# Patient Record
Sex: MALE | Race: WHITE | NOT HISPANIC OR LATINO | Employment: STUDENT | URBAN - METROPOLITAN AREA
[De-identification: names, ages, dates, MRNs, and addresses within clinical notes are randomized per-mention and may not be internally consistent; named-entity substitution may affect disease eponyms.]

---
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
## 2024-01-04 ENCOUNTER — DOCUMENTATION (OUTPATIENT)
Dept: TRANSPLANT | Facility: HOSPITAL | Age: 19
End: 2024-01-04
Payer: COMMERCIAL

## 2024-01-04 NOTE — PROGRESS NOTES
Made a phone call to patient with Dr. Dutton to review the listing consent form over the phone.  The patient stated that he was good to review the form with us without his support.      LISTING EDUCATION    Patient educated regarding the following prior to placement on the transplant waiting list:   The patient?s medical condition, prognosis, and treatment plan.   The expectations and patient responsibilities while on the waiting list, including:   Keeping the transplant center informed of any changes in contact information or insurance coverage   Notifying the transplant center of any changes in medical status   Required testing and/or re-evaluation appointments while awaiting transplant   An overview of the surgical procedure, including potential risks and alternatives.   Information regarding what to expect during the inpatient admission and recovery period.   A discussion regarding organ offers and types of potential donors, including potential risks that may be associated with specific types of donors that could affect the success of the transplant or the health of the patient.  The right to refuse transplantation.     Patient was given the opportunity to have questions answered. Patient was provided a copy of the informed consent for transplant listing.    Education provided by:  Transplant Coordinator: Angeli Gomez RN   Transplant Physician: Dr. Vish Dutton     Signed listing informed consent received? YES/NO  Patient agrees to be listed for the following:  KDPI > 85% []  Donors After Circulatory Death (DCD) [X]  Donors with a Positive Core Antibody for Hepatitis B [X]  Donors with Hepatitis C Virus to recipients with hepatitis C []  Donors with Hepatitis C Virus to Negative hepatitis C recipients []    Patient will be discussed at an upcoming selection committee to determine eligibility to be placed on the [facility] waiting list.

## 2024-01-05 ENCOUNTER — DOCUMENTATION (OUTPATIENT)
Dept: TRANSPLANT | Facility: HOSPITAL | Age: 19
End: 2024-01-05
Payer: COMMERCIAL

## 2024-01-05 NOTE — PROGRESS NOTES
Rec'd Daily fax for kidney txp listing & emailed it to finance auth# [id number] valid 11/17/23-11/16/24. Precert

## 2024-01-05 NOTE — PROGRESS NOTES
Patient added to [facility] waitlist status 1.  Add sent to HLA.  Tasked listing letter to [facility]UAN Lechuga

## 2024-01-05 NOTE — Clinical Note
Rec'd Daily fax for kidney txp listing & emailed it to finance auth# [id number] valid 11/17/23-11/16/24. Precert

## 2024-01-19 ENCOUNTER — TELEPHONE (OUTPATIENT)
Dept: PRIMARY CARE | Facility: CLINIC | Age: 19
End: 2024-01-19
Payer: COMMERCIAL

## 2024-01-19 PROBLEM — G40.89 OTHER SEIZURES (MULTI): Status: ACTIVE | Noted: 2023-12-18

## 2024-01-19 PROBLEM — R07.9 CHEST PAIN, UNSPECIFIED: Status: ACTIVE | Noted: 2023-12-18

## 2024-01-19 PROBLEM — R06.02 SHORTNESS OF BREATH: Status: ACTIVE | Noted: 2023-12-18

## 2024-01-19 PROBLEM — E87.6 HYPOKALEMIA: Status: ACTIVE | Noted: 2023-12-13

## 2024-01-19 NOTE — TELEPHONE ENCOUNTER
Unfortunately cannot take paxlovid as its not indicated for ESRD.   Supportive care, monitor O2 if <92 seek addtl care     Send separate tasks on parents pls

## 2024-01-19 NOTE — TELEPHONE ENCOUNTER
Asymp Covid+ 1/17, tested for close contact.  Mild fever 2 days, last night coughing + threw up (not a new Sx).    No other Sx.   Advise?    Parents Ginger & Aryan also covid pos, but both coughing, sinus drainage.  Asked if they need treatment?

## 2024-01-29 ENCOUNTER — LAB REQUISITION (OUTPATIENT)
Dept: LAB | Facility: CLINIC | Age: 19
End: 2024-01-29
Payer: COMMERCIAL

## 2024-01-29 DIAGNOSIS — N18.6 END STAGE RENAL DISEASE (MULTI): ICD-10-CM

## 2024-01-31 ENCOUNTER — LAB REQUISITION (OUTPATIENT)
Dept: LAB | Facility: CLINIC | Age: 19
End: 2024-01-31
Payer: COMMERCIAL

## 2024-01-31 DIAGNOSIS — N18.6 END STAGE RENAL DISEASE (MULTI): ICD-10-CM

## 2024-01-31 PROCEDURE — 86832 HLA CLASS I HIGH DEFIN QUAL: CPT | Mod: OUT | Performed by: SURGERY

## 2024-02-01 LAB
HLA RESULTS: NORMAL
HLA-A+B+C AB NFR SER: NORMAL %
HLA-DP+DQ+DR AB NFR SER: NORMAL %

## 2024-02-28 ENCOUNTER — LAB REQUISITION (OUTPATIENT)
Dept: LAB | Facility: CLINIC | Age: 19
End: 2024-02-28
Payer: COMMERCIAL

## 2024-02-28 DIAGNOSIS — N18.6 END STAGE RENAL DISEASE (MULTI): ICD-10-CM

## 2024-02-29 DIAGNOSIS — Z01.818 ENCOUNTER FOR PRE-TRANSPLANT EVALUATION FOR LIVER TRANSPLANT: Primary | ICD-10-CM

## 2024-03-04 ENCOUNTER — LAB (OUTPATIENT)
Dept: LAB | Facility: LAB | Age: 19
End: 2024-03-04
Payer: COMMERCIAL

## 2024-03-04 DIAGNOSIS — Z01.818 ENCOUNTER FOR PRE-TRANSPLANT EVALUATION FOR LIVER TRANSPLANT: ICD-10-CM

## 2024-03-04 PROCEDURE — 36415 COLL VENOUS BLD VENIPUNCTURE: CPT

## 2024-03-04 PROCEDURE — 86825 HLA X-MATH NON-CYTOTOXIC: CPT | Mod: OUT | Performed by: SURGERY

## 2024-03-04 PROCEDURE — 86832 HLA CLASS I HIGH DEFIN QUAL: CPT | Mod: OUT | Performed by: SURGERY

## 2024-03-05 ENCOUNTER — LAB REQUISITION (OUTPATIENT)
Dept: LAB | Facility: CLINIC | Age: 19
End: 2024-03-05
Payer: COMMERCIAL

## 2024-03-05 DIAGNOSIS — N18.6 END STAGE RENAL DISEASE (MULTI): ICD-10-CM

## 2024-03-05 LAB
FLOW AUTOCROSSMATCH: NORMAL
HLA CLS I TYP PNL BLD/T DONR HIGH RES: NORMAL
HLA RESULTS: NORMAL
HLA-A+B+C AB NFR SER: NORMAL %
HLA-A+B+C AB NFR SER: NORMAL %
HLA-DP+DQ+DR AB NFR SER: NORMAL %
HLA-DP+DQ+DR AB NFR SER: NORMAL %
HLA-DP2 QL: NORMAL
HLA-DQB1 HIGH RES: NORMAL
HLA-DRB1 HIGH RES: NORMAL

## 2024-03-06 LAB
FLOW ALLOCROSSMATCH: NORMAL
HLA RESULTS: NORMAL

## 2024-03-13 ENCOUNTER — TELEPHONE (OUTPATIENT)
Dept: GASTROENTEROLOGY | Facility: CLINIC | Age: 19
End: 2024-03-13
Payer: COMMERCIAL

## 2024-03-13 NOTE — TELEPHONE ENCOUNTER
----- Message from Rachel Newton MA sent at 3/13/2024  9:18 AM EDT -----  Regarding: Transplant  They have found a kidney match.  Says nephrology doesn't have experience with Crohns.  Wants your point of view on timing of doing the transplant, as far as infusions go.  Wants to do the very best thing for patient so as not to reject kidney.    [phone]      I had a discussion with the patient and congratulated him on the match.  Also discussed with his mother at his request that my viewpoint would be the optimal time to perform the transplant is when his Crohn's is in remission which she currently is.  I would recommend he do an Entyvio infusion as he is doing and plan the transplant between the 4 and 8-week jaron.  After transplant I would hold Entyvio to better define what the transplant physicians are going to use for immunosuppression to prevent rejection.  He may also receive steroids that would also be benefiting his Crohn's.  If they choose Imuran this may be a medication to help both Crohn's and his rejection.  I will defer to them the choice of the medications to prevent rejection.  Finally, I would see him in the office approximately 4 to 6 weeks after surgery to better define the medicines they are using after transplant as well as the timing to consider restarting Entyvio.    All her questions are answered.

## 2024-03-22 ENCOUNTER — TELEPHONE (OUTPATIENT)
Dept: TRANSPLANT | Facility: HOSPITAL | Age: 19
End: 2024-03-22
Payer: COMMERCIAL

## 2024-03-22 NOTE — TELEPHONE ENCOUNTER
Spoke with Ginger this morning regarding getting a surgery date on the books. Looking to schedule end of May beginning June. Kelvin will be between [facility] and out of school. Note sent to neph and surg to confirm plan.

## 2024-04-02 ENCOUNTER — TELEPHONE (OUTPATIENT)
Dept: TRANSPLANT | Facility: HOSPITAL | Age: 19
End: 2024-04-02
Payer: COMMERCIAL

## 2024-04-02 DIAGNOSIS — N18.6 ESRD (END STAGE RENAL DISEASE) (MULTI): Primary | ICD-10-CM

## 2024-04-02 NOTE — TELEPHONE ENCOUNTER
Spoke with Ginger today as they would like to move forward with surgery at end of April. They are requesting transplant date prior to Entyvio treatment. Dr. Dill in agreement with this plan and surgery date of 4/25/2024 offered and confirmed. Explained that we will bring Kelvin in for a pre-op visit approximately 1-2 weeks prior to surgery. This will entail preadmission testing, labs and a visit with the surgeon. Encouraged Ginger to call with any questions.

## 2024-04-04 ENCOUNTER — DOCUMENTATION (OUTPATIENT)
Dept: TRANSPLANT | Facility: HOSPITAL | Age: 19
End: 2024-04-04
Payer: COMMERCIAL

## 2024-04-05 DIAGNOSIS — Z01.818 PRE-TRANSPLANT EVALUATION FOR KIDNEY TRANSPLANT: Primary | ICD-10-CM

## 2024-04-05 LAB
FLOW AUTOCROSSMATCH: NORMAL
HLA RESULTS: NORMAL

## 2024-04-09 ENCOUNTER — PREP FOR PROCEDURE (OUTPATIENT)
Dept: TRANSPLANT | Facility: HOSPITAL | Age: 19
End: 2024-04-09
Payer: COMMERCIAL

## 2024-04-09 DIAGNOSIS — N18.6 ESRD (END STAGE RENAL DISEASE) (MULTI): Primary | ICD-10-CM

## 2024-04-11 ENCOUNTER — TELEPHONE (OUTPATIENT)
Dept: TRANSPLANT | Facility: HOSPITAL | Age: 19
End: 2024-04-11
Payer: COMMERCIAL

## 2024-04-11 NOTE — TELEPHONE ENCOUNTER
Called and spoke to patient to remind them to update monthly HLA sample at either an approved  lab or dialysis unit, if applicable.  Patient verbalized an understanding and will complete lab work.  Coordinator notified.

## 2024-04-16 ENCOUNTER — DOCUMENTATION (OUTPATIENT)
Dept: TRANSPLANT | Facility: HOSPITAL | Age: 19
End: 2024-04-16

## 2024-04-16 ENCOUNTER — HOSPITAL ENCOUNTER (OUTPATIENT)
Dept: RADIOLOGY | Facility: HOSPITAL | Age: 19
Discharge: HOME | End: 2024-04-16
Payer: COMMERCIAL

## 2024-04-16 ENCOUNTER — PRE-ADMISSION TESTING (OUTPATIENT)
Dept: PREADMISSION TESTING | Facility: HOSPITAL | Age: 19
End: 2024-04-16
Payer: COMMERCIAL

## 2024-04-16 ENCOUNTER — HOSPITAL ENCOUNTER (OUTPATIENT)
Dept: CARDIOLOGY | Facility: HOSPITAL | Age: 19
Discharge: HOME | End: 2024-04-16
Payer: COMMERCIAL

## 2024-04-16 ENCOUNTER — APPOINTMENT (OUTPATIENT)
Dept: LAB | Facility: LAB | Age: 19
End: 2024-04-16
Payer: COMMERCIAL

## 2024-04-16 ENCOUNTER — OFFICE VISIT (OUTPATIENT)
Dept: TRANSPLANT | Facility: HOSPITAL | Age: 19
End: 2024-04-16
Payer: COMMERCIAL

## 2024-04-16 VITALS
WEIGHT: 96.34 LBS | TEMPERATURE: 97.9 F | HEIGHT: 66 IN | SYSTOLIC BLOOD PRESSURE: 117 MMHG | OXYGEN SATURATION: 98 % | DIASTOLIC BLOOD PRESSURE: 81 MMHG | HEART RATE: 110 BPM | BODY MASS INDEX: 15.48 KG/M2

## 2024-04-16 VITALS
SYSTOLIC BLOOD PRESSURE: 143 MMHG | HEART RATE: 116 BPM | OXYGEN SATURATION: 96 % | DIASTOLIC BLOOD PRESSURE: 112 MMHG | WEIGHT: 96.7 LBS | TEMPERATURE: 95.5 F | BODY MASS INDEX: 16.09 KG/M2

## 2024-04-16 DIAGNOSIS — Z01.818 PRE-TRANSPLANT EVALUATION FOR KIDNEY TRANSPLANT: Primary | ICD-10-CM

## 2024-04-16 DIAGNOSIS — Z01.818 PRE-TRANSPLANT EVALUATION FOR KIDNEY TRANSPLANT: ICD-10-CM

## 2024-04-16 DIAGNOSIS — N18.6 ESRD (END STAGE RENAL DISEASE) (MULTI): ICD-10-CM

## 2024-04-16 LAB
ABO GROUP (TYPE) IN BLOOD: NORMAL
ALBUMIN SERPL BCP-MCNC: 5.1 G/DL (ref 3.4–5)
ALP SERPL-CCNC: 224 U/L (ref 33–120)
ALT SERPL W P-5'-P-CCNC: 17 U/L (ref 10–52)
ANION GAP SERPL CALC-SCNC: 20 MMOL/L (ref 10–20)
ANTIBODY SCREEN: NORMAL
APPEARANCE UR: CLEAR
APTT PPP: 38 SECONDS (ref 27–38)
AST SERPL W P-5'-P-CCNC: 17 U/L (ref 9–39)
BILIRUB SERPL-MCNC: 0.3 MG/DL (ref 0–1.2)
BILIRUB UR STRIP.AUTO-MCNC: NEGATIVE MG/DL
BUN SERPL-MCNC: 39 MG/DL (ref 6–23)
CALCIUM SERPL-MCNC: 9.3 MG/DL (ref 8.6–10.6)
CHLORIDE SERPL-SCNC: 99 MMOL/L (ref 98–107)
CMV IGG AVIDITY SERPL IA-RTO: NONREACTIVE %
CO2 SERPL-SCNC: 25 MMOL/L (ref 21–32)
COLOR UR: COLORLESS
CREAT SERPL-MCNC: 6.68 MG/DL (ref 0.5–1.3)
EBV EA IGG SER QL: NEGATIVE
EBV NA AB SER QL: NEGATIVE
EBV VCA IGG SER IA-ACNC: NEGATIVE
EBV VCA IGM SER IA-ACNC: NEGATIVE
EGFRCR SERPLBLD CKD-EPI 2021: 11 ML/MIN/1.73M*2
ERYTHROCYTE [DISTWIDTH] IN BLOOD BY AUTOMATED COUNT: 13.3 % (ref 11.5–14.5)
EST. AVERAGE GLUCOSE BLD GHB EST-MCNC: 74 MG/DL
GLUCOSE SERPL-MCNC: 68 MG/DL (ref 74–99)
GLUCOSE UR STRIP.AUTO-MCNC: ABNORMAL MG/DL
HBA1C MFR BLD: 4.2 %
HBV CORE AB SER QL: NONREACTIVE
HBV SURFACE AB SER-ACNC: <3.1 MIU/ML
HBV SURFACE AG SERPL QL IA: NONREACTIVE
HCT VFR BLD AUTO: 41.5 % (ref 41–52)
HCV AB SER QL: NONREACTIVE
HGB BLD-MCNC: 13.2 G/DL (ref 13.5–17.5)
HIV 1+2 AB+HIV1 P24 AG SERPL QL IA: NONREACTIVE
INR PPP: 1.1 (ref 0.9–1.1)
KETONES UR STRIP.AUTO-MCNC: NEGATIVE MG/DL
LEUKOCYTE ESTERASE UR QL STRIP.AUTO: NEGATIVE
MCH RBC QN AUTO: 29.3 PG (ref 26–34)
MCHC RBC AUTO-ENTMCNC: 31.8 G/DL (ref 32–36)
MCV RBC AUTO: 92 FL (ref 80–100)
NITRITE UR QL STRIP.AUTO: NEGATIVE
NRBC BLD-RTO: 0 /100 WBCS (ref 0–0)
PH UR STRIP.AUTO: 6.5 [PH]
PLATELET # BLD AUTO: 311 X10*3/UL (ref 150–450)
POTASSIUM SERPL-SCNC: 3.5 MMOL/L (ref 3.5–5.3)
PROT SERPL-MCNC: 7.8 G/DL (ref 6.4–8.2)
PROT UR STRIP.AUTO-MCNC: ABNORMAL MG/DL
PROTHROMBIN TIME: 12.2 SECONDS (ref 9.8–12.8)
RBC # BLD AUTO: 4.5 X10*6/UL (ref 4.5–5.9)
RBC # UR STRIP.AUTO: ABNORMAL /UL
RBC #/AREA URNS AUTO: NORMAL /HPF
RH FACTOR (ANTIGEN D): NORMAL
SODIUM SERPL-SCNC: 140 MMOL/L (ref 136–145)
SP GR UR STRIP.AUTO: 1.01
SQUAMOUS #/AREA URNS AUTO: NORMAL /HPF
UROBILINOGEN UR STRIP.AUTO-MCNC: NORMAL MG/DL
WBC # BLD AUTO: 9.1 X10*3/UL (ref 4.4–11.3)
WBC #/AREA URNS AUTO: NORMAL /HPF

## 2024-04-16 PROCEDURE — 99215 OFFICE O/P EST HI 40 MIN: CPT | Performed by: TRANSPLANT SURGERY

## 2024-04-16 PROCEDURE — 86825 HLA X-MATH NON-CYTOTOXIC: CPT | Mod: OUT | Performed by: SURGERY

## 2024-04-16 PROCEDURE — 86832 HLA CLASS I HIGH DEFIN QUAL: CPT

## 2024-04-16 PROCEDURE — 36415 COLL VENOUS BLD VENIPUNCTURE: CPT

## 2024-04-16 PROCEDURE — 86901 BLOOD TYPING SEROLOGIC RH(D): CPT

## 2024-04-16 PROCEDURE — 85610 PROTHROMBIN TIME: CPT

## 2024-04-16 PROCEDURE — 71046 X-RAY EXAM CHEST 2 VIEWS: CPT

## 2024-04-16 PROCEDURE — 81001 URINALYSIS AUTO W/SCOPE: CPT

## 2024-04-16 PROCEDURE — 86704 HEP B CORE ANTIBODY TOTAL: CPT

## 2024-04-16 PROCEDURE — 93010 ELECTROCARDIOGRAM REPORT: CPT | Performed by: INTERNAL MEDICINE

## 2024-04-16 PROCEDURE — 87389 HIV-1 AG W/HIV-1&-2 AB AG IA: CPT

## 2024-04-16 PROCEDURE — 86645 CMV ANTIBODY IGM: CPT

## 2024-04-16 PROCEDURE — 86644 CMV ANTIBODY: CPT

## 2024-04-16 PROCEDURE — 3008F BODY MASS INDEX DOCD: CPT | Performed by: TRANSPLANT SURGERY

## 2024-04-16 PROCEDURE — 71046 X-RAY EXAM CHEST 2 VIEWS: CPT | Performed by: RADIOLOGY

## 2024-04-16 PROCEDURE — 86706 HEP B SURFACE ANTIBODY: CPT

## 2024-04-16 PROCEDURE — 86803 HEPATITIS C AB TEST: CPT

## 2024-04-16 PROCEDURE — 85027 COMPLETE CBC AUTOMATED: CPT

## 2024-04-16 PROCEDURE — 83036 HEMOGLOBIN GLYCOSYLATED A1C: CPT

## 2024-04-16 PROCEDURE — 1036F TOBACCO NON-USER: CPT | Performed by: TRANSPLANT SURGERY

## 2024-04-16 PROCEDURE — 86663 EPSTEIN-BARR ANTIBODY: CPT

## 2024-04-16 PROCEDURE — 80053 COMPREHEN METABOLIC PANEL: CPT

## 2024-04-16 PROCEDURE — 87340 HEPATITIS B SURFACE AG IA: CPT

## 2024-04-16 PROCEDURE — 99204 OFFICE O/P NEW MOD 45 MIN: CPT | Performed by: NURSE PRACTITIONER

## 2024-04-16 PROCEDURE — 93005 ELECTROCARDIOGRAM TRACING: CPT

## 2024-04-16 RX ORDER — CALCITRIOL 0.25 UG/1
0.25 CAPSULE ORAL 3 TIMES WEEKLY
COMMUNITY
Start: 2023-11-30 | End: 2024-04-29 | Stop reason: HOSPADM

## 2024-04-16 RX ORDER — PSYLLIUM HUSK (WITH SUGAR) 3.4 G/12 G
POWDER (GRAM) ORAL
COMMUNITY
Start: 2023-12-14 | End: 2024-04-29 | Stop reason: HOSPADM

## 2024-04-16 ASSESSMENT — ENCOUNTER SYMPTOMS
WEAKNESS: 0
MUSCULOSKELETAL NEGATIVE: 1
NECK NEGATIVE: 1
CARDIOVASCULAR NEGATIVE: 1
ARTHRALGIAS: 0
EYES NEGATIVE: 1
AGITATION: 0
DIARRHEA: 0
EYES NEGATIVE: 1
RHINORRHEA: 1
HALLUCINATIONS: 0
DYSURIA: 0
CONSTIPATION: 0
FREQUENCY: 0
ABDOMINAL PAIN: 0
ABDOMINAL DISTENTION: 0
LIGHT-HEADEDNESS: 0
FEVER: 0
NEUROLOGICAL NEGATIVE: 1
ADENOPATHY: 0
COLOR CHANGE: 0
ENDOCRINE NEGATIVE: 1
RESPIRATORY NEGATIVE: 1
CHILLS: 0
SHORTNESS OF BREATH: 0
GASTROINTESTINAL NEGATIVE: 1
CONSTITUTIONAL NEGATIVE: 1
DIZZINESS: 0
HEMATURIA: 0
COUGH: 0
CONFUSION: 0

## 2024-04-16 ASSESSMENT — DUKE ACTIVITY SCORE INDEX (DASI)
CAN YOU CLIMB A FLIGHT OF STAIRS OR WALK UP A HILL: YES
CAN YOU RUN A SHORT DISTANCE: YES
CAN YOU TAKE CARE OF YOURSELF (EAT, DRESS, BATHE, OR USE TOILET): YES
CAN YOU DO HEAVY WORK AROUND THE HOUSE LIKE SCRUBBING FLOORS OR LIFTING AND MOVING HEAVY FURNITURE: NO
TOTAL_SCORE: 44.95
CAN YOU PARTICIPATE IN STRENOUS SPORTS LIKE SWIMMING, SINGLES TENNIS, FOOTBALL, BASKETBALL, OR SKIING: YES
CAN YOU WALK INDOORS, SUCH AS AROUND YOUR HOUSE: YES
DASI METS SCORE: 8.3
CAN YOU DO LIGHT WORK AROUND THE HOUSE LIKE DUSTING OR WASHING DISHES: YES
CAN YOU DO YARD WORK LIKE RAKING LEAVES, WEEDING OR PUSHING A MOWER: YES
CAN YOU DO MODERATE WORK AROUND THE HOUSE LIKE VACUUMING, SWEEPING FLOORS OR CARRYING GROCERIES: YES
CAN YOU HAVE SEXUAL RELATIONS: NO
CAN YOU WALK A BLOCK OR TWO ON LEVEL GROUND: YES
CAN YOU PARTICIPATE IN MODERATE RECREATIONAL ACTIVITIES LIKE GOLF, BOWLING, DANCING, DOUBLES TENNIS OR THROWING A BASEBALL OR FOOTBALL: YES

## 2024-04-16 ASSESSMENT — CHADS2 SCORE
AGE GREATER THAN OR EQUAL TO 75: NO
HYPERTENSION: NO
PRIOR STROKE OR TIA OR THROMBOEMBOLISM: NO
CHF: NO
DIABETES: NO
CHADS2 SCORE: 0

## 2024-04-16 ASSESSMENT — LIFESTYLE VARIABLES: SMOKING_STATUS: NONSMOKER

## 2024-04-16 NOTE — CPM/PAT H&P
"CPM/PAT Evaluation       Name: Trever Ellison (Trever Ellison)  /Age: 2005/19 y.o.     Visit Type:   In-Person       Chief Complaint: ESRD    HPI  The patient is a 19 year old  male with ESRD. This is thought to be secondary to Vedolizumab use for his Crohn's disease. He currently does PD. He presents today for perioperative evaluation in anticipation of transplant kidney on  with Dr. Williamson.    Past Medical History:   Diagnosis Date    Acute Crohn's disease (Multi)     colitis    Aluminum bone disease     Anemia     Angina pectoris (CMS-HCC)     Cachexia (Multi)     End stage chronic kidney disease (Multi)     Dialysis    Hemodialysis patient (CMS-HCC)     Hydronephrosis     1 yo    Nephritis     Tachycardia     Vitamin D deficiency        Past Surgical History:   Procedure Laterality Date    COLON SURGERY      crohns    COLONOSCOPY      CT GUIDED CHEST TUBE PLACEMENT  2023    CT GUIDED CHEST TUBE PLACEMENT 2023 [facility]    CT GUIDED PERCUTANEOUS BIOPSY KIDNEY      CT GUIDED PERCUTANEOUS BIOPSY LUNG  2023    CT GUIDED PERCUTANEOUS BIOPSY LUNG 2023 [facility]    IR TUNNELED DIALYSIS CATHETER      LUNG BIOPSY      UPPER GASTROINTESTINAL ENDOSCOPY         Patient  reports that he is not currently sexually active.    Family History   Problem Relation Name Age of Onset    Graves' disease Mother      Hypertension Father      Kidney disease Brother      Heart disease Maternal Grandmother      Hypertension Maternal Grandfather      COPD Paternal Grandmother      Heart disease Paternal Grandmother      Accidental death Paternal Grandfather      Other (Other) Father's Sister          AMYOTROPHIC LATERAL SCLEROSIS    Hyperlipidemia Other MAT RELATIVES     Hyperlipidemia Other PAT RELATIVES        Allergies   Allergen Reactions    Cephalexin Rash    Methotrexate Headache, Other and Unknown     \"Did not tolerate well\", per mom. \" He would get sick, it caused nausea and headaches.\" "    Adhesive Tape-Silicones Rash     Redness, raw skin       Prior to Admission medications    Medication Sig Start Date End Date Taking? Authorizing Provider   acetaminophen (Tylenol) 325 mg capsule Take by mouth. 5/23/23   Historical Provider, MD   amitriptyline (Elavil) 10 mg tablet TAKE 1 TABLET (10 MG) BY MOUTH ONCE DAILY AT BEDTIME. 11/3/23 11/2/24  Brittany Behm, DO   calcitriol (Rocaltrol) 0.25 mcg capsule Take by mouth. 11/30/23   Historical Provider, MD   calcium carbonate (Tums) 200 mg calcium chewable tablet Chew 1 tablet (500 mg) twice a day.    Historical Provider, MD   cholecalciferol (Vitamin D-3) 50 mcg (2,000 unit) capsule Take 1 capsule (50 mcg) by mouth once daily. 7/27/23   Historical Provider, MD   ergocalciferol (Vitamin D-2) 1.25 MG (36247 UT) capsule Take by mouth per week. 5/23/23   Historical Provider, MD   gentamicin (Garamycin) 0.1 % cream APPLY SMALL AMOUNT TO EXIT SITE DAILY 5/25/23   Historical Provider, MD   methoxy peg-epoetin beta (MIRCERA INJ) Inject 30 mcg under the skin. 4/12/24   Historical Provider, MD   NON FORMULARY AIRBORNE CHEW   CHEW AND SWALLOW 1 TAB DAILY PRN    Historical Provider, MD   psyllium husk, with sugar, (Fiber, psyllium husk-sugar,) 3.4 gram/12 gram powder Take by mouth once daily. 12/14/23   Historical Provider, MD   sevelamer (Renagel) 800 mg tablet Take 1 tablet (800 mg) by mouth 3 times a day with meals.    Historical Provider, MD   vedolizumab (Entyvio) 300 mg injection Infuse 1 Dose into a venous catheter.    Historical Provider, MD   iron aspgly,ps/C/succinic acid (IRON ASPGL,PS COMPLEX-VIT C-SA ORAL) Take 1 tablet by mouth. TAKE PER DIRECTED  4/16/24  Historical Provider, MD   iron polysaccharides (Nu-Iron,Niferex) 150 mg iron capsule Take 1 capsule (150 mg) by mouth once daily. 6/21/23 4/16/24  Historical Provider, MD   L. acidophilus/Bifid. animalis 32 billion cell capsule Take by mouth. 10/22/15 4/16/24  Historical Provider, MD   lactulose 10  gram/15 mL solution Take 15 mL (10 g) by mouth. TAKE 30ML NOW THEN REPEAT EVERY 3 HRS UNTIL WORKING. THEN TAKE 30 ML 1-2 TIMES DAILY 7/11/23 4/16/24  Historical Provider, MD   nystatin (Mycostatin) 100,000 unit/mL suspension SWISH AND SWALLOW 5ML 4 TIMES DAILY FOR 5 DAYS PER DIRECTED 7/5/23 4/16/24  Historical Provider, MD   ondansetron 4 mg film Take by mouth. TAKE PER DIRECTED 7/5/23 4/16/24  Historical Provider, MD   oxyCODONE (Roxicodone) 5 mg immediate release tablet Take 1 tablet (5 mg) by mouth every 6 hours if needed. 7/15/23 4/16/24  Historical Provider, MD   scopolamine (Transderm-Scop) 1 mg over 3 days patch 3 day Place 1 patch over 72 hours on the skin every 3rd day if needed (nausea) for up to 5 doses.  Patient not taking: Reported on 4/10/2024 7/3/23 4/16/24  Brittany Behm, DO   VSL#3 112.5 billion cell capsule Take by mouth once daily. 5/23/23 4/16/24  Historical Provider, MD WHITE ROS:   Constitutional:   neg    Neuro/Psych:   neg    Eyes:   neg     use of corrective lenses (contacts)  Ears:   Nose:   neg     nasal discharge  Mouth:   neg    Throat:   neg    Neck:   neg    Cardio:   neg    Respiratory:   neg    Endocrine:   neg    GI:   neg    :   neg    Musculoskeletal:   neg    Hematologic:   neg    Skin:  neg        Physical Exam  Vitals reviewed.   Constitutional:       Appearance: Normal appearance.   HENT:      Head: Normocephalic and atraumatic.      Nose: Nose normal.      Mouth/Throat:      Mouth: Mucous membranes are moist.      Pharynx: Oropharynx is clear.   Eyes:      Extraocular Movements: Extraocular movements intact.      Pupils: Pupils are equal, round, and reactive to light.   Cardiovascular:      Rate and Rhythm: Normal rate and regular rhythm.      Pulses: Normal pulses.      Heart sounds: Normal heart sounds.   Pulmonary:      Effort: Pulmonary effort is normal.      Breath sounds: Normal breath sounds.   Musculoskeletal:         General: Normal range of motion.       Cervical back: Normal range of motion.   Skin:     General: Skin is warm and dry.   Neurological:      General: No focal deficit present.      Mental Status: He is alert and oriented to person, place, and time.   Psychiatric:         Mood and Affect: Mood normal.         Behavior: Behavior normal.          PAT AIRWAY:   Airway:     Mallampati::  IV    TM distance::  >3 FB    Neck ROM::  Full  normal        Visit Vitals  /81   Pulse 110   Temp 36.6 °C (97.9 °F) (Temporal)       DASI Risk Score      Flowsheet Row Most Recent Value   DASI SCORE 44.95   METS Score (Will be calculated only when all the questions are answered) 8.3          Caprini DVT Assessment      Flowsheet Row Most Recent Value   DVT Score 7   Current Status Major surgery planned, lasting over 3 hours   History Prior major surgery   Age Less than 40 years   BMI 30 or less          Modified Frailty Index      Flowsheet Row Most Recent Value   Modified Frailty Index Calculator 0          CHADS2 Stroke Risk         N/A 3 to 100%: High Risk   2 to < 3%: Medium Risk   0 to < 2%: Low Risk     Last Change: N/A          This score determines the patient's risk of having a stroke if the patient has atrial fibrillation.        This score is not applicable to this patient. Components are not calculated.          Revised Cardiac Risk Index      Flowsheet Row Most Recent Value   Revised Cardiac Risk Calculator 1          Apfel Simplified Score      Flowsheet Row Most Recent Value   Apfel Simplified Score Calculator 2          Risk Analysis Index Results This Encounter    No data found in the last 1 encounters.       Stop Bang Score      Flowsheet Row Most Recent Value   Do you snore loudly? 0   Do you often feel tired or fatigued after your sleep? 1   Has anyone ever observed you stop breathing in your sleep? 0   Do you have or are you being treated for high blood pressure? 0   Recent BMI (Calculated) 15.4   Is BMI greater than 35 kg/m2? 0=No   Age older  than 50 years old? 0=No   Is your neck circumference greater than 17 inches (Male) or 16 inches (Female)? 0   Gender - Male 1=Yes   STOP-BANG Total Score 2          Recent Results (from the past 168 hour(s))   ECG 12 Lead    Collection Time: 04/16/24  1:12 PM   Result Value Ref Range    Ventricular Rate 108 BPM    Atrial Rate 108 BPM    MA Interval 118 ms    QRS Duration 94 ms    QT Interval 330 ms    QTC Calculation(Bazett) 442 ms    P Axis 78 degrees    R Axis 102 degrees    T Axis 55 degrees    QRS Count 18 beats    Q Onset 221 ms    P Onset 162 ms    P Offset 210 ms    T Offset 386 ms    QTC Fredericia 401 ms   Hepatitis B Core Antibody, Total    Collection Time: 04/16/24  2:37 PM   Result Value Ref Range    Hepatitis B Core AB- Total Nonreactive Nonreactive   Hepatitis B Surface Antigen    Collection Time: 04/16/24  2:37 PM   Result Value Ref Range    Hepatitis B Surface AG Nonreactive Nonreactive   Hepatitis C Antibody    Collection Time: 04/16/24  2:37 PM   Result Value Ref Range    Hepatitis C AB Nonreactive Nonreactive   Hepatitis B Surface Antibody    Collection Time: 04/16/24  2:37 PM   Result Value Ref Range    Hepatitis B Surface AB <3.1 <10.0 mIU/mL   HIV 1/2 Antigen/Antibody Screen with Reflex to Confirmation    Collection Time: 04/16/24  2:37 PM   Result Value Ref Range    HIV 1/2 Antigen/Antibody Screen with Reflex to Confirmation Nonreactive Nonreactive   CBC    Collection Time: 04/16/24  2:37 PM   Result Value Ref Range    WBC 9.1 4.4 - 11.3 x10*3/uL    nRBC 0.0 0.0 - 0.0 /100 WBCs    RBC 4.50 4.50 - 5.90 x10*6/uL    Hemoglobin 13.2 (L) 13.5 - 17.5 g/dL    Hematocrit 41.5 41.0 - 52.0 %    MCV 92 80 - 100 fL    MCH 29.3 26.0 - 34.0 pg    MCHC 31.8 (L) 32.0 - 36.0 g/dL    RDW 13.3 11.5 - 14.5 %    Platelets 311 150 - 450 x10*3/uL   Coagulation Screen    Collection Time: 04/16/24  2:37 PM   Result Value Ref Range    Protime 12.2 9.8 - 12.8 seconds    INR 1.1 0.9 - 1.1    aPTT 38 27 - 38 seconds    Comprehensive Metabolic Panel    Collection Time: 04/16/24  2:37 PM   Result Value Ref Range    Glucose 68 (L) 74 - 99 mg/dL    Sodium 140 136 - 145 mmol/L    Potassium 3.5 3.5 - 5.3 mmol/L    Chloride 99 98 - 107 mmol/L    Bicarbonate 25 21 - 32 mmol/L    Anion Gap 20 10 - 20 mmol/L    Urea Nitrogen 39 (H) 6 - 23 mg/dL    Creatinine 6.68 (H) 0.50 - 1.30 mg/dL    eGFR 11 (L) >60 mL/min/1.73m*2    Calcium 9.3 8.6 - 10.6 mg/dL    Albumin 5.1 (H) 3.4 - 5.0 g/dL    Alkaline Phosphatase 224 (H) 33 - 120 U/L    Total Protein 7.8 6.4 - 8.2 g/dL    AST 17 9 - 39 U/L    Bilirubin, Total 0.3 0.0 - 1.2 mg/dL    ALT 17 10 - 52 U/L   Jazmin-Barr Virus Antibody Panel    Collection Time: 04/16/24  2:37 PM   Result Value Ref Range    EBV VCA, IgG  Negative Negative    EBV VCA, IgM  Negative Negative    EBV Early Antigen Antibody, IgG Negative Negative    EBV Nuclear Antigen Antibody, IgG Negative Negative   Hemoglobin A1C    Collection Time: 04/16/24  2:37 PM   Result Value Ref Range    Hemoglobin A1C 4.2 see below %    Estimated Average Glucose 74 Not Established mg/dL   Type And Screen    Collection Time: 04/16/24  2:37 PM   Result Value Ref Range    ABO TYPE B     Rh TYPE POS     ANTIBODY SCREEN NEG    Cytomegalovirus IgG    Collection Time: 04/16/24  2:37 PM   Result Value Ref Range    Cytomegalovirus IgG Nonreactive Nonreactive   Urinalysis with Reflex Culture and Microscopic    Collection Time: 04/16/24  2:37 PM   Result Value Ref Range    Color, Urine Colorless (N) Light-Yellow, Yellow, Dark-Yellow    Appearance, Urine Clear Clear    Specific Gravity, Urine 1.009 1.005 - 1.035    pH, Urine 6.5 5.0, 5.5, 6.0, 6.5, 7.0, 7.5, 8.0    Protein, Urine 50 (1+) (A) NEGATIVE, 10 (TRACE), 20 (TRACE) mg/dL    Glucose, Urine 50 (TRACE) (A) Normal mg/dL    Blood, Urine 0.06 (1+) (A) NEGATIVE    Ketones, Urine NEGATIVE NEGATIVE mg/dL    Bilirubin, Urine NEGATIVE NEGATIVE    Urobilinogen, Urine Normal Normal mg/dL    Nitrite, Urine  NEGATIVE NEGATIVE    Leukocyte Esterase, Urine NEGATIVE NEGATIVE   Extra Urine Gray Tube    Collection Time: 04/16/24  2:37 PM   Result Value Ref Range    Extra Tube Hold for add-ons.    Urinalysis Microscopic    Collection Time: 04/16/24  2:37 PM   Result Value Ref Range    WBC, Urine 1-5 1-5, NONE /HPF    RBC, Urine 1-2 NONE, 1-2, 3-5 /HPF    Squamous Epithelial Cells, Urine 1-9 (SPARSE) Reference range not established. /HPF        Diagnostic Results    EKG  4/16/23  Sinus tachycardia  Possible Left atrial enlargement  Rightward axis  Incomplete right bundle branch block  Borderline ECG  When compared with ECG of 14-JUL-2023 06:06,  Significant changes have occurred    Echo 9/2013    CONCLUSIONS:  1. Left ventricular systolic function is normal with a 55-60% estimated ejection fraction.  2. Spectral Doppler shows a normal pattern of left ventricular diastolic filling.     CT chest 10/2023  IMPRESSION:  A 20 x 11 mm peripheral opacity in the lingula has slightly increased  in size, however, the appearance is linear/bandlike, suggesting  scarring. The remainder of the previously seen areas of consolidation  and nodular opacities have significantly decreased in size and now  have the appearance most consistent with postinfectious or  postinflammatory scarring.      No acute cardiopulmonary process.    PFTs 9/28/23 see media tab  Impression  No significant airways obstruction  No objective bronchodilator response  Early airway closure and gas trapping  Mild defect in gas transfer best corrected adjusted for alveolar ventilation     Assessment and Plan:     Anesthesia:  The patient denies problems with anesthesia in the past such as PONV, prolonged sedation, awareness, dental damage, aspiration, cardiac arrest, difficult intubation, or unexpected hospital admissions.     Neuro:   The patient has no neurological diagnoses or significant findings on chart review, clinical presentation, and evaluation. No grossly apparent  neurological perioperative risk. The patient is at increased risk for perioperative stroke secondary to chronic renal failure, general anesthesia, operative time >2.5 hours. Handouts for preoperative brain exercises given to patient.    HEENT/Airway  No diagnoses, significant findings on chart review, clinical presentation, or evaluation. No documented or reported history of airway difficulty.     Cardiovascular  The patient is scheduled for non-cardiac surgery associated with elevated risk. The patient has no major cardiac contraindications to non- cardiac surgery.  RCRI  The patient meets 0-1 RCRI criteria and therefore has a less than 1% risk of major adverse cardiac complications.  METS  The patient's functional capacity capacity is greater than 4 METS.  EKG  The patient has no EKG or echocardiographic changes concerning for myocardial ischemia.   Heart Failure  The patient has no known history of heart failure.  Additionally, the patient reports no symptoms of heart failure and demonstrates no signs of heart failure.  Hypertension Evaluation  The patient has no known history of hypertension and has a normal blood pressure today.  Heart Rhythm Evaluation  The patient has no history of arrhythmias.  Heart Valve Evaluation  The patient has no known history of valvular heart disease. The patient has no symptoms or physical exam findings to suggest valvular heart disease.  Cardiology Evaluation  The patient is not followed by cardiology.    The patient has a 30-day risk for MACE of 1 predictor, 6.0% risk for cardiac death, nonfatal myocardial infarction, and nonfatal cardiac arrest.  BONITA score which indicates a 0.0% risk of intraoperative or 30-day postoperative.    Pulmonary   No significant findings on chart review or clinical presentation and evaluation. The patient is at increased risk of perioperative pulmonary complications secondary to upper abdominal surgery. History of bilateral lower lobe lung nodules  followed by . He underwent a CT-guided biopsy of one of the nodules that showed alveolated lung parenchyma with chronic inflammation and non necrotizing granulomas.     The patient has a stop bang score of 2, which places patient at low risk for having RUI.    ARISCAT 38, Intermediate, 13.3% risk of in-hospital postoperative pulmonary complications  PRODIGY 3, low risk of respiratory depression episode. Patient given PI sheet for preoperative deep breathing exercises.    Hematology  No diagnoses or significant findings on chart review or clinical presentation and evaluation.  Antiplatelet management   The patient is not currently receiving antiplatelet therapy.  Anticoagulation management  The patient is not currently receiving anticoagulation therapy. Patient provided with DVT educational handout.      Caprini score 7, high risk of perioperative VTE.     Patient instructed to ambulate as soon as possible postoperatively to decrease thromboembolic risk. Initiate mechanical DVT prophylaxis as soon as possible and initiate chemical prophylaxis when deemed safe from a bleeding standpoint post surgery.     Transfusion Evaluation  A type and screen was obtained given the likelihood for perioperative transfusion of blood or blood products.    Gastrointestinal  The patient has diagnoses or significant findings on chart review or clinical presentation and evaluation significant for history of Crohn's disease. Followed by Dr. Hermosillo. Last Vedolizumab infusion was 3/4/2024.    Eat 10- 0,  self-perceived oropharyngeal dysphagia scale (0-40)     Genitourinary  No diagnoses or significant findings on chart review or clinical presentation and evaluation.    Renal  The patient has no known history of chronic kidney disease. The patient has a history of chronic kidney disease most consistent with ESRD on PD dialysis scheduled for kidney transplant on 4/25 with Dr. Williamson. Followed in Nephrology by Dr. Weston.  Patient's  renal function appears unchanged when compared to prior labs.     The patient has specific risk factors associated with increased risk of perioperative renal complications due to male gender, CKD.     Recommendations to avoid nephrotoxic drugs and carefully monitor fluid status to maintain euvolemia. Use dose adjusted medications as needed for the underlying level of renal function.ESRD on PD dialysis scheduled for kidney transplant on 4/25 with Dr. Williamson. Followed in Nephrology by Dr. Weston.     Musculoskeletal  No diagnoses or significant findings on chart review or clinical presentation and evaluation.    Endocrine  Diabetes Evaluation  The patient has no history of diabetes mellitus.  Thyroid Disease Evaluation  The patient has no history of thyroid disease.    ID  No diagnoses or significant findings on chart review or clinical presentation and evaluation.Covid testing scheduled for 4/25.    -Preoperative medication instructions were provided and reviewed with the patient.  Any additional testing or evaluation was explained to the patient.  NPO Instructions were discussed, and the patient's questions were answered prior to conclusion of this encounter.

## 2024-04-16 NOTE — PROGRESS NOTES
Subjective   Trever Ellison is a 19 y.o. male who presents for pre operative visit with kidney transplant surgery scheduled for this month.    Kidney disease was thought to be due to Ramicade from treatment of Crohn's disease.  He is approved by the kidney selection committee listed for kidney transplantation.  He has his father as living donor who is a good candidate for donation.  They are compatible and surgery scheduled for later this month.      Review of Systems   Constitutional:  Negative for chills and fever.   HENT: Negative.  Negative for congestion.    Eyes: Negative.    Respiratory:  Negative for cough and shortness of breath.    Cardiovascular:  Negative for chest pain.   Gastrointestinal:  Negative for abdominal distention, abdominal pain, constipation and diarrhea.   Endocrine: Negative for cold intolerance and heat intolerance.   Genitourinary:  Negative for dysuria, frequency, hematuria and urgency.   Musculoskeletal:  Negative for arthralgias.   Skin:  Negative for color change.   Allergic/Immunologic: Negative for environmental allergies.   Neurological:  Negative for dizziness, weakness and light-headedness.   Hematological:  Negative for adenopathy.   Psychiatric/Behavioral:  Negative for agitation, confusion and hallucinations.         Objective   Vitals:    04/16/24 1145   BP: (!) 143/112   Pulse: (!) 116   Temp: 35.3 °C (95.5 °F)   SpO2: 96%       Physical Exam  Constitutional:       Appearance: Normal appearance.   HENT:      Head: Normocephalic and atraumatic.      Nose: Nose normal.   Eyes:      Pupils: Pupils are equal, round, and reactive to light.   Cardiovascular:      Rate and Rhythm: Normal rate.   Pulmonary:      Effort: Pulmonary effort is normal. No respiratory distress.   Abdominal:      General: There is no distension.      Palpations: Abdomen is soft. There is no mass.      Comments: PD catheter RLQ   Musculoskeletal:         General: No swelling. Normal range of motion.       "Cervical back: Normal range of motion.   Skin:     General: Skin is warm and dry.   Neurological:      General: No focal deficit present.      Mental Status: He is alert and oriented to person, place, and time.   Psychiatric:         Mood and Affect: Mood normal.         Behavior: Behavior normal.          Lab Review    Blood Type: No results found for: \"ABORH\"  Lab Results   Component Value Date    CREATININE CANCELED 07/16/2023    K CANCELED 07/16/2023    GLUCOSE CANCELED 07/16/2023    HCT 38.2 (L) 08/18/2023    WBC 10.1 08/18/2023     08/18/2023    CALCIUM CANCELED 07/16/2023     Lab Results   Component Value Date    WBC 10.1 08/18/2023    HGB 11.5 (L) 08/18/2023    HCT 38.2 (L) 08/18/2023    MCV 89 08/18/2023     08/18/2023     Lab Results   Component Value Date    GLUCOSE CANCELED 07/16/2023    CALCIUM CANCELED 07/16/2023    NA CANCELED 07/16/2023    K CANCELED 07/16/2023    CO2 CANCELED 07/16/2023    CL CANCELED 07/16/2023    BUN CANCELED 07/16/2023    CREATININE CANCELED 07/16/2023       Current Outpatient Medications:     calcitriol (Rocaltrol) 0.25 mcg capsule, Take by mouth., Disp: , Rfl:     methoxy peg-epoetin beta (MIRCERA INJ), Inject 30 mcg under the skin., Disp: , Rfl:     psyllium husk, with sugar, (Fiber, psyllium husk-sugar,) 3.4 gram/12 gram powder, Take by mouth once daily., Disp: , Rfl:     acetaminophen (Tylenol) 325 mg capsule, Take by mouth., Disp: , Rfl:     amitriptyline (Elavil) 10 mg tablet, TAKE 1 TABLET (10 MG) BY MOUTH ONCE DAILY AT BEDTIME., Disp: 90 tablet, Rfl: 4    calcium carbonate (Tums) 200 mg calcium chewable tablet, Chew 1 tablet (500 mg) twice a day., Disp: , Rfl:     cholecalciferol (Vitamin D-3) 50 mcg (2,000 unit) capsule, Take 1 capsule (50 mcg) by mouth once daily., Disp: , Rfl:     ergocalciferol (Vitamin D-2) 1.25 MG (88643 UT) capsule, Take by mouth per week., Disp: , Rfl:     gentamicin (Garamycin) 0.1 % cream, APPLY SMALL AMOUNT TO EXIT SITE DAILY, " Disp: , Rfl:     NON FORMULARY, AIRBORNE CHEW   CHEW AND SWALLOW 1 TAB DAILY PRN, Disp: , Rfl:     sevelamer (Renagel) 800 mg tablet, Take 1 tablet (800 mg) by mouth 3 times a day with meals., Disp: , Rfl:     vedolizumab (Entyvio) 300 mg injection, Infuse 1 Dose into a venous catheter., Disp: , Rfl:         Echo 9/2013    CONCLUSIONS:  1. Left ventricular systolic function is normal with a 55-60% estimated ejection fraction.  2. Spectral Doppler shows a normal pattern of left ventricular diastolic filling.    CT chest 10/2023  IMPRESSION:  A 20 x 11 mm peripheral opacity in the lingula has slightly increased  in size, however, the appearance is linear/bandlike, suggesting  scarring. The remainder of the previously seen areas of consolidation  and nodular opacities have significantly decreased in size and now  have the appearance most consistent with postinfectious or  postinflammatory scarring.      No acute cardiopulmonary process.    Assessment/Plan    Diagnoses:   1. Pre-transplant evaluation for kidney transplant    2. ESRD (end stage renal disease) (Multi)      Trever Ellison is a good candidate for kidney transplant. He will receive a kidney from his father as a living donor.   Cardiac workup reviewed and adequate  CT chest nonnecrotizing granulomatous inflammation with negative AFB and fungal stain. Asymptomatic and thought to be drug related (vedolizumab) or Crohn's related  No recent flare of Crohn's disease  Per Dr. Hermosillo (GI), will maintain on Vedolizumab and hold for 4-8 weeks to receive transplant, reassess need for medication after transplant.   Last Vedolizumab infusion was 3/4/2024  PAT visit scheduled. Pre-procedure blood work scheduled  XM reviewed and is negative  Thymoglobulin for induction  Covid testing scheduled for 4/23  Surgery scheduled for 4/25

## 2024-04-16 NOTE — H&P (VIEW-ONLY)
Subjective   Trever Ellison is a 19 y.o. male who presents for pre operative visit with kidney transplant surgery scheduled for this month.    Kidney disease was thought to be due to Ramicade from treatment of Crohn's disease.  He is approved by the kidney selection committee listed for kidney transplantation.  He has his father as living donor who is a good candidate for donation.  They are compatible and surgery scheduled for later this month.      Review of Systems   Constitutional:  Negative for chills and fever.   HENT: Negative.  Negative for congestion.    Eyes: Negative.    Respiratory:  Negative for cough and shortness of breath.    Cardiovascular:  Negative for chest pain.   Gastrointestinal:  Negative for abdominal distention, abdominal pain, constipation and diarrhea.   Endocrine: Negative for cold intolerance and heat intolerance.   Genitourinary:  Negative for dysuria, frequency, hematuria and urgency.   Musculoskeletal:  Negative for arthralgias.   Skin:  Negative for color change.   Allergic/Immunologic: Negative for environmental allergies.   Neurological:  Negative for dizziness, weakness and light-headedness.   Hematological:  Negative for adenopathy.   Psychiatric/Behavioral:  Negative for agitation, confusion and hallucinations.         Objective   Vitals:    04/16/24 1145   BP: (!) 143/112   Pulse: (!) 116   Temp: 35.3 °C (95.5 °F)   SpO2: 96%       Physical Exam  Constitutional:       Appearance: Normal appearance.   HENT:      Head: Normocephalic and atraumatic.      Nose: Nose normal.   Eyes:      Pupils: Pupils are equal, round, and reactive to light.   Cardiovascular:      Rate and Rhythm: Normal rate.   Pulmonary:      Effort: Pulmonary effort is normal. No respiratory distress.   Abdominal:      General: There is no distension.      Palpations: Abdomen is soft. There is no mass.      Comments: PD catheter RLQ   Musculoskeletal:         General: No swelling. Normal range of motion.       "Cervical back: Normal range of motion.   Skin:     General: Skin is warm and dry.   Neurological:      General: No focal deficit present.      Mental Status: He is alert and oriented to person, place, and time.   Psychiatric:         Mood and Affect: Mood normal.         Behavior: Behavior normal.          Lab Review    Blood Type: No results found for: \"ABORH\"  Lab Results   Component Value Date    CREATININE CANCELED 07/16/2023    K CANCELED 07/16/2023    GLUCOSE CANCELED 07/16/2023    HCT 38.2 (L) 08/18/2023    WBC 10.1 08/18/2023     08/18/2023    CALCIUM CANCELED 07/16/2023     Lab Results   Component Value Date    WBC 10.1 08/18/2023    HGB 11.5 (L) 08/18/2023    HCT 38.2 (L) 08/18/2023    MCV 89 08/18/2023     08/18/2023     Lab Results   Component Value Date    GLUCOSE CANCELED 07/16/2023    CALCIUM CANCELED 07/16/2023    NA CANCELED 07/16/2023    K CANCELED 07/16/2023    CO2 CANCELED 07/16/2023    CL CANCELED 07/16/2023    BUN CANCELED 07/16/2023    CREATININE CANCELED 07/16/2023       Current Outpatient Medications:     calcitriol (Rocaltrol) 0.25 mcg capsule, Take by mouth., Disp: , Rfl:     methoxy peg-epoetin beta (MIRCERA INJ), Inject 30 mcg under the skin., Disp: , Rfl:     psyllium husk, with sugar, (Fiber, psyllium husk-sugar,) 3.4 gram/12 gram powder, Take by mouth once daily., Disp: , Rfl:     acetaminophen (Tylenol) 325 mg capsule, Take by mouth., Disp: , Rfl:     amitriptyline (Elavil) 10 mg tablet, TAKE 1 TABLET (10 MG) BY MOUTH ONCE DAILY AT BEDTIME., Disp: 90 tablet, Rfl: 4    calcium carbonate (Tums) 200 mg calcium chewable tablet, Chew 1 tablet (500 mg) twice a day., Disp: , Rfl:     cholecalciferol (Vitamin D-3) 50 mcg (2,000 unit) capsule, Take 1 capsule (50 mcg) by mouth once daily., Disp: , Rfl:     ergocalciferol (Vitamin D-2) 1.25 MG (66144 UT) capsule, Take by mouth per week., Disp: , Rfl:     gentamicin (Garamycin) 0.1 % cream, APPLY SMALL AMOUNT TO EXIT SITE DAILY, " Disp: , Rfl:     NON FORMULARY, AIRBORNE CHEW   CHEW AND SWALLOW 1 TAB DAILY PRN, Disp: , Rfl:     sevelamer (Renagel) 800 mg tablet, Take 1 tablet (800 mg) by mouth 3 times a day with meals., Disp: , Rfl:     vedolizumab (Entyvio) 300 mg injection, Infuse 1 Dose into a venous catheter., Disp: , Rfl:         Echo 9/2013    CONCLUSIONS:  1. Left ventricular systolic function is normal with a 55-60% estimated ejection fraction.  2. Spectral Doppler shows a normal pattern of left ventricular diastolic filling.    CT chest 10/2023  IMPRESSION:  A 20 x 11 mm peripheral opacity in the lingula has slightly increased  in size, however, the appearance is linear/bandlike, suggesting  scarring. The remainder of the previously seen areas of consolidation  and nodular opacities have significantly decreased in size and now  have the appearance most consistent with postinfectious or  postinflammatory scarring.      No acute cardiopulmonary process.    Assessment/Plan    Diagnoses:   1. Pre-transplant evaluation for kidney transplant    2. ESRD (end stage renal disease) (Multi)      Trever Ellison is a good candidate for kidney transplant. He will receive a kidney from his father as a living donor.   Cardiac workup reviewed and adequate  CT chest nonnecrotizing granulomatous inflammation with negative AFB and fungal stain. Asymptomatic and thought to be drug related (vedolizumab) or Crohn's related  No recent flare of Crohn's disease  Per Dr. Hermosillo (GI), will maintain on Vedolizumab and hold for 4-8 weeks to receive transplant, reassess need for medication after transplant.   Last Vedolizumab infusion was 3/4/2024  PAT visit scheduled. Pre-procedure blood work scheduled  XM reviewed and is negative  Thymoglobulin for induction  Covid testing scheduled for 4/23  Surgery scheduled for 4/25

## 2024-04-16 NOTE — PROGRESS NOTES
Patient came to clinic for his pre op surgeon appointment.  Patient came with his mom. Reviewed pre op appointment letter and instructions, patient given a copy of the letter.  Patient is still on PD dialysis.  Reviewed meds and allergies.  Patient BP was 143/112- Dr. Williamson notified - NTD at this time.  Patient understands that he will need to complete an EKG, chest xray , blood work and visit with PAT today.  Patient and his mom's questions answered.

## 2024-04-16 NOTE — PREPROCEDURE INSTRUCTIONS
Fasting Guidelines    NPO Instructions:    Do not eat any food after midnight the night before your surgery/procedure.  You may have up to TEN ounces of clear liquids until TWO hours before your instructed arrival time to the hospital. This includes water, black tea/coffee, (no milk or cream), apple juice, and/or electrolyte drinks (Gatorade).  You may chew gum up to TWO hours before your surgery/procedure.    Additional Instructions:    Avoid herbal supplements, multivitamins and NSAIDS (non-steroidal anti-inflammatory drugs) such as Advil, Aleve, Ibuprofen, Naproxen, Excedrin, Meloxicam or Celebrex for at least 7 days prior to surgery. May take Tylenol as needed.    Avoid tobacco and alcohol products for 24 hours prior to surgery.    CONTACT SURGEON'S OFFICE IF YOU DEVELOP:  * Fever = 100.4 F   * New respiratory symptoms (e.g. cough, shortness of breath, respiratory distress, sore throat)  * Recent loss of taste or smell  *Flu like symptoms such as headache, fatigue or gastrointestinal symptoms  * You develop any open sores, shingles, burning or painful urination   AND/OR:  * You no longer wish to have the surgery.  * Any other personal circumstances change that may lead to the need to cancel or defer this surgery.  *You were admitted to any hospital within one week of your planned procedure.    Seven/Six Days before Surgery:  Review your medication instructions, stop indicated medications    Day of Surgery:  Review your medication instructions, take indicated medications  Wear comfortable loose fitting clothing  Do not use moisturizers, creams, lotions or perfume  All jewelry and valuables should be left at home    [facility]  [facility]  [phone]  [phone]  [email or web address]    [facility]  [phone]       Preoperative Brain Exercises    What are brain exercises?  A brain exercise is any activity that  engages your thinking (cognitive) skills.    What types of activities are considered brain exercises?  Jigsaw puzzles, crossword puzzles, word jumble, memory games, word search, and many more.  Many can be found free online or on your phone via a mobile bertram.    Why should I do brain exercises before my surgery?  More recent research has shown brain exercise before surgery can lower the risk of postoperative delirium (confusion) which can be especially important for older adults.  Patients who did brain exercises for 5 to 10 hours the days before surgery, cut their risk of postoperative delirium in half up to 1 week after surgery.         [facility]    Preoperative Deep Breathing Exercises    Why it is important to do deep breathing exercises before my surgery?  Deep breathing exercises strengthen your breathing muscles.  This helps you to recover after your surgery and decreases the chance of breathing complications.      How are the deep breathing exercises done?  Sit straight with your back supported.  Breathe in deeply and slowly through your nose. Your lower rib cage should expand and your abdomen may move forward.  Hold that breath for 3 to 5 seconds.  Breathe out through pursed lips, slowly and completely.  Rest and repeat 10 times every hour while awake.  Rest longer if you become dizzy or lightheaded.         Patient and Family Education             Ways You Can Help Prevent Blood Clots             This handout explains some simple things you can do to help prevent blood clots.      Blood clots are blockages that can form in the body's veins. When a blood clot forms in your deep veins, it may be called a deep vein thrombosis, or DVT for short. Blood clots can happen in any part of the body where blood flows, but they are most common in the arms and legs. If a piece of a blood clot breaks free and travels to the lungs, it is called a pulmonary embolus (PE). A PE can be a very  serious problem.         Being in the hospital or having surgery can raise your chances of getting a blood clot because you may not be well enough to move around as much as you normally do.         Ways you can help prevent blood clots in the hospital         Wearing SCDs. SCDs stands for Sequential Compression Devices.   SCDs are special sleeves that wrap around your legs  They attach to a pump that fills them with air to gently squeeze your legs every few minutes.   This helps return the blood in your legs to your heart.   SCDs should only be taken off when walking or bathing.   SCDs may not be comfortable, but they can help save your life.               Wearing compression stockings - if your doctor orders them. These special snug fitting stockings gently squeeze your legs to help blood flow.       Walking. Walking helps move the blood in your legs.   If your doctor says it is ok, try walking the halls at least   5 times a day. Ask us to help you get up, so you don't fall.      Taking any blood thinning medicines your doctor orders.        Page 1 of 2     [facility]; 3/23   Ways you can help prevent blood clots at home       Wearing compression stockings - if your doctor orders them. ? Walking - to help move the blood in your legs.       Taking any blood thinning medicines your doctor orders.      Signs of a blood clot or PE      Tell your doctor or nurse know right away if you have of the problems listed below.    If you are at home, seek medical care right away. Call 911 for chest pain or problems breathing.               Signs of a blood clot (DVT) - such as pain,  swelling, redness or warmth in your arm or leg      Signs of a pulmonary embolism (PE) - such as chest     pain or feeling short of breath

## 2024-04-17 ENCOUNTER — TELEPHONE (OUTPATIENT)
Dept: TRANSPLANT | Facility: HOSPITAL | Age: 19
End: 2024-04-17
Payer: COMMERCIAL

## 2024-04-17 ENCOUNTER — LAB REQUISITION (OUTPATIENT)
Dept: LAB | Facility: CLINIC | Age: 19
End: 2024-04-17
Payer: COMMERCIAL

## 2024-04-17 DIAGNOSIS — N18.6 END STAGE RENAL DISEASE (MULTI): ICD-10-CM

## 2024-04-17 LAB — HOLD SPECIMEN: NORMAL

## 2024-04-17 NOTE — TELEPHONE ENCOUNTER
Spoke with Ginger to go over questions she had regarding prep for surgery. She inquired about eKlvin doing PD the night before surgery. I confirmed that he should do so as planned and to come to surgery the next morning empty. Ginger said that she will work with their dialysis team to coordinate this.

## 2024-04-18 LAB
CMV IGM SERPL-ACNC: <8 AU/ML
FLOW AUTOCROSSMATCH: NORMAL
HLA RESULTS: NORMAL
HLA RESULTS: NORMAL
HLA-A+B+C AB NFR SER: NORMAL %
HLA-A+B+C AB NFR SER: NORMAL %
HLA-DP+DQ+DR AB NFR SER: NORMAL %
HLA-DP+DQ+DR AB NFR SER: NORMAL %

## 2024-04-19 LAB
FLOW ALLOCROSSMATCH: NORMAL
HLA RESULTS: NORMAL

## 2024-04-22 ENCOUNTER — DOCUMENTATION (OUTPATIENT)
Dept: TRANSPLANT | Facility: HOSPITAL | Age: 19
End: 2024-04-22
Payer: COMMERCIAL

## 2024-04-22 PROBLEM — Z94.0 KIDNEY TRANSPLANT RECIPIENT (HHS-HCC): Status: ACTIVE | Noted: 2024-04-22

## 2024-04-22 LAB
ATRIAL RATE: 108 BPM
P AXIS: 78 DEGREES
P OFFSET: 210 MS
P ONSET: 162 MS
PR INTERVAL: 118 MS
Q ONSET: 221 MS
QRS COUNT: 18 BEATS
QRS DURATION: 94 MS
QT INTERVAL: 330 MS
QTC CALCULATION(BAZETT): 442 MS
QTC FREDERICIA: 401 MS
R AXIS: 102 DEGREES
T AXIS: 55 DEGREES
T OFFSET: 386 MS
VENTRICULAR RATE: 108 BPM

## 2024-04-22 NOTE — PROGRESS NOTES
Per 04/17/24 jalen De Los Santos [facility]  auth# [id number] on file for Zone III-Txp Admission-Eff 04/25/24.

## 2024-04-23 ENCOUNTER — LAB (OUTPATIENT)
Dept: LAB | Facility: LAB | Age: 19
End: 2024-04-23
Payer: COMMERCIAL

## 2024-04-23 DIAGNOSIS — Z01.818 PRE-TRANSPLANT EVALUATION FOR KIDNEY TRANSPLANT: Primary | ICD-10-CM

## 2024-04-23 DIAGNOSIS — Z01.818 PRE-TRANSPLANT EVALUATION FOR KIDNEY TRANSPLANT: ICD-10-CM

## 2024-04-23 PROCEDURE — 87635 SARS-COV-2 COVID-19 AMP PRB: CPT

## 2024-04-24 ENCOUNTER — ANESTHESIA EVENT (OUTPATIENT)
Dept: OPERATING ROOM | Facility: HOSPITAL | Age: 19
DRG: 652 | End: 2024-04-24
Payer: COMMERCIAL

## 2024-04-24 ENCOUNTER — TELEPHONE (OUTPATIENT)
Dept: TRANSPLANT | Facility: HOSPITAL | Age: 19
End: 2024-04-24
Payer: COMMERCIAL

## 2024-04-24 LAB — SARS-COV-2 RNA RESP QL NAA+PROBE: NOT DETECTED

## 2024-04-24 NOTE — PROGRESS NOTES
Pharmacy Medication History Review    Trever Ellison is a 19 y.o. male who is planned to be admitted for ESRD (end stage renal disease) (Multi). Pharmacy called the patient prior to their scheduled procedure and reviewed the patient's [id number] medications for accuracy.    Medications ADDED:  none  Medications CHANGED:  none  Medications REMOVED:   Tums - therapy complete  Ergocalciferol 50,000U - therapy complete  Ariborne - therapy complete    Please review medication list and comments regarding how patient may be taking medications differently in the Admit Orders Activity.    Preferred pharmacy and allergies to be confirmed with patient by nursing the day of procedure.     Sources used to complete the med history include spoke with patient, surescripts, oarrs, care everywhere    Below are additional concerns with the patient's PTA list.  Patient stopped supplements/vitamins a week ago in preparation of procedure    Svitlana Miranda    [facility]eds [facility]  Please reach out via Secure Chat for questions

## 2024-04-24 NOTE — TELEPHONE ENCOUNTER
Ginger phoned to see if it would be okay if she had Kelvin do a manual PD exchange tonight as she would be able to better control him emptying. She plans to have him do a 3hr dwell 6:00pm and empty at 9:00pm. After confirming plan with Dr. Clay Miles was given the okay to proceed with the plan.

## 2024-04-25 ENCOUNTER — HOSPITAL ENCOUNTER (INPATIENT)
Facility: HOSPITAL | Age: 19
LOS: 4 days | Discharge: HOME | DRG: 652 | End: 2024-04-29
Attending: TRANSPLANT SURGERY | Admitting: TRANSPLANT SURGERY
Payer: COMMERCIAL

## 2024-04-25 ENCOUNTER — SURGERY (OUTPATIENT)
Age: 19
DRG: 652 | End: 2024-04-25
Payer: COMMERCIAL

## 2024-04-25 ENCOUNTER — SPECIALTY PHARMACY (OUTPATIENT)
Dept: PHARMACY | Facility: CLINIC | Age: 19
End: 2024-04-25

## 2024-04-25 ENCOUNTER — APPOINTMENT (OUTPATIENT)
Dept: CARDIOLOGY | Facility: HOSPITAL | Age: 19
DRG: 652 | End: 2024-04-25
Payer: COMMERCIAL

## 2024-04-25 ENCOUNTER — DOCUMENTATION (OUTPATIENT)
Dept: TRANSPLANT | Facility: HOSPITAL | Age: 19
End: 2024-04-25
Payer: COMMERCIAL

## 2024-04-25 ENCOUNTER — APPOINTMENT (OUTPATIENT)
Dept: RADIOLOGY | Facility: HOSPITAL | Age: 19
DRG: 652 | End: 2024-04-25
Payer: COMMERCIAL

## 2024-04-25 ENCOUNTER — ANESTHESIA (OUTPATIENT)
Dept: OPERATING ROOM | Facility: HOSPITAL | Age: 19
DRG: 652 | End: 2024-04-25
Payer: COMMERCIAL

## 2024-04-25 DIAGNOSIS — Z94.0 KIDNEY TRANSPLANT RECIPIENT (HHS-HCC): ICD-10-CM

## 2024-04-25 DIAGNOSIS — N18.6 ESRD (END STAGE RENAL DISEASE) (MULTI): Primary | ICD-10-CM

## 2024-04-25 DIAGNOSIS — Z94.0 KIDNEY REPLACED BY TRANSPLANT (HHS-HCC): ICD-10-CM

## 2024-04-25 PROBLEM — R07.9 CHEST PAIN, UNSPECIFIED: Status: RESOLVED | Noted: 2023-12-18 | Resolved: 2024-04-25

## 2024-04-25 PROBLEM — G40.89 OTHER SEIZURES (MULTI): Status: RESOLVED | Noted: 2023-12-18 | Resolved: 2024-04-25

## 2024-04-25 LAB
ALBUMIN SERPL BCP-MCNC: 3.8 G/DL (ref 3.4–5)
ANION GAP BLDV CALCULATED.4IONS-SCNC: 15 MMOL/L (ref 10–25)
ANION GAP BLDV CALCULATED.4IONS-SCNC: 16 MMOL/L (ref 10–25)
ANION GAP BLDV CALCULATED.4IONS-SCNC: 16 MMOL/L (ref 10–25)
ANION GAP BLDV CALCULATED.4IONS-SCNC: ABNORMAL MMOL/L
ANION GAP SERPL CALC-SCNC: 17 MMOL/L (ref 10–20)
BASE EXCESS BLDV CALC-SCNC: -15.3 MMOL/L (ref -2–3)
BASE EXCESS BLDV CALC-SCNC: -6.4 MMOL/L (ref -2–3)
BASE EXCESS BLDV CALC-SCNC: -7.8 MMOL/L (ref -2–3)
BASE EXCESS BLDV CALC-SCNC: -8.3 MMOL/L (ref -2–3)
BASOPHILS # BLD AUTO: 0.01 X10*3/UL (ref 0–0.1)
BASOPHILS NFR BLD AUTO: 0.2 %
BODY TEMPERATURE: 37 DEGREES CELSIUS
BUN SERPL-MCNC: 52 MG/DL (ref 6–23)
CA-I BLDV-SCNC: 0.61 MMOL/L (ref 1.1–1.33)
CA-I BLDV-SCNC: 0.97 MMOL/L (ref 1.1–1.33)
CA-I BLDV-SCNC: 1.01 MMOL/L (ref 1.1–1.33)
CA-I BLDV-SCNC: 1.02 MMOL/L (ref 1.1–1.33)
CALCIUM SERPL-MCNC: 7 MG/DL (ref 8.6–10.6)
CHLORIDE BLDV-SCNC: 102 MMOL/L (ref 98–107)
CHLORIDE BLDV-SCNC: 105 MMOL/L (ref 98–107)
CHLORIDE BLDV-SCNC: 107 MMOL/L (ref 98–107)
CHLORIDE BLDV-SCNC: ABNORMAL MMOL/L
CHLORIDE SERPL-SCNC: 103 MMOL/L (ref 98–107)
CMV IGG AVIDITY SERPL IA-RTO: NONREACTIVE %
CO2 SERPL-SCNC: 18 MMOL/L (ref 21–32)
CREAT SERPL-MCNC: 5.12 MG/DL (ref 0.5–1.3)
EBV NA AB SER QL: NEGATIVE
EGFRCR SERPLBLD CKD-EPI 2021: 16 ML/MIN/1.73M*2
EOSINOPHIL # BLD AUTO: 0 X10*3/UL (ref 0–0.7)
EOSINOPHIL NFR BLD AUTO: 0 %
ERYTHROCYTE [DISTWIDTH] IN BLOOD BY AUTOMATED COUNT: 12.8 % (ref 11.5–14.5)
GLUCOSE BLDV-MCNC: 102 MG/DL (ref 74–99)
GLUCOSE BLDV-MCNC: 119 MG/DL (ref 74–99)
GLUCOSE BLDV-MCNC: 68 MG/DL (ref 74–99)
GLUCOSE BLDV-MCNC: 81 MG/DL (ref 74–99)
GLUCOSE SERPL-MCNC: 103 MG/DL (ref 74–99)
HBV CORE AB SER QL: NONREACTIVE
HBV SURFACE AB SER-ACNC: <3.1 MIU/ML
HBV SURFACE AG SERPL QL IA: NONREACTIVE
HCO3 BLDV-SCNC: 18.3 MMOL/L (ref 22–26)
HCO3 BLDV-SCNC: 19.1 MMOL/L (ref 22–26)
HCO3 BLDV-SCNC: 19.9 MMOL/L (ref 22–26)
HCO3 BLDV-SCNC: 9.6 MMOL/L (ref 22–26)
HCT VFR BLD AUTO: 30.1 % (ref 41–52)
HCT VFR BLD EST: 16 % (ref 41–52)
HCT VFR BLD EST: 27 % (ref 41–52)
HCT VFR BLD EST: 33 % (ref 41–52)
HCT VFR BLD EST: 37 % (ref 41–52)
HCV AB SER QL: NONREACTIVE
HGB BLD-MCNC: 10 G/DL (ref 13.5–17.5)
HGB BLDV-MCNC: 11 G/DL (ref 13.5–17.5)
HGB BLDV-MCNC: 12.4 G/DL (ref 13.5–17.5)
HGB BLDV-MCNC: 5.4 G/DL (ref 13.5–17.5)
HGB BLDV-MCNC: 8.9 G/DL (ref 13.5–17.5)
HIV 1+2 AB+HIV1 P24 AG SERPL QL IA: NONREACTIVE
IMM GRANULOCYTES # BLD AUTO: 0.01 X10*3/UL (ref 0–0.7)
IMM GRANULOCYTES NFR BLD AUTO: 0.2 % (ref 0–0.9)
INHALED O2 CONCENTRATION: 21 %
INHALED O2 CONCENTRATION: 44 %
LACTATE BLDV-SCNC: 0.4 MMOL/L (ref 0.4–2)
LACTATE BLDV-SCNC: 0.8 MMOL/L (ref 0.4–2)
LACTATE BLDV-SCNC: 0.9 MMOL/L (ref 0.4–2)
LACTATE BLDV-SCNC: 1.8 MMOL/L (ref 0.4–2)
LYMPHOCYTES # BLD AUTO: 0.11 X10*3/UL (ref 1.2–4.8)
LYMPHOCYTES NFR BLD AUTO: 2.4 %
MCH RBC QN AUTO: 29 PG (ref 26–34)
MCHC RBC AUTO-ENTMCNC: 33.2 G/DL (ref 32–36)
MCV RBC AUTO: 87 FL (ref 80–100)
MONOCYTES # BLD AUTO: 0.03 X10*3/UL (ref 0.1–1)
MONOCYTES NFR BLD AUTO: 0.6 %
NEUTROPHILS # BLD AUTO: 4.48 X10*3/UL (ref 1.2–7.7)
NEUTROPHILS NFR BLD AUTO: 96.6 %
NRBC BLD-RTO: 0 /100 WBCS (ref 0–0)
OXYHGB MFR BLDV: 70.7 % (ref 45–75)
OXYHGB MFR BLDV: 94.4 % (ref 45–75)
OXYHGB MFR BLDV: 96.5 % (ref 45–75)
OXYHGB MFR BLDV: 97.2 % (ref 45–75)
PCO2 BLDV: 19 MM HG (ref 41–51)
PCO2 BLDV: 37 MM HG (ref 41–51)
PCO2 BLDV: 39 MM HG (ref 41–51)
PCO2 BLDV: 52 MM HG (ref 41–51)
PH BLDV: 7.19 PH (ref 7.33–7.43)
PH BLDV: 7.28 PH (ref 7.33–7.43)
PH BLDV: 7.31 PH (ref 7.33–7.43)
PH BLDV: 7.32 PH (ref 7.33–7.43)
PHOSPHATE SERPL-MCNC: 3.5 MG/DL (ref 2.5–4.9)
PLATELET # BLD AUTO: 138 X10*3/UL (ref 150–450)
PO2 BLDV: 114 MM HG (ref 35–45)
PO2 BLDV: 45 MM HG (ref 35–45)
PO2 BLDV: 74 MM HG (ref 35–45)
PO2 BLDV: 93 MM HG (ref 35–45)
POTASSIUM BLDV-SCNC: 1.4 MMOL/L (ref 3.5–5.3)
POTASSIUM BLDV-SCNC: 3.1 MMOL/L (ref 3.5–5.3)
POTASSIUM SERPL-SCNC: 3.5 MMOL/L (ref 3.5–5.3)
RBC # BLD AUTO: 3.45 X10*6/UL (ref 4.5–5.9)
SAO2 % BLDV: 100 % (ref 45–75)
SAO2 % BLDV: 72 % (ref 45–75)
SAO2 % BLDV: 96 % (ref 45–75)
SAO2 % BLDV: 99 % (ref 45–75)
SODIUM BLDV-SCNC: 134 MMOL/L (ref 136–145)
SODIUM BLDV-SCNC: 137 MMOL/L (ref 136–145)
SODIUM BLDV-SCNC: 138 MMOL/L (ref 136–145)
SODIUM BLDV-SCNC: 143 MMOL/L (ref 136–145)
SODIUM SERPL-SCNC: 134 MMOL/L (ref 136–145)
WBC # BLD AUTO: 4.6 X10*3/UL (ref 4.4–11.3)

## 2024-04-25 PROCEDURE — 8110000001 HC LIVE DONOR - KIDNEY: Performed by: TRANSPLANT SURGERY

## 2024-04-25 PROCEDURE — 76776 US EXAM K TRANSPL W/DOPPLER: CPT

## 2024-04-25 PROCEDURE — C2617 STENT, NON-COR, TEM W/O DEL: HCPCS | Performed by: TRANSPLANT SURGERY

## 2024-04-25 PROCEDURE — 2500000004 HC RX 250 GENERAL PHARMACY W/ HCPCS (ALT 636 FOR OP/ED): Performed by: STUDENT IN AN ORGANIZED HEALTH CARE EDUCATION/TRAINING PROGRAM

## 2024-04-25 PROCEDURE — 7100000002 HC RECOVERY ROOM TIME - EACH INCREMENTAL 1 MINUTE: Performed by: TRANSPLANT SURGERY

## 2024-04-25 PROCEDURE — 76776 US EXAM K TRANSPL W/DOPPLER: CPT | Performed by: RADIOLOGY

## 2024-04-25 PROCEDURE — 87389 HIV-1 AG W/HIV-1&-2 AB AG IA: CPT

## 2024-04-25 PROCEDURE — 3700000001 HC GENERAL ANESTHESIA TIME - INITIAL BASE CHARGE: Performed by: TRANSPLANT SURGERY

## 2024-04-25 PROCEDURE — 86803 HEPATITIS C AB TEST: CPT

## 2024-04-25 PROCEDURE — 2780000003 HC OR 278 NO HCPCS: Performed by: TRANSPLANT SURGERY

## 2024-04-25 PROCEDURE — 50360 RNL ALTRNSPLJ W/O RCP NFRCT: CPT | Performed by: TRANSPLANT SURGERY

## 2024-04-25 PROCEDURE — 2500000004 HC RX 250 GENERAL PHARMACY W/ HCPCS (ALT 636 FOR OP/ED)

## 2024-04-25 PROCEDURE — 0TY00Z0 TRANSPLANTATION OF RIGHT KIDNEY, ALLOGENEIC, OPEN APPROACH: ICD-10-PCS | Performed by: TRANSPLANT SURGERY

## 2024-04-25 PROCEDURE — A50360 PR TRANSPLANTATION OF KIDNEY: Performed by: ANESTHESIOLOGIST ASSISTANT

## 2024-04-25 PROCEDURE — 2720000007 HC OR 272 NO HCPCS: Performed by: TRANSPLANT SURGERY

## 2024-04-25 PROCEDURE — 86706 HEP B SURFACE ANTIBODY: CPT

## 2024-04-25 PROCEDURE — 0WPG03Z REMOVAL OF INFUSION DEVICE FROM PERITONEAL CAVITY, OPEN APPROACH: ICD-10-PCS | Performed by: TRANSPLANT SURGERY

## 2024-04-25 PROCEDURE — 85025 COMPLETE CBC W/AUTO DIFF WBC: CPT

## 2024-04-25 PROCEDURE — 2500000001 HC RX 250 WO HCPCS SELF ADMINISTERED DRUGS (ALT 637 FOR MEDICARE OP)

## 2024-04-25 PROCEDURE — 2500000005 HC RX 250 GENERAL PHARMACY W/O HCPCS: Performed by: ANESTHESIOLOGIST ASSISTANT

## 2024-04-25 PROCEDURE — 36415 COLL VENOUS BLD VENIPUNCTURE: CPT

## 2024-04-25 PROCEDURE — 3600000004 HC OR TIME - INITIAL BASE CHARGE - PROCEDURE LEVEL FOUR: Performed by: TRANSPLANT SURGERY

## 2024-04-25 PROCEDURE — 76942 ECHO GUIDE FOR BIOPSY: CPT | Performed by: STUDENT IN AN ORGANIZED HEALTH CARE EDUCATION/TRAINING PROGRAM

## 2024-04-25 PROCEDURE — 1200000002 HC GENERAL ROOM WITH TELEMETRY DAILY

## 2024-04-25 PROCEDURE — 87340 HEPATITIS B SURFACE AG IA: CPT

## 2024-04-25 PROCEDURE — P9045 ALBUMIN (HUMAN), 5%, 250 ML: HCPCS | Mod: JZ | Performed by: ANESTHESIOLOGIST ASSISTANT

## 2024-04-25 PROCEDURE — 84132 ASSAY OF SERUM POTASSIUM: CPT

## 2024-04-25 PROCEDURE — 7100000001 HC RECOVERY ROOM TIME - INITIAL BASE CHARGE: Performed by: TRANSPLANT SURGERY

## 2024-04-25 PROCEDURE — A50360 PR TRANSPLANTATION OF KIDNEY: Performed by: STUDENT IN AN ORGANIZED HEALTH CARE EDUCATION/TRAINING PROGRAM

## 2024-04-25 PROCEDURE — 49422 REMOVE TUNNELED IP CATH: CPT | Performed by: TRANSPLANT SURGERY

## 2024-04-25 PROCEDURE — 3700000002 HC GENERAL ANESTHESIA TIME - EACH INCREMENTAL 1 MINUTE: Performed by: TRANSPLANT SURGERY

## 2024-04-25 PROCEDURE — 93005 ELECTROCARDIOGRAM TRACING: CPT

## 2024-04-25 PROCEDURE — 86644 CMV ANTIBODY: CPT

## 2024-04-25 PROCEDURE — 86664 EPSTEIN-BARR NUCLEAR ANTIGEN: CPT

## 2024-04-25 PROCEDURE — 2500000004 HC RX 250 GENERAL PHARMACY W/ HCPCS (ALT 636 FOR OP/ED): Performed by: ANESTHESIOLOGIST ASSISTANT

## 2024-04-25 PROCEDURE — C1757 CATH, THROMBECTOMY/EMBOLECT: HCPCS | Performed by: TRANSPLANT SURGERY

## 2024-04-25 PROCEDURE — 3600000009 HC OR TIME - EACH INCREMENTAL 1 MINUTE - PROCEDURE LEVEL FOUR: Performed by: TRANSPLANT SURGERY

## 2024-04-25 PROCEDURE — 50605 INSERT URETERAL SUPPORT: CPT | Performed by: TRANSPLANT SURGERY

## 2024-04-25 PROCEDURE — 82435 ASSAY OF BLOOD CHLORIDE: CPT

## 2024-04-25 PROCEDURE — 86704 HEP B CORE ANTIBODY TOTAL: CPT

## 2024-04-25 PROCEDURE — 87522 HEPATITIS C REVRS TRNSCRPJ: CPT

## 2024-04-25 DEVICE — URETERAL STENT
Type: IMPLANTABLE DEVICE | Site: URETER | Status: NON-FUNCTIONAL
Brand: POLARIS™ ULTRA
Removed: 2024-05-17

## 2024-04-25 RX ORDER — ALBUMIN HUMAN 50 G/1000ML
SOLUTION INTRAVENOUS AS NEEDED
Status: DISCONTINUED | OUTPATIENT
Start: 2024-04-25 | End: 2024-04-25

## 2024-04-25 RX ORDER — ACETAMINOPHEN 325 MG/1
650 TABLET ORAL EVERY 6 HOURS PRN
Status: DISCONTINUED | OUTPATIENT
Start: 2024-04-27 | End: 2024-04-29 | Stop reason: HOSPADM

## 2024-04-25 RX ORDER — SODIUM CHLORIDE 450 MG/100ML
60 INJECTION, SOLUTION INTRAVENOUS CONTINUOUS
Status: DISCONTINUED | OUTPATIENT
Start: 2024-04-25 | End: 2024-04-26

## 2024-04-25 RX ORDER — SODIUM CHLORIDE, SODIUM LACTATE, POTASSIUM CHLORIDE, CALCIUM CHLORIDE 600; 310; 30; 20 MG/100ML; MG/100ML; MG/100ML; MG/100ML
100 INJECTION, SOLUTION INTRAVENOUS CONTINUOUS
Status: DISCONTINUED | OUTPATIENT
Start: 2024-04-25 | End: 2024-04-25 | Stop reason: HOSPADM

## 2024-04-25 RX ORDER — MIDAZOLAM HYDROCHLORIDE 1 MG/ML
INJECTION INTRAMUSCULAR; INTRAVENOUS AS NEEDED
Status: DISCONTINUED | OUTPATIENT
Start: 2024-04-25 | End: 2024-04-25

## 2024-04-25 RX ORDER — ONDANSETRON HYDROCHLORIDE 2 MG/ML
4 INJECTION, SOLUTION INTRAVENOUS ONCE AS NEEDED
Status: DISCONTINUED | OUTPATIENT
Start: 2024-04-25 | End: 2024-04-25 | Stop reason: HOSPADM

## 2024-04-25 RX ORDER — TACROLIMUS 1 MG/1
1 CAPSULE ORAL EVERY 12 HOURS
Status: DISCONTINUED | OUTPATIENT
Start: 2024-04-25 | End: 2024-04-27

## 2024-04-25 RX ORDER — HYDROMORPHONE HYDROCHLORIDE 1 MG/ML
INJECTION, SOLUTION INTRAMUSCULAR; INTRAVENOUS; SUBCUTANEOUS AS NEEDED
Status: DISCONTINUED | OUTPATIENT
Start: 2024-04-25 | End: 2024-04-25

## 2024-04-25 RX ORDER — OXYCODONE HYDROCHLORIDE 5 MG/1
5 TABLET ORAL EVERY 4 HOURS PRN
Status: DISPENSED | OUTPATIENT
Start: 2024-04-25 | End: 2024-04-28

## 2024-04-25 RX ORDER — NALOXONE HYDROCHLORIDE 0.4 MG/ML
0.2 INJECTION, SOLUTION INTRAMUSCULAR; INTRAVENOUS; SUBCUTANEOUS EVERY 5 MIN PRN
Status: DISCONTINUED | OUTPATIENT
Start: 2024-04-25 | End: 2024-04-29 | Stop reason: HOSPADM

## 2024-04-25 RX ORDER — MANNITOL 20 G/100ML
INJECTION, SOLUTION INTRAVENOUS CONTINUOUS PRN
Status: DISCONTINUED | OUTPATIENT
Start: 2024-04-25 | End: 2024-04-25

## 2024-04-25 RX ORDER — LIDOCAINE HYDROCHLORIDE 10 MG/ML
0.1 INJECTION INFILTRATION; PERINEURAL ONCE
Status: DISCONTINUED | OUTPATIENT
Start: 2024-04-25 | End: 2024-04-25 | Stop reason: HOSPADM

## 2024-04-25 RX ORDER — FENTANYL CITRATE 50 UG/ML
INJECTION, SOLUTION INTRAMUSCULAR; INTRAVENOUS AS NEEDED
Status: DISCONTINUED | OUTPATIENT
Start: 2024-04-25 | End: 2024-04-25

## 2024-04-25 RX ORDER — SODIUM CHLORIDE, SODIUM LACTATE, POTASSIUM CHLORIDE, CALCIUM CHLORIDE 600; 310; 30; 20 MG/100ML; MG/100ML; MG/100ML; MG/100ML
INJECTION, SOLUTION INTRAVENOUS CONTINUOUS PRN
Status: DISCONTINUED | OUTPATIENT
Start: 2024-04-25 | End: 2024-04-25

## 2024-04-25 RX ORDER — SODIUM CHLORIDE 9 MG/ML
500 INJECTION, SOLUTION INTRAVENOUS CONTINUOUS
Status: DISCONTINUED | OUTPATIENT
Start: 2024-04-25 | End: 2024-04-26

## 2024-04-25 RX ORDER — SERTRALINE HYDROCHLORIDE 50 MG/1
50 TABLET, FILM COATED ORAL DAILY
Status: DISCONTINUED | OUTPATIENT
Start: 2024-04-26 | End: 2024-04-25

## 2024-04-25 RX ORDER — PREDNISONE 20 MG/1
20 TABLET ORAL DAILY
Status: DISCONTINUED | OUTPATIENT
Start: 2024-04-29 | End: 2024-04-29 | Stop reason: HOSPADM

## 2024-04-25 RX ORDER — DIPHENHYDRAMINE HYDROCHLORIDE 50 MG/ML
INJECTION INTRAMUSCULAR; INTRAVENOUS AS NEEDED
Status: DISCONTINUED | OUTPATIENT
Start: 2024-04-25 | End: 2024-04-25

## 2024-04-25 RX ORDER — GABAPENTIN 300 MG/1
300 CAPSULE ORAL ONCE
Status: COMPLETED | OUTPATIENT
Start: 2024-04-25 | End: 2024-04-25

## 2024-04-25 RX ORDER — HYDROMORPHONE HYDROCHLORIDE 1 MG/ML
0.5 INJECTION, SOLUTION INTRAMUSCULAR; INTRAVENOUS; SUBCUTANEOUS EVERY 5 MIN PRN
Status: DISCONTINUED | OUTPATIENT
Start: 2024-04-25 | End: 2024-04-25 | Stop reason: HOSPADM

## 2024-04-25 RX ORDER — ONDANSETRON HYDROCHLORIDE 2 MG/ML
INJECTION, SOLUTION INTRAVENOUS AS NEEDED
Status: DISCONTINUED | OUTPATIENT
Start: 2024-04-25 | End: 2024-04-25

## 2024-04-25 RX ORDER — ESCITALOPRAM OXALATE 10 MG/1
5 TABLET ORAL DAILY
Status: DISCONTINUED | OUTPATIENT
Start: 2024-04-26 | End: 2024-04-25

## 2024-04-25 RX ORDER — CEFAZOLIN SODIUM 2 G/100ML
2 INJECTION, SOLUTION INTRAVENOUS EVERY 8 HOURS
Status: COMPLETED | OUTPATIENT
Start: 2024-04-25 | End: 2024-04-26

## 2024-04-25 RX ORDER — ONDANSETRON HYDROCHLORIDE 2 MG/ML
4 INJECTION, SOLUTION INTRAVENOUS EVERY 8 HOURS PRN
Status: DISCONTINUED | OUTPATIENT
Start: 2024-04-25 | End: 2024-04-29 | Stop reason: HOSPADM

## 2024-04-25 RX ORDER — FUROSEMIDE 10 MG/ML
INJECTION INTRAMUSCULAR; INTRAVENOUS AS NEEDED
Status: DISCONTINUED | OUTPATIENT
Start: 2024-04-25 | End: 2024-04-25

## 2024-04-25 RX ORDER — ACETAMINOPHEN 325 MG/1
650 TABLET ORAL EVERY 6 HOURS SCHEDULED
Status: COMPLETED | OUTPATIENT
Start: 2024-04-26 | End: 2024-04-27

## 2024-04-25 RX ORDER — PROPOFOL 10 MG/ML
INJECTION, EMULSION INTRAVENOUS AS NEEDED
Status: DISCONTINUED | OUTPATIENT
Start: 2024-04-25 | End: 2024-04-25

## 2024-04-25 RX ORDER — CEFAZOLIN 1 G/1
INJECTION, POWDER, FOR SOLUTION INTRAVENOUS AS NEEDED
Status: DISCONTINUED | OUTPATIENT
Start: 2024-04-25 | End: 2024-04-25

## 2024-04-25 RX ORDER — SENNOSIDES 8.6 MG/1
1 TABLET ORAL 2 TIMES DAILY
Status: DISCONTINUED | OUTPATIENT
Start: 2024-04-25 | End: 2024-04-29 | Stop reason: HOSPADM

## 2024-04-25 RX ORDER — MYCOPHENOLATE MOFETIL 250 MG/1
1000 CAPSULE ORAL EVERY 12 HOURS
Status: DISCONTINUED | OUTPATIENT
Start: 2024-04-25 | End: 2024-04-29 | Stop reason: HOSPADM

## 2024-04-25 RX ORDER — ONDANSETRON 4 MG/1
4 TABLET, ORALLY DISINTEGRATING ORAL EVERY 8 HOURS PRN
Status: DISCONTINUED | OUTPATIENT
Start: 2024-04-25 | End: 2024-04-29 | Stop reason: HOSPADM

## 2024-04-25 RX ORDER — CEFEPIME 1 G/50ML
1 INJECTION, SOLUTION INTRAVENOUS ONCE
Status: DISCONTINUED | OUTPATIENT
Start: 2024-04-25 | End: 2024-04-25

## 2024-04-25 RX ORDER — ACETAMINOPHEN 10 MG/ML
15 INJECTION, SOLUTION INTRAVENOUS ONCE
Status: COMPLETED | OUTPATIENT
Start: 2024-04-25 | End: 2024-04-25

## 2024-04-25 RX ORDER — HYDROMORPHONE HYDROCHLORIDE 1 MG/ML
0.2 INJECTION, SOLUTION INTRAMUSCULAR; INTRAVENOUS; SUBCUTANEOUS EVERY 5 MIN PRN
Status: DISCONTINUED | OUTPATIENT
Start: 2024-04-25 | End: 2024-04-25 | Stop reason: HOSPADM

## 2024-04-25 RX ORDER — SODIUM CHLORIDE 9 MG/ML
999 INJECTION, SOLUTION INTRAVENOUS CONTINUOUS
Status: DISCONTINUED | OUTPATIENT
Start: 2024-04-25 | End: 2024-04-26

## 2024-04-25 RX ORDER — LIDOCAINE HYDROCHLORIDE 20 MG/ML
INJECTION, SOLUTION INFILTRATION; PERINEURAL AS NEEDED
Status: DISCONTINUED | OUTPATIENT
Start: 2024-04-25 | End: 2024-04-25

## 2024-04-25 RX ORDER — ACETAMINOPHEN 325 MG/1
975 TABLET ORAL ONCE
Status: COMPLETED | OUTPATIENT
Start: 2024-04-25 | End: 2024-04-25

## 2024-04-25 RX ORDER — POLYETHYLENE GLYCOL 3350 17 G/17G
17 POWDER, FOR SOLUTION ORAL DAILY PRN
Status: DISCONTINUED | OUTPATIENT
Start: 2024-04-25 | End: 2024-04-29 | Stop reason: HOSPADM

## 2024-04-25 RX ORDER — HEPARIN SODIUM 1000 [USP'U]/ML
INJECTION, SOLUTION INTRAVENOUS; SUBCUTANEOUS AS NEEDED
Status: DISCONTINUED | OUTPATIENT
Start: 2024-04-25 | End: 2024-04-25

## 2024-04-25 RX ORDER — ROCURONIUM BROMIDE 10 MG/ML
INJECTION, SOLUTION INTRAVENOUS AS NEEDED
Status: DISCONTINUED | OUTPATIENT
Start: 2024-04-25 | End: 2024-04-25

## 2024-04-25 RX ADMIN — HYDROMORPHONE HYDROCHLORIDE 0.2 MG: 1 INJECTION, SOLUTION INTRAMUSCULAR; INTRAVENOUS; SUBCUTANEOUS at 15:08

## 2024-04-25 RX ADMIN — DEXTROSE MONOHYDRATE 500 MG: 50 INJECTION, SOLUTION INTRAVENOUS at 10:55

## 2024-04-25 RX ADMIN — GABAPENTIN 300 MG: 300 CAPSULE ORAL at 09:50

## 2024-04-25 RX ADMIN — SODIUM CHLORIDE, POTASSIUM CHLORIDE, SODIUM LACTATE AND CALCIUM CHLORIDE: 600; 310; 30; 20 INJECTION, SOLUTION INTRAVENOUS at 09:56

## 2024-04-25 RX ADMIN — SODIUM CHLORIDE 60 ML/HR: 4.5 INJECTION, SOLUTION INTRAVENOUS at 14:45

## 2024-04-25 RX ADMIN — CEFAZOLIN SODIUM 2 G: 2 INJECTION, SOLUTION INTRAVENOUS at 18:56

## 2024-04-25 RX ADMIN — ALBUMIN HUMAN 250 ML: 0.05 INJECTION, SOLUTION INTRAVENOUS at 12:53

## 2024-04-25 RX ADMIN — PROPOFOL 150 MG: 10 INJECTION, EMULSION INTRAVENOUS at 10:05

## 2024-04-25 RX ADMIN — HYDROMORPHONE HYDROCHLORIDE 0.4 MG: 1 INJECTION, SOLUTION INTRAMUSCULAR; INTRAVENOUS; SUBCUTANEOUS at 13:52

## 2024-04-25 RX ADMIN — ROCURONIUM BROMIDE 50 MG: 10 INJECTION INTRAVENOUS at 10:05

## 2024-04-25 RX ADMIN — Medication 75 MG: at 11:12

## 2024-04-25 RX ADMIN — CEFAZOLIN 2 G: 1 INJECTION, POWDER, FOR SOLUTION INTRAMUSCULAR; INTRAVENOUS at 10:14

## 2024-04-25 RX ADMIN — HEPARIN SODIUM 2000 UNITS: 1000 INJECTION INTRAVENOUS; SUBCUTANEOUS at 11:53

## 2024-04-25 RX ADMIN — ACETAMINOPHEN 975 MG: 325 TABLET ORAL at 09:50

## 2024-04-25 RX ADMIN — SODIUM CHLORIDE 400 ML/HR: 9 INJECTION, SOLUTION INTRAVENOUS at 14:45

## 2024-04-25 RX ADMIN — HYDROMORPHONE HYDROCHLORIDE 0.2 MG: 1 INJECTION, SOLUTION INTRAMUSCULAR; INTRAVENOUS; SUBCUTANEOUS at 15:20

## 2024-04-25 RX ADMIN — PROPOFOL 50 MG: 10 INJECTION, EMULSION INTRAVENOUS at 10:10

## 2024-04-25 RX ADMIN — LIDOCAINE HYDROCHLORIDE 70 MG: 20 INJECTION, SOLUTION INFILTRATION; PERINEURAL at 10:05

## 2024-04-25 RX ADMIN — MYCOPHENOLATE MOFETIL 1000 MG: 250 CAPSULE ORAL at 18:56

## 2024-04-25 RX ADMIN — HYDROMORPHONE HYDROCHLORIDE 0.4 MG: 1 INJECTION, SOLUTION INTRAMUSCULAR; INTRAVENOUS; SUBCUTANEOUS at 12:33

## 2024-04-25 RX ADMIN — MIDAZOLAM HYDROCHLORIDE 2 MG: 1 INJECTION, SOLUTION INTRAMUSCULAR; INTRAVENOUS at 09:56

## 2024-04-25 RX ADMIN — FENTANYL CITRATE 50 MCG: 50 INJECTION, SOLUTION INTRAMUSCULAR; INTRAVENOUS at 10:09

## 2024-04-25 RX ADMIN — OXYCODONE HYDROCHLORIDE 5 MG: 5 TABLET ORAL at 21:15

## 2024-04-25 RX ADMIN — SENNOSIDES 8.6 MG: 8.6 TABLET, FILM COATED ORAL at 21:15

## 2024-04-25 RX ADMIN — FENTANYL CITRATE 50 MCG: 50 INJECTION, SOLUTION INTRAMUSCULAR; INTRAVENOUS at 10:05

## 2024-04-25 RX ADMIN — ONDANSETRON 4 MG: 2 INJECTION INTRAMUSCULAR; INTRAVENOUS at 13:28

## 2024-04-25 RX ADMIN — ROCURONIUM BROMIDE 15 MG: 10 INJECTION INTRAVENOUS at 11:55

## 2024-04-25 RX ADMIN — HYDROMORPHONE HYDROCHLORIDE 0.2 MG: 1 INJECTION, SOLUTION INTRAMUSCULAR; INTRAVENOUS; SUBCUTANEOUS at 12:50

## 2024-04-25 RX ADMIN — ACETAMINOPHEN 600 MG: 10 INJECTION INTRAVENOUS at 17:10

## 2024-04-25 RX ADMIN — DIPHENHYDRAMINE HYDROCHLORIDE 25 MG: 50 INJECTION INTRAMUSCULAR; INTRAVENOUS at 10:16

## 2024-04-25 RX ADMIN — ROCURONIUM BROMIDE 15 MG: 10 INJECTION INTRAVENOUS at 12:50

## 2024-04-25 RX ADMIN — ALBUMIN HUMAN 250 ML: 0.05 INJECTION, SOLUTION INTRAVENOUS at 12:43

## 2024-04-25 RX ADMIN — MANNITOL: 20 INJECTION, SOLUTION INTRAVENOUS at 12:29

## 2024-04-25 RX ADMIN — SUGAMMADEX 200 MG: 100 INJECTION, SOLUTION INTRAVENOUS at 13:50

## 2024-04-25 RX ADMIN — ACETAMINOPHEN 650 MG: 325 TABLET ORAL at 23:04

## 2024-04-25 RX ADMIN — TACROLIMUS 1 MG: 1 CAPSULE ORAL at 18:56

## 2024-04-25 RX ADMIN — FUROSEMIDE 100 MG: 10 INJECTION INTRAMUSCULAR; INTRAVENOUS at 12:29

## 2024-04-25 SDOH — HEALTH STABILITY: MENTAL HEALTH: CURRENT SMOKER: 0

## 2024-04-25 ASSESSMENT — COGNITIVE AND FUNCTIONAL STATUS - GENERAL
DAILY ACTIVITIY SCORE: 24
CLIMB 3 TO 5 STEPS WITH RAILING: A LITTLE
MOBILITY SCORE: 23

## 2024-04-25 ASSESSMENT — PAIN - FUNCTIONAL ASSESSMENT
PAIN_FUNCTIONAL_ASSESSMENT: 0-10
PAIN_FUNCTIONAL_ASSESSMENT: UNABLE TO SELF-REPORT
PAIN_FUNCTIONAL_ASSESSMENT: 0-10
PAIN_FUNCTIONAL_ASSESSMENT: UNABLE TO SELF-REPORT
PAIN_FUNCTIONAL_ASSESSMENT: 0-10

## 2024-04-25 ASSESSMENT — PAIN SCALES - GENERAL
PAINLEVEL_OUTOF10: 1
PAINLEVEL_OUTOF10: 1
PAINLEVEL_OUTOF10: 3
PAINLEVEL_OUTOF10: 3
PAINLEVEL_OUTOF10: 0 - NO PAIN
PAINLEVEL_OUTOF10: 1
PAINLEVEL_OUTOF10: 1
PAINLEVEL_OUTOF10: 0 - NO PAIN
PAINLEVEL_OUTOF10: 3
PAINLEVEL_OUTOF10: 0 - NO PAIN
PAINLEVEL_OUTOF10: 0 - NO PAIN
PAINLEVEL_OUTOF10: 4
PAINLEVEL_OUTOF10: 0 - NO PAIN
PAINLEVEL_OUTOF10: 1

## 2024-04-25 ASSESSMENT — COLUMBIA-SUICIDE SEVERITY RATING SCALE - C-SSRS
1. IN THE PAST MONTH, HAVE YOU WISHED YOU WERE DEAD OR WISHED YOU COULD GO TO SLEEP AND NOT WAKE UP?: NO
2. HAVE YOU ACTUALLY HAD ANY THOUGHTS OF KILLING YOURSELF?: NO
6. HAVE YOU EVER DONE ANYTHING, STARTED TO DO ANYTHING, OR PREPARED TO DO ANYTHING TO END YOUR LIFE?: NO

## 2024-04-25 ASSESSMENT — PAIN DESCRIPTION - ORIENTATION: ORIENTATION: RIGHT

## 2024-04-25 ASSESSMENT — PAIN DESCRIPTION - LOCATION: LOCATION: ABDOMEN

## 2024-04-25 NOTE — PROGRESS NOTES
Pharmacist Post-Transplant Note    The Clinical Transplant Pharmacist is aware that Trever Ellison has been admitted and has undergone living donor kidney transplantation. A transplant pharmacist will be rounding with the multidisciplinary transplant team and making recommendations on his medication regimen.     The patient's medications have been reviewed in conjunction with the multidisciplinary transplant team. The pharmacist is in agreement with the plan of care for medications at this time.    Patient and donor factors were screened to determine the appropriate post-transplant protocol and current immunosuppression plan includes:    Induction Regimen:  Antithymocyte globulin    Maintenance Regimen:  Tacrolimus, Mycophenolate mofetil, and Methylprednisolone/Prednisone Taper    Maintenance immunosuppression and anti-infective prophylaxis will begin according to protocol. Home medications will begin when the patient is clinically stable. Of note, CMV D-/R- so patient will not require CMV prophylaxis but will require 3 months of HSV prophylaxis with acyclovir per protocol.    Winnie Mcleod, PharmD, BCTXP   Solid Organ Transplant Clinical Pharmacy Specialist

## 2024-04-25 NOTE — ANESTHESIA PREPROCEDURE EVALUATION
Patient: Trever Ellison    Procedure Information       Date/Time: 04/25/24 1005    Procedure: Transplant Kidney    Location: [facility] OR 12 / [facility] OR    Surgeons: Olman Williamson MD            Relevant Problems   Anesthesia (within normal limits)      Cardiac (within normal limits)   (+) Chest pain, unspecified (Resolved)      Pulmonary (within normal limits)      Neuro (within normal limits)   (+) Other seizures (Multi) (Resolved)      GI  On immunosuppression every 8 weeks    (+) Crohn's disease (Multi)   (+) Crohn's disease of both small and large intestine (Multi)   (+) Granulomatous colitis (Multi)   (+) Perianal Crohn's disease (Multi)      /Renal  Last dialysis session 4/24, 1250cc removed    (+) ESRD (end stage renal disease) (Multi)   (+) ESRD (end stage renal disease) on dialysis (Multi)   (+) ESRD needing dialysis (Multi)   (+) ESRD on peritoneal dialysis (Multi)   (+) Hydronephrosis      Liver (within normal limits)      Endocrine   (+) Secondary hyperparathyroidism of renal origin (Multi)      Hematology   (+) Anemia in chronic kidney disease   (+) Iron deficiency anemia, unspecified       Clinical information reviewed:   Tobacco  Allergies  Meds   Med Hx  Surg Hx   Fam Hx  Soc Hx        NPO Detail:  NPO/Void Status  Carbohydrate Drink Given Prior to Surgery? : N  Date of Last Liquid: 04/24/24  Time of Last Liquid: 0000  Date of Last Solid: 04/24/24  Time of Last Solid: 0000  Last Intake Type: Clear fluids  Time of Last Void: 0623         Physical Exam    Airway  Mallampati: I  TM distance: >3 FB  Neck ROM: full     Cardiovascular - normal exam  Rhythm: regular     Dental    Pulmonary - normal exam     Abdominal        Anesthesia Plan    History of general anesthesia?: yes  History of complications of general anesthesia?: no    ASA 3     general     The patient is not a current smoker.  Patient was not previously instructed to abstain from smoking on day of procedure.  Patient did not  smoke on day of procedure.    Anesthetic plan and risks discussed with patient.  Use of blood products discussed with patient who consented to blood products.    Plan discussed with attending.

## 2024-04-25 NOTE — OP NOTE
Transplant Kidney Operative Note     Date: 2024  OR Location: [facility] OR    Name: Trever Ellison, : 2005, Age: 19 y.o., MRN: [id number], Sex: male    Diagnosis  Pre-op Diagnosis     * ESRD (end stage renal disease) (Multi) [N18.6] Post-op Diagnosis     * ESRD (end stage renal disease) (Multi) [N18.6]     Procedures  Bench preparation of donor kidney allograft  Kidney transplant  Insertion of ureteral stent  Removal of PD catheter      Surgeons      * Olman Williamson - Primary    Resident/Fellow/Other Assistant:  Surgeons and Role:  * No surgeons found with a matching role *    Procedure Summary  Anesthesia: General  ASA: III  Anesthesia Staff: Anesthesiologist: Mahsa Brown MD  C-AA: JAYANT Courtney  Estimated Blood Loss: 100 mL  Intra-op Medications:   Administrations occurring from 1005 to 1410 on 24:   Medication Name Total Dose   anti-thymocyte globulin rabbit (Thymoglobulin) 75 mg, hydrocortisone sod succ (PF) (Solu-CORTEF) 20 mg, heparin 1,000 Units in sodium chloride 0.9% 500 mL IV 75 mg   methylPREDNISolone sodium succinate (SOLU-Medrol) 500 mg in dextrose 5 % in water (D5W) 100 mL  mg              Anesthesia Record               Intraprocedure I/O Totals          Intake    mannitol 20% 125.00 mL    lactated Ringer's 2800.00 mL    anti-thymocyte globulin rabbit (Thymoglobulin) 75 mg, hydrocortisone sod succ (PF) (Solu-CORTEF) 20 mg, heparin 1,000 Units in sodium chloride 0.9% 500 mL .00 mL    methylPREDNISolone sodium succinate (SOLU-Medrol) 500 mg in dextrose 5 % in water (D5W) 100 mL .00 mL    Total Intake 3525 mL       Output    Urine 965 mL    Est. Blood Loss 100 mL    Total Output 1065 mL       Net    Net Volume 2460 mL          Specimen: No specimens collected     Staff:   Circulator: Nishant James RN; Senthil Matthews RN  Relief Circulator: Svitlana Kidd RN  Relief Scrub: Anders Turk  Scrub Person: Carmelo Cordova RN         Drains and/or Catheters:    Urethral Catheter Non-latex 16 Fr. (Active)   Site Assessment Clean;Skin intact 04/25/24 1800   Collection Container Standard drainage bag 04/25/24 1800   Securement Method Securing device (Describe) 04/25/24 1800   Reason for Continuing Urinary Catheterization accurate hourly measurement of urine volume in a critically ill patient that cannot be assessed by other volumes and urine collection strategies 04/25/24 1800   Output (mL) 350 mL 04/25/24 1745           Implants:  Implants       Type Name Action Serial No.      Stent STENT, POLARIS ULTRA 5FR X 12CM, W/O WIRE - [id number] Implanted               Findings:   Mild Iliac artery spasm responded to warm saline and papaverine  Single artery, vein, ureter      Indications: Trever Ellison is an 19 y.o. male who is having surgery for Kidney transplant    The patient was seen in the preoperative area. The risks, benefits, complications, treatment options, non-operative alternatives, expected recovery and outcomes were discussed with the patient. The possibilities of reaction to medication, pulmonary aspiration, injury to surrounding structures, bleeding, recurrent infection, the need for additional procedures, failure to diagnose a condition, and creating a complication requiring transfusion or operation were discussed with the patient. The patient concurred with the proposed plan, giving informed consent.  The site of surgery was properly noted/marked if necessary per policy. The patient has been actively warmed in preoperative area. Preoperative antibiotics have been ordered and given within 1 hours of incision. Venous thrombosis prophylaxis have been ordered including bilateral sequential compression devices    Procedure Details:     Donor Crossclamp date and time 4/25/2024 1108  Out of Ice/first anastomosis time 4/25/2024 1158  Reperfusion time   4/25/2024 1227    The patient was brought to the operating room, placed in a supine position, a huddle was performed.  Sequential compression devices were placed. At this point, ABO verification was performed confirming correct organ and compatibility with patient awake prior to organ arrival. UNOS ID [id number]. After this, general endotracheal anesthesia was induced.  The patient was given IV antibiotics and a 3 way Jean Baptiste catheter. Appropriate lines were placed by Anesthesia service.  The abdomen was shaved, prepped, and draped in the usual sterile fashion. Methylprednisolone and Thymoglobulin_ were administered. A Fraire incision was made in the __RLQ and carried down to the subcutaneous tissue and the abdominal wall fascia. The Retroperitoneal space was entered. The peritoneum was peeled medially. The epigastric vessels were double ligated and divided using silk tie and surgical clips. The external iliac artery and vein were exposed. The bookwalter retractor was placed and the lymphatics overlying the vessels were serially ligated and divided.     At this point, I scrubbed out to join Dr. Dill in the donor room and flushed and kidney with preservation solution. I brought th kidney in the room and performed ABO verification with organ present. Then the kidney was removed from the Storage using sterile technique and brought up into a basin with ice. It was the LEFT kidney. The Gerota's fat was sharply removed using Metzenbaum scissors. The ureter had been tagged and protected. The renal vein was identified, and all branches and tributaries tied off. The artery was skeletonized. The periarterial tissue was carefully dissected and tied. Following this, the remainder of the perinephric fat were tied-off and removed. Once this was done, the organ was replaced in a basin full of slushed ice in preparation for implantation.     There was some mild spasm on the external iliac artery that improved with warm saline and topical papaverin. Due to the small size of the artery, I decided to heparinize for this case. I asked the anesthesia team  to give 2000 units of systemic heparin. We applied atraumatic vascular clamps on the iliac vein and the donor kidney was brought to the operative field. We made an appropriate size venotomy and the donor  renal vein was anastomosed to the recipient RIGHT External Iliac vein in an end-to-side fashion with running 5-0 prolene suture. An arteriotomy was made and the donor renal artery was anastomosed to the recipient RIGHT External iliac artery in an end to side fashion with running 6-0 prolene suture. The patient was simultaneously loaded with IV mannitol, Lasix and volume. The clamps were removed and kidney allograft reperfused. Anastomosis time was 29_ mins. The total Cold ischemic time was 1 hour 19 minutes. The kidney had an excellent reperfusion and was Pink and had Firm turgor. Hemostasis was meticulously achieved.     The bladder was identified by clamping the mata and filled with irrigation fluids. The donor ureter was shortened to the appropriate length and spatulated. Ureteroneocystostomy was created using 5-0 PDS in running fashion over double J ureteral stent. Lich-Gregoir was performed to prevent reflux using 4-0 PDS. The kidney was making urine.    Hemostasis was checked, the anastomoses inspected, and the kidney placed in the iliac fossa. After placement, the vessel lay was inspected and found to be acceptable. The kidney position was Extra-peritoneal. Next, the PD catheter was removed by dissecting free the 2 cuffs. The fascial defect was closed using prolene. The field was irrigated thoroughly. The retractor was removed and the abdominal wall fascia re-approximated with running #1 Prolene suture in 2 layers. Subcutaneous tissues were irrigated, and approximated with 3-0 Vicryl stitches.  The skin was closed with 4-0 monocryl. All counts were correct. I was presented in the entire case.      Complications:  None; patient tolerated the procedure well.    Disposition: PACU - hemodynamically  stable.  Condition: stable       Attending Attestation: I was present and scrubbed for the entire procedure.    Olman Clay  Phone Number: [phone]

## 2024-04-25 NOTE — HOSPITAL COURSE
Pt is a 18 y/o male with a hx of ESRD secondary to Ramicade for treatment of Crohn's disease whom received a living donor kidney transplant on 4/25/24 by Dr. Williamson with a PRA of 0.  HCV -/- and has not met risk factors. EBV +/-. Left donor kidney transplanted to patient right pelvis. Admission weight is 43.7 kg (discharge weight is 48.8 kg ).  Pt received a total of 4.5 mg/kg total of thymoglobulin induction therapy in conjunction with 500mg IV solumedrol.  Pt was initiated on Tacrolimus & Cellcept immunosuppression in conjunction with IV solumedrol taper.  Pt was started on acyclovir (CMV D - / R - ) and bactrim for CMV & PCP prophylaxis per protocol. He was started on clotrimazole as antifungal coverage per protocol. Operative course was uncomplicated.  Hospital course was uncomplicated. Jean Baptiste catheter was removed on POD #3 and the patient is voiding spontaneously. Pt did not require dialysis and electrolytes remain stable. Dialysis access via PD cath, which was removed in OR. BP & glucose under good control.    Pt is tolerating a regular diet, maintaining adequate hydration with oral intake, ambulating independently and having BMs. Immunosuppression was managed by the transplant team. Patient is in stable condition for discharge home with renal telehealth today and homecare for drain. RX delivered to bedside prior to discharge. The patient will f/u in the transplant institute and have labs drawn in the walk-in clinic.

## 2024-04-25 NOTE — PROCEDURES
Peripheral Block    Patient location during procedure: pre-op  Start time: 4/25/2024 8:50 AM  End time: 4/25/2024 8:54 AM  Reason for block: at surgeon's request and post-op pain management  Staffing  Performed: resident   Authorized by: Joe Schneider DO    Performed by: Caleb Hicks DO  Preanesthetic Checklist  Completed: patient identified, IV checked, site marked, risks and benefits discussed, surgical consent, monitors and equipment checked, pre-op evaluation and timeout performed   Timeout performed at: 4/25/2024 8:48 AM  Peripheral Block  Patient position: laying flat  Prep: ChloraPrep  Patient monitoring: heart rate and continuous pulse ox  Block type: QL  Laterality: right  Injection technique: single-shot  Guidance: ultrasound guided  Local infiltration: ropivacaine  Needle  Needle type: Tuohy   Needle gauge: 26 G  Needle length: 8 cm  Needle localization: ultrasound guidance     image stored in chart  Assessment  Injection assessment: negative aspiration for heme, no paresthesia on injection, incremental injection and local visualized surrounding nerve on ultrasound  Additional Notes  QL single shot. informed consent obtained. risks and benefits discussed. ASA monitors placed, timeout performed. Pt positioned, prepped with chlorhexidine, draped with sterile towels. Ultrasound guidance used with visualization of the needle throughout duration of the procedure. Aspiration was negative. A total of 30 cc 0.5% ropivacaine, 100mcg epinephrine, and 4mg decadron injected between both sides. Patient tolerated procedure well.     Timeout by Audrey WANG

## 2024-04-25 NOTE — CONSULTS
Consults  Acute Pain Service    Trever Ellison is a 19 y.o. year old male patient who presents for kidney transplant with Dr. Mcdonough. Acute Pain consulted for block for postoperative pain control.     Anticipated Postop Pain Issues -   Palliative: typically relieved with IV analgesics and regional local anesthetics  Provocative: typically with movement  Quality: typically burning and aching  Radiation: typically none  Severity: typically severe 8-10/10  Timing: typically constant    Past Medical History:   Diagnosis Date    Acute Crohn's disease (Multi)     colitis    Aluminum bone disease     Anemia     Angina pectoris (CMS-HCC)     Cachexia (Multi)     End stage chronic kidney disease (Multi)     Dialysis    Hemodialysis patient (CMS-[facility])     Hydronephrosis     1 yo    Nephritis     Tachycardia     Vitamin D deficiency         Past Surgical History:   Procedure Laterality Date    COLON SURGERY  2014    crohns    COLONOSCOPY      CT GUIDED CHEST TUBE PLACEMENT  06/28/2023    CT GUIDED CHEST TUBE PLACEMENT 6/28/2023 [facility]    CT GUIDED PERCUTANEOUS BIOPSY KIDNEY  2023    CT GUIDED PERCUTANEOUS BIOPSY LUNG  06/28/2023    CT GUIDED PERCUTANEOUS BIOPSY LUNG 6/28/2023 [facility] CT    IR TUNNELED DIALYSIS CATHETER  2023    LUNG BIOPSY  2023    UPPER GASTROINTESTINAL ENDOSCOPY          Family History   Problem Relation Name Age of Onset    Graves' disease Mother      Hypertension Father      Kidney disease Brother      Heart disease Maternal Grandmother      Hypertension Maternal Grandfather      COPD Paternal Grandmother      Heart disease Paternal Grandmother      Accidental death Paternal Grandfather      Other (Other) Father's Sister          AMYOTROPHIC LATERAL SCLEROSIS    Hyperlipidemia Other MAT RELATIVES     Hyperlipidemia Other PAT RELATIVES         Social History     Socioeconomic History    Marital status: Single     Spouse name: Not on file    Number of children: Not on file    Years of education: Not on file    Highest  "education level: Not on file   Occupational History    Not on file   Tobacco Use    Smoking status: Never    Smokeless tobacco: Never   Vaping Use    Vaping status: Never Used   Substance and Sexual Activity    Alcohol use: Never    Drug use: Never    Sexual activity: Not Currently   Other Topics Concern    Not on file   Social History Narrative    Not on file     Social Determinants of Health     Financial Resource Strain: Not on file   Food Insecurity: No Food Insecurity (6/15/2021)    Received from [facility]    Hunger Vital Sign     Worried About Running Out of Food in the Last Year: Never true     Ran Out of Food in the Last Year: Never true   Transportation Needs: Not on file   Physical Activity: Not on file   Stress: Not on file   Social Connections: Not on file   Intimate Partner Violence: Not on file   Housing Stability: Not on file        Allergies   Allergen Reactions    Cephalexin Rash    Methotrexate Headache, Other and Unknown     \"Did not tolerate well\", per mom. \" He would get sick, it caused nausea and headaches.\"    Adhesive Tape-Silicones Rash     Redness, raw skin         Review of Systems  Gen: No fatigue, anorexia, insomnia, fever.   Eyes: No vision loss, double vision, drainage, eye pain.   ENT: No pharyngitis, dry mouth, no hearing changes or ear discharge  Cardiac: No chest pain, palpitations, syncope, near syncope.   Pulmonary: No shortness of breath, cough, hemoptysis.   Heme/lymph: No swollen glands, fever, bleeding.   GI: No abdominal pain, change in bowel habits, melena, hematemesis, hematochezia, nausea, vomiting, diarrhea.   : No discharge, dysuria, frequency, urgency, hematuria.  Endo: No polyuria or weight loss.   Musculoskeletal: Negative for any pain or loss of ROM/weakness  Skin: No rashes or lesions  Neuro: Normal speech, no numbness or weakness. No gait difficulties  Review of systems is otherwise negative unless stated above or in history of present " illness.    Physical Exam:  Constitutional:  no distress, alert and cooperative  Eyes: clear sclera  Head/Neck: No apparent injury, trachea midline  Respiratory/Thorax: Patent airways, thorax symmetric, breathing comfortably  Cardiovascular: no pitting edema  Gastrointestinal: Nondistended  Musculoskeletal: ROM intact  Extremities: no clubbing  Neurological: alert, espino x4  Psychological: Appropriate affect    Results for orders placed or performed during the hospital encounter of 04/25/24 (from the past 24 hour(s))   Blood Gas Venous Full Panel   Result Value Ref Range    POCT pH, Venous 7.32 (L) 7.33 - 7.43 pH    POCT pCO2, Venous 37 (L) 41 - 51 mm Hg    POCT pO2, Venous 114 (H) 35 - 45 mm Hg    POCT SO2, Venous 100 (H) 45 - 75 %    POCT Oxy Hemoglobin, Venous 97.2 (H) 45.0 - 75.0 %    POCT Hematocrit Calculated, Venous 37.0 (L) 41.0 - 52.0 %    POCT Sodium, Venous 137 136 - 145 mmol/L    POCT Potassium, Venous 3.1 (L) 3.5 - 5.3 mmol/L    POCT Chloride, Venous 105 98 - 107 mmol/L    POCT Ionized Calicum, Venous 1.01 (L) 1.10 - 1.33 mmol/L    POCT Glucose, Venous 81 74 - 99 mg/dL    POCT Lactate, Venous 0.8 0.4 - 2.0 mmol/L    POCT Base Excess, Venous -6.4 (L) -2.0 - 3.0 mmol/L    POCT HCO3 Calculated, Venous 19.1 (L) 22.0 - 26.0 mmol/L    POCT Hemoglobin, Venous 12.4 (L) 13.5 - 17.5 g/dL    POCT Anion Gap, Venous 16.0 10.0 - 25.0 mmol/L    Patient Temperature 37.0 degrees Celsius    FiO2 21 %   ECG 12 Lead   Result Value Ref Range    Ventricular Rate 80 BPM    Atrial Rate 80 BPM    IL Interval 136 ms    QRS Duration 94 ms    QT Interval 382 ms    QTC Calculation(Bazett) 440 ms    P Axis 78 degrees    R Axis 95 degrees    T Axis 81 degrees    QRS Count 13 beats    Q Onset 217 ms    P Onset 149 ms    P Offset 197 ms    T Offset 408 ms    QTC Fredericia 420 ms   Hepatitis B Surface Antibody   Result Value Ref Range    Hepatitis B Surface AB <3.1 <10.0 mIU/mL        Plan:    - Right QL single shot blocks performed  preoperatively on 4/25  - Pain medications per primary team  - Will see on POD1 if inpatient    Acute Pain Team  pg [phone]  [phone].

## 2024-04-25 NOTE — ANESTHESIA POSTPROCEDURE EVALUATION
Patient: Trever Ellison    Procedure Summary       Date: 04/25/24 Room / Location: [facility] OR 12 / [facility] OR    Anesthesia Start: 0953 Anesthesia Stop: 1414    Procedure: Transplant Kidney Diagnosis:       ESRD (end stage renal disease) (Multi)      (ESRD (end stage renal disease) (CMS/[facility]) [N18.6])    Surgeons: Olman Williamson MD Responsible Provider: Mahsa Brown MD    Anesthesia Type: general ASA Status: 3            Anesthesia Type: general    Vitals Value Taken Time   /67 04/25/24 1415   Temp 36.4 °C (97.5 °F) 04/25/24 1405   Pulse 110 04/25/24 1421   Resp 16 04/25/24 1421   SpO2 100 % 04/25/24 1421   Vitals shown include unfiled device data.    Anesthesia Post Evaluation    Patient location during evaluation: PACU  Patient participation: complete - patient participated  Level of consciousness: awake and alert  Pain management: satisfactory to patient  Airway patency: patent  Cardiovascular status: acceptable and blood pressure returned to baseline  Respiratory status: acceptable  Hydration status: acceptable  Postoperative Nausea and Vomiting: none    There were no known notable events for this encounter.

## 2024-04-25 NOTE — ANESTHESIA PROCEDURE NOTES
Airway  Date/Time: 4/25/2024 10:08 AM  Urgency: elective    Airway not difficult    Staffing  Performed: TAYLER   Authorized by: Mahsa Brown MD    Performed by: JAYANT Courtney  Patient location during procedure: OR    Indications and Patient Condition  Indications for airway management: anesthesia  Spontaneous Ventilation: absent  Sedation level: deep  Preoxygenated: yes  MILS not maintained throughout  Mask difficulty assessment: 1 - vent by mask    Final Airway Details  Final airway type: endotracheal airway      Successful airway: ETT  Cuffed: yes   Successful intubation technique: direct laryngoscopy  Facilitating devices/methods: intubating stylet  Endotracheal tube insertion site: oral  Blade: Jessica  Blade size: #4  ETT size (mm): 7.5  Cormack-Lehane Classification: grade I - full view of glottis  Placement verified by: capnometry   Cuff volume (mL): 8  Measured from: lips  ETT to lips (cm): 22  Number of attempts at approach: 1  Number of other approaches attempted: 0    Additional Comments  Intubation atraumatic. Dentition intact.

## 2024-04-25 NOTE — BRIEF OP NOTE
Date: 2024  OR Location: [facility] OR    Name: Trever Ellison, : 2005, Age: 19 y.o., MRN: [id number], Sex: male    Diagnosis  Pre-op Diagnosis     * ESRD (end stage renal disease) (Multi) [N18.6] Post-op Diagnosis     * ESRD (end stage renal disease) (Multi) [N18.6]     Procedures  Transplant Kidney  52101 - IL RENAL ALTRNSPLJ IMPLTJ GRF W/O RCP NEPHRECTOMY      Surgeons      * Olman Williamson - Primary    Resident/Fellow/Other Assistant:  Surgeons and Role:  * No surgeons found with a matching role *    Procedure Summary  Anesthesia: General  ASA: III  Anesthesia Staff: Anesthesiologist: Mahsa Brown MD  C-AA: JAYANT Courtney  Estimated Blood Loss: 100mL  Intra-op Medications:   Administrations occurring from 1005 to 1410 on 24:   Medication Name Total Dose   anti-thymocyte globulin rabbit (Thymoglobulin) 75 mg, hydrocortisone sod succ (PF) (Solu-CORTEF) 20 mg, heparin 1,000 Units in sodium chloride 0.9% 500 mL IV 75 mg   methylPREDNISolone sodium succinate (SOLU-Medrol) 500 mg in dextrose 5 % in water (D5W) 100 mL  mg              Anesthesia Record               Intraprocedure I/O Totals          Intake    mannitol 20% 125.00 mL    lactated Ringer's 2800.00 mL    anti-thymocyte globulin rabbit (Thymoglobulin) 75 mg, hydrocortisone sod succ (PF) (Solu-CORTEF) 20 mg, heparin 1,000 Units in sodium chloride 0.9% 500 mL .00 mL    methylPREDNISolone sodium succinate (SOLU-Medrol) 500 mg in dextrose 5 % in water (D5W) 100 mL .00 mL    Total Intake 3525 mL       Output    Urine 965 mL    Est. Blood Loss 100 mL    Total Output 1065 mL       Net    Net Volume 2460 mL          Specimen: No specimens collected     Staff:   Circulator: Nishant James RN; Senthil Matthews RN  Relief Circulator: Svitlana Kidd RN  Relief Scrub: Anders Turk  Scrub Person: Carmelo Cordova RN          Findings: Normal donor kidney anatomy. Donor kidney placed in R iliac fossa. Peritoneal dialysis  catheter removed. No immediate complications.     Complications:  None; patient tolerated the procedure well.     Disposition: PACU - hemodynamically stable.  Condition: stable  Specimens Collected: No specimens collected  Attending Attestation: I was present and scrubbed for the entire procedure.    Olman Williamson  Phone Number: [phone]

## 2024-04-26 ENCOUNTER — DOCUMENTATION (OUTPATIENT)
Dept: TRANSPLANT | Facility: HOSPITAL | Age: 19
End: 2024-04-26
Payer: COMMERCIAL

## 2024-04-26 LAB
ALBUMIN SERPL BCP-MCNC: 2.5 G/DL (ref 3.4–5)
ALBUMIN SERPL BCP-MCNC: 3 G/DL (ref 3.4–5)
ALBUMIN SERPL BCP-MCNC: 3.2 G/DL (ref 3.4–5)
ANION GAP SERPL CALC-SCNC: 14 MMOL/L (ref 10–20)
ANION GAP SERPL CALC-SCNC: 16 MMOL/L (ref 10–20)
ANION GAP SERPL CALC-SCNC: 9 MMOL/L (ref 10–20)
ATRIAL RATE: 80 BPM
BASOPHILS # BLD AUTO: 0.01 X10*3/UL (ref 0–0.1)
BASOPHILS NFR BLD AUTO: 0.1 %
BUN SERPL-MCNC: 26 MG/DL (ref 6–23)
BUN SERPL-MCNC: 29 MG/DL (ref 6–23)
BUN SERPL-MCNC: 42 MG/DL (ref 6–23)
CALCIUM SERPL-MCNC: 5.8 MG/DL (ref 8.6–10.6)
CALCIUM SERPL-MCNC: 7 MG/DL (ref 8.6–10.6)
CALCIUM SERPL-MCNC: 7.6 MG/DL (ref 8.6–10.6)
CHLORIDE SERPL-SCNC: 110 MMOL/L (ref 98–107)
CHLORIDE SERPL-SCNC: 111 MMOL/L (ref 98–107)
CHLORIDE SERPL-SCNC: 122 MMOL/L (ref 98–107)
CO2 SERPL-SCNC: 16 MMOL/L (ref 21–32)
CO2 SERPL-SCNC: 17 MMOL/L (ref 21–32)
CO2 SERPL-SCNC: 20 MMOL/L (ref 21–32)
CREAT SERPL-MCNC: 1.59 MG/DL (ref 0.5–1.3)
CREAT SERPL-MCNC: 1.61 MG/DL (ref 0.5–1.3)
CREAT SERPL-MCNC: 3.13 MG/DL (ref 0.5–1.3)
EGFRCR SERPLBLD CKD-EPI 2021: 28 ML/MIN/1.73M*2
EGFRCR SERPLBLD CKD-EPI 2021: 62 ML/MIN/1.73M*2
EGFRCR SERPLBLD CKD-EPI 2021: 63 ML/MIN/1.73M*2
EOSINOPHIL # BLD AUTO: 0 X10*3/UL (ref 0–0.7)
EOSINOPHIL NFR BLD AUTO: 0 %
ERYTHROCYTE [DISTWIDTH] IN BLOOD BY AUTOMATED COUNT: 13 % (ref 11.5–14.5)
GLUCOSE SERPL-MCNC: 119 MG/DL (ref 74–99)
GLUCOSE SERPL-MCNC: 91 MG/DL (ref 74–99)
GLUCOSE SERPL-MCNC: 94 MG/DL (ref 74–99)
HCT VFR BLD AUTO: 27.5 % (ref 41–52)
HCV RNA SERPL NAA+PROBE-ACNC: NOT DETECTED K[IU]/ML
HCV RNA SERPL NAA+PROBE-LOG IU: NORMAL {LOG_IU}/ML
HGB BLD-MCNC: 8.8 G/DL (ref 13.5–17.5)
IMM GRANULOCYTES # BLD AUTO: 0.05 X10*3/UL (ref 0–0.7)
IMM GRANULOCYTES NFR BLD AUTO: 0.6 % (ref 0–0.9)
IRON SATN MFR SERPL: 13 % (ref 25–45)
IRON SERPL-MCNC: 23 UG/DL (ref 35–150)
LYMPHOCYTES # BLD AUTO: 0.12 X10*3/UL (ref 1.2–4.8)
LYMPHOCYTES NFR BLD AUTO: 1.3 %
MAGNESIUM SERPL-MCNC: 1.71 MG/DL (ref 1.6–2.4)
MCH RBC QN AUTO: 29.6 PG (ref 26–34)
MCHC RBC AUTO-ENTMCNC: 32 G/DL (ref 32–36)
MCV RBC AUTO: 93 FL (ref 80–100)
MONOCYTES # BLD AUTO: 0.29 X10*3/UL (ref 0.1–1)
MONOCYTES NFR BLD AUTO: 3.2 %
NEUTROPHILS # BLD AUTO: 8.49 X10*3/UL (ref 1.2–7.7)
NEUTROPHILS NFR BLD AUTO: 94.8 %
NRBC BLD-RTO: 0 /100 WBCS (ref 0–0)
P AXIS: 78 DEGREES
P OFFSET: 197 MS
P ONSET: 149 MS
PHOSPHATE SERPL-MCNC: 2.7 MG/DL (ref 2.5–4.9)
PHOSPHATE SERPL-MCNC: 2.7 MG/DL (ref 2.5–4.9)
PHOSPHATE SERPL-MCNC: 5.7 MG/DL (ref 2.5–4.9)
PLATELET # BLD AUTO: 132 X10*3/UL (ref 150–450)
POTASSIUM SERPL-SCNC: 2.8 MMOL/L (ref 3.5–5.3)
POTASSIUM SERPL-SCNC: 3.4 MMOL/L (ref 3.5–5.3)
POTASSIUM SERPL-SCNC: 3.6 MMOL/L (ref 3.5–5.3)
PR INTERVAL: 136 MS
Q ONSET: 217 MS
QRS COUNT: 13 BEATS
QRS DURATION: 94 MS
QT INTERVAL: 382 MS
QTC CALCULATION(BAZETT): 440 MS
QTC FREDERICIA: 420 MS
R AXIS: 95 DEGREES
RBC # BLD AUTO: 2.97 X10*6/UL (ref 4.5–5.9)
SODIUM SERPL-SCNC: 140 MMOL/L (ref 136–145)
SODIUM SERPL-SCNC: 141 MMOL/L (ref 136–145)
SODIUM SERPL-SCNC: 144 MMOL/L (ref 136–145)
T AXIS: 81 DEGREES
T OFFSET: 408 MS
TIBC SERPL-MCNC: 177 UG/DL (ref 240–445)
UIBC SERPL-MCNC: 154 UG/DL (ref 110–370)
VENTRICULAR RATE: 80 BPM
WBC # BLD AUTO: 9 X10*3/UL (ref 4.4–11.3)

## 2024-04-26 PROCEDURE — 2500000006 HC RX 250 W HCPCS SELF ADMINISTERED DRUGS (ALT 637 FOR ALL PAYERS)

## 2024-04-26 PROCEDURE — 36415 COLL VENOUS BLD VENIPUNCTURE: CPT

## 2024-04-26 PROCEDURE — 2500000005 HC RX 250 GENERAL PHARMACY W/O HCPCS: Performed by: STUDENT IN AN ORGANIZED HEALTH CARE EDUCATION/TRAINING PROGRAM

## 2024-04-26 PROCEDURE — 85025 COMPLETE CBC W/AUTO DIFF WBC: CPT

## 2024-04-26 PROCEDURE — 83540 ASSAY OF IRON: CPT

## 2024-04-26 PROCEDURE — 2500000001 HC RX 250 WO HCPCS SELF ADMINISTERED DRUGS (ALT 637 FOR MEDICARE OP)

## 2024-04-26 PROCEDURE — 99233 SBSQ HOSP IP/OBS HIGH 50: CPT | Performed by: HOSPITALIST

## 2024-04-26 PROCEDURE — 2500000004 HC RX 250 GENERAL PHARMACY W/ HCPCS (ALT 636 FOR OP/ED)

## 2024-04-26 PROCEDURE — 80069 RENAL FUNCTION PANEL: CPT

## 2024-04-26 PROCEDURE — 1200000002 HC GENERAL ROOM WITH TELEMETRY DAILY

## 2024-04-26 PROCEDURE — 2500000005 HC RX 250 GENERAL PHARMACY W/O HCPCS

## 2024-04-26 PROCEDURE — 2500000004 HC RX 250 GENERAL PHARMACY W/ HCPCS (ALT 636 FOR OP/ED): Performed by: STUDENT IN AN ORGANIZED HEALTH CARE EDUCATION/TRAINING PROGRAM

## 2024-04-26 PROCEDURE — 97161 PT EVAL LOW COMPLEX 20 MIN: CPT | Mod: GP

## 2024-04-26 PROCEDURE — 80069 RENAL FUNCTION PANEL: CPT | Performed by: STUDENT IN AN ORGANIZED HEALTH CARE EDUCATION/TRAINING PROGRAM

## 2024-04-26 PROCEDURE — 99232 SBSQ HOSP IP/OBS MODERATE 35: CPT | Performed by: TRANSPLANT SURGERY

## 2024-04-26 PROCEDURE — 2500000004 HC RX 250 GENERAL PHARMACY W/ HCPCS (ALT 636 FOR OP/ED): Mod: JZ

## 2024-04-26 PROCEDURE — 83735 ASSAY OF MAGNESIUM: CPT

## 2024-04-26 PROCEDURE — 84100 ASSAY OF PHOSPHORUS: CPT | Performed by: STUDENT IN AN ORGANIZED HEALTH CARE EDUCATION/TRAINING PROGRAM

## 2024-04-26 PROCEDURE — 97165 OT EVAL LOW COMPLEX 30 MIN: CPT | Mod: GO

## 2024-04-26 RX ORDER — SODIUM BICARBONATE 650 MG/1
650 TABLET ORAL ONCE
Status: DISCONTINUED | OUTPATIENT
Start: 2024-04-26 | End: 2024-04-26

## 2024-04-26 RX ORDER — SODIUM CHLORIDE, SODIUM LACTATE, POTASSIUM CHLORIDE, CALCIUM CHLORIDE 600; 310; 30; 20 MG/100ML; MG/100ML; MG/100ML; MG/100ML
1000 INJECTION, SOLUTION INTRAVENOUS CONTINUOUS
Status: DISCONTINUED | OUTPATIENT
Start: 2024-04-26 | End: 2024-04-26

## 2024-04-26 RX ORDER — SODIUM BICARBONATE 1 MEQ/ML
50 SYRINGE (ML) INTRAVENOUS ONCE
Status: DISCONTINUED | OUTPATIENT
Start: 2024-04-26 | End: 2024-04-26

## 2024-04-26 RX ORDER — DIPHENHYDRAMINE HCL 25 MG
25 CAPSULE ORAL ONCE
Status: COMPLETED | OUTPATIENT
Start: 2024-04-26 | End: 2024-04-26

## 2024-04-26 RX ORDER — SODIUM BICARBONATE 650 MG/1
650 TABLET ORAL ONCE
Status: COMPLETED | OUTPATIENT
Start: 2024-04-26 | End: 2024-04-26

## 2024-04-26 RX ORDER — ACYCLOVIR 400 MG/1
400 TABLET ORAL EVERY 12 HOURS SCHEDULED
Status: DISCONTINUED | OUTPATIENT
Start: 2024-04-26 | End: 2024-04-29 | Stop reason: HOSPADM

## 2024-04-26 RX ORDER — SULFAMETHOXAZOLE AND TRIMETHOPRIM 400; 80 MG/1; MG/1
80 TABLET ORAL DAILY
Status: DISCONTINUED | OUTPATIENT
Start: 2024-04-26 | End: 2024-04-29 | Stop reason: HOSPADM

## 2024-04-26 RX ORDER — HEPARIN SODIUM 5000 [USP'U]/ML
5000 INJECTION, SOLUTION INTRAVENOUS; SUBCUTANEOUS EVERY 8 HOURS
Status: DISCONTINUED | OUTPATIENT
Start: 2024-04-26 | End: 2024-04-29 | Stop reason: HOSPADM

## 2024-04-26 RX ORDER — PANTOPRAZOLE SODIUM 40 MG/1
40 TABLET, DELAYED RELEASE ORAL
Status: DISCONTINUED | OUTPATIENT
Start: 2024-04-27 | End: 2024-04-27

## 2024-04-26 RX ORDER — SODIUM BICARBONATE 1 MEQ/ML
50 SYRINGE (ML) INTRAVENOUS ONCE
Status: COMPLETED | OUTPATIENT
Start: 2024-04-26 | End: 2024-04-26

## 2024-04-26 RX ORDER — SULFAMETHOXAZOLE AND TRIMETHOPRIM 200; 40 MG/5ML; MG/5ML
80 SUSPENSION ORAL EVERY 12 HOURS SCHEDULED
Status: DISCONTINUED | OUTPATIENT
Start: 2024-04-26 | End: 2024-04-26

## 2024-04-26 RX ORDER — POTASSIUM CHLORIDE 20 MEQ/1
40 TABLET, EXTENDED RELEASE ORAL ONCE
Status: COMPLETED | OUTPATIENT
Start: 2024-04-26 | End: 2024-04-26

## 2024-04-26 RX ORDER — CLOTRIMAZOLE 10 MG/1
10 LOZENGE ORAL; TOPICAL
Status: DISCONTINUED | OUTPATIENT
Start: 2024-04-26 | End: 2024-04-29 | Stop reason: HOSPADM

## 2024-04-26 RX ORDER — SODIUM BICARBONATE 650 MG/1
1300 TABLET ORAL 2 TIMES DAILY
Status: DISCONTINUED | OUTPATIENT
Start: 2024-04-26 | End: 2024-04-29

## 2024-04-26 RX ORDER — SODIUM CHLORIDE, SODIUM LACTATE, POTASSIUM CHLORIDE, CALCIUM CHLORIDE 600; 310; 30; 20 MG/100ML; MG/100ML; MG/100ML; MG/100ML
75 INJECTION, SOLUTION INTRAVENOUS CONTINUOUS
Status: DISCONTINUED | OUTPATIENT
Start: 2024-04-26 | End: 2024-04-28

## 2024-04-26 RX ADMIN — CALCIUM GLUCONATE 3 G: 98 INJECTION, SOLUTION INTRAVENOUS at 22:48

## 2024-04-26 RX ADMIN — OXYCODONE HYDROCHLORIDE 5 MG: 5 TABLET ORAL at 09:33

## 2024-04-26 RX ADMIN — MYCOPHENOLATE MOFETIL 1000 MG: 250 CAPSULE ORAL at 06:27

## 2024-04-26 RX ADMIN — CEFAZOLIN SODIUM 2 G: 2 INJECTION, SOLUTION INTRAVENOUS at 04:22

## 2024-04-26 RX ADMIN — HEPARIN SODIUM 5000 UNITS: 5000 INJECTION INTRAVENOUS; SUBCUTANEOUS at 11:10

## 2024-04-26 RX ADMIN — SODIUM BICARBONATE 650 MG: 650 TABLET ORAL at 09:12

## 2024-04-26 RX ADMIN — SENNOSIDES 8.6 MG: 8.6 TABLET, FILM COATED ORAL at 09:12

## 2024-04-26 RX ADMIN — SODIUM CHLORIDE, POTASSIUM CHLORIDE, SODIUM LACTATE AND CALCIUM CHLORIDE 75 ML/HR: 600; 310; 30; 20 INJECTION, SOLUTION INTRAVENOUS at 16:45

## 2024-04-26 RX ADMIN — CLOTRIMAZOLE 10 MG: 10 LOZENGE ORAL; TOPICAL at 18:20

## 2024-04-26 RX ADMIN — CEFAZOLIN SODIUM 2 G: 2 INJECTION, SOLUTION INTRAVENOUS at 12:27

## 2024-04-26 RX ADMIN — OXYCODONE HYDROCHLORIDE 5 MG: 5 TABLET ORAL at 05:38

## 2024-04-26 RX ADMIN — ACETAMINOPHEN 650 MG: 325 TABLET ORAL at 12:27

## 2024-04-26 RX ADMIN — DIPHENHYDRAMINE HYDROCHLORIDE 25 MG: 25 CAPSULE ORAL at 12:27

## 2024-04-26 RX ADMIN — HEPARIN SODIUM 5000 UNITS: 5000 INJECTION INTRAVENOUS; SUBCUTANEOUS at 20:55

## 2024-04-26 RX ADMIN — CLOTRIMAZOLE 10 MG: 10 LOZENGE ORAL; TOPICAL at 12:27

## 2024-04-26 RX ADMIN — SODIUM BICARBONATE 1300 MG: 650 TABLET ORAL at 20:54

## 2024-04-26 RX ADMIN — ACETAMINOPHEN 650 MG: 325 TABLET ORAL at 05:03

## 2024-04-26 RX ADMIN — ACYCLOVIR 400 MG: 400 TABLET ORAL at 11:10

## 2024-04-26 RX ADMIN — SODIUM BICARBONATE 50 MEQ: 84 INJECTION INTRAVENOUS at 17:43

## 2024-04-26 RX ADMIN — SULFAMETHOXAZOLE AND TRIMETHOPRIM 80 MG: 400; 80 TABLET ORAL at 12:27

## 2024-04-26 RX ADMIN — TACROLIMUS 1 MG: 1 CAPSULE ORAL at 06:27

## 2024-04-26 RX ADMIN — POTASSIUM CHLORIDE 40 MEQ: 1500 TABLET, EXTENDED RELEASE ORAL at 22:48

## 2024-04-26 RX ADMIN — ACETAMINOPHEN 650 MG: 325 TABLET ORAL at 18:18

## 2024-04-26 RX ADMIN — HEPARIN SODIUM 75 MG: 1000 INJECTION INTRAVENOUS; SUBCUTANEOUS at 13:01

## 2024-04-26 RX ADMIN — ACYCLOVIR 400 MG: 400 TABLET ORAL at 20:54

## 2024-04-26 RX ADMIN — SENNOSIDES 8.6 MG: 8.6 TABLET, FILM COATED ORAL at 20:54

## 2024-04-26 RX ADMIN — TACROLIMUS 1 MG: 1 CAPSULE ORAL at 18:18

## 2024-04-26 RX ADMIN — MYCOPHENOLATE MOFETIL 1000 MG: 250 CAPSULE ORAL at 18:18

## 2024-04-26 RX ADMIN — DEXTROSE MONOHYDRATE 250 MG: 50 INJECTION, SOLUTION INTRAVENOUS at 09:19

## 2024-04-26 RX ADMIN — OXYCODONE HYDROCHLORIDE 5 MG: 5 TABLET ORAL at 15:31

## 2024-04-26 RX ADMIN — SODIUM CHLORIDE, POTASSIUM CHLORIDE, SODIUM LACTATE AND CALCIUM CHLORIDE 1000 ML/HR: 600; 310; 30; 20 INJECTION, SOLUTION INTRAVENOUS at 09:12

## 2024-04-26 ASSESSMENT — COGNITIVE AND FUNCTIONAL STATUS - GENERAL
DAILY ACTIVITIY SCORE: 20
CLIMB 3 TO 5 STEPS WITH RAILING: A LITTLE
TOILETING: A LITTLE
HELP NEEDED FOR BATHING: A LITTLE
MOBILITY SCORE: 23
CLIMB 3 TO 5 STEPS WITH RAILING: A LITTLE
TOILETING: A LITTLE
STANDING UP FROM CHAIR USING ARMS: A LITTLE
DRESSING REGULAR LOWER BODY CLOTHING: A LITTLE
MOVING FROM LYING ON BACK TO SITTING ON SIDE OF FLAT BED WITH BEDRAILS: A LITTLE
CLIMB 3 TO 5 STEPS WITH RAILING: A LITTLE
DRESSING REGULAR UPPER BODY CLOTHING: A LITTLE
HELP NEEDED FOR BATHING: A LITTLE
TURNING FROM BACK TO SIDE WHILE IN FLAT BAD: A LITTLE
CLIMB 3 TO 5 STEPS WITH RAILING: A LITTLE
MOVING TO AND FROM BED TO CHAIR: A LITTLE
MOBILITY SCORE: 21
CLIMB 3 TO 5 STEPS WITH RAILING: A LITTLE
WALKING IN HOSPITAL ROOM: A LITTLE
DAILY ACTIVITIY SCORE: 24
DAILY ACTIVITIY SCORE: 20
DRESSING REGULAR UPPER BODY CLOTHING: A LITTLE
DRESSING REGULAR LOWER BODY CLOTHING: A LITTLE
STANDING UP FROM CHAIR USING ARMS: A LITTLE
WALKING IN HOSPITAL ROOM: A LITTLE
CLIMB 3 TO 5 STEPS WITH RAILING: A LITTLE
CLIMB 3 TO 5 STEPS WITH RAILING: A LITTLE
MOBILITY SCORE: 18

## 2024-04-26 ASSESSMENT — PAIN SCALES - GENERAL
PAINLEVEL_OUTOF10: 5 - MODERATE PAIN
PAINLEVEL_OUTOF10: 2
PAINLEVEL_OUTOF10: 3
PAINLEVEL_OUTOF10: 3
PAINLEVEL_OUTOF10: 2
PAINLEVEL_OUTOF10: 6
PAINLEVEL_OUTOF10: 4

## 2024-04-26 ASSESSMENT — PAIN DESCRIPTION - DESCRIPTORS
DESCRIPTORS: DISCOMFORT
DESCRIPTORS: SORE
DESCRIPTORS: SORE

## 2024-04-26 ASSESSMENT — PAIN DESCRIPTION - ORIENTATION
ORIENTATION: RIGHT

## 2024-04-26 ASSESSMENT — PAIN - FUNCTIONAL ASSESSMENT
PAIN_FUNCTIONAL_ASSESSMENT: 0-10

## 2024-04-26 ASSESSMENT — PAIN SCALES - PAIN ASSESSMENT IN ADVANCED DEMENTIA (PAINAD)
BREATHING: NORMAL
BREATHING: NORMAL

## 2024-04-26 ASSESSMENT — PAIN DESCRIPTION - LOCATION
LOCATION: ABDOMEN

## 2024-04-26 ASSESSMENT — ACTIVITIES OF DAILY LIVING (ADL)
ADL_ASSISTANCE: INDEPENDENT
BATHING_ASSISTANCE: MINIMAL

## 2024-04-26 NOTE — PROGRESS NOTES
Physical Therapy    Physical Therapy Evaluation    Patient Name: Trever Ellison  MRN: [id number]  Today's Date: 4/26/2024   Time Calculation  Start Time: 0854  Stop Time: 0920  Time Calculation (min): 26 min    Assessment/Plan   PT Assessment  PT Assessment Results: Decreased mobility, Pain  Rehab Prognosis: Excellent  End of Session Communication: Bedside nurse  End of Session Patient Position: Up in chair, Alarm off, not on at start of session  IP OR SWING BED PT PLAN  Inpatient or Swing Bed: Inpatient  PT Plan  Treatment/Interventions: Bed mobility, Gait training, Transfer training, Stair training, Strengthening  PT Plan: Skilled PT  PT Frequency: 3 times per week  PT Discharge Recommendations: No PT needed after discharge  PT Recommended Transfer Status: Assist x1  PT - OK to Discharge: Yes      Subjective   General Visit Information:  Reason for Referral: s/p living donor kidney transplant  Past Medical History Relevant to Rehab: ESRD  Prior to Session Communication: Bedside nurse  Patient Position Received: Bed, 3 rail up, Alarm off, not on at start of session  Family/Caregiver Present: Yes   Home Living:  Home Living  Type of Home: House  Lives With: Parent(s)  Home Adaptive Equipment: None  Home Layout: Two level  Home Access: Stairs to enter with rails  Entrance Stairs-Number of Steps: 1  Prior Level of Function:  Prior Function Per Pt/Caregiver Report  ADL Assistance: Independent  Homemaking Assistance: Independent  Ambulatory Assistance: Independent  Vocational:  (attends online school)  Precautions:  Precautions  Medical Precautions: Abdominal precautions, Fall precautions       Objective     Pain:  Pain Assessment  Pain Assessment: 0-10  Pain Score: 3  Pain Location: Abdomen  Cognition:  Cognition  Overall Cognitive Status: Within Functional Limits      Extremity/Trunk Assessments:  Strength:     RLE   RLE : Within Functional Limits  LLE   LLE : Within Functional Limits    General Assessments:      Sensation  Light Touch:  (numbness right thigh)     Static Sitting Balance  Static Sitting-Level of Assistance: Independent  Dynamic Sitting Balance  Dynamic Sitting-Comments: independent  Static Standing Balance  Static Standing-Level of Assistance: Close supervision  Dynamic Standing Balance  Dynamic Standing-Comments: SBA/CGA    Functional Assessments:  Bed Mobility  Bed Mobility: Yes  Bed Mobility 1  Bed Mobility 1: Supine to sitting  Level of Assistance 1: Contact guard  Bed Mobility Comments 1: verbal cues for logroll, use of bed rails, HOB elevated  Transfers  Transfer: Yes  Transfer 1  Technique 1: Sit to stand, Stand to sit  Transfer Device 1: Walker  Transfer Level of Assistance 1: Contact guard  Trials/Comments 1: verbal cues for hand placement  Ambulation/Gait Training  Ambulation/Gait Training Performed: Yes  Ambulation/Gait Training 1  Device 1: Rolling walker  Assistance 1: Contact guard  Quality of Gait 1: Decreased step length  Comments/Distance (ft) 1: 3 feet to chair     Outcome Measures:  [facility] Basic Mobility  Turning from your back to your side while in a flat bed without using bedrails: A little  Moving from lying on your back to sitting on the side of a flat bed without using bedrails: A little  Moving to and from bed to chair (including a wheelchair): A little  Standing up from a chair using your arms (e.g. wheelchair or bedside chair): A little  To walk in hospital room: A little  Climbing 3-5 steps with railing: A little  Basic Mobility - Total Score: 18       Encounter Problems       Encounter Problems (Active)       Mobility       STG - Patient will ambulate 200 feet independent       Start:  04/26/24    Expected End:  05/17/24            STG - Patient will ascend and descend a flight of stairs independent        Start:  04/26/24    Expected End:  05/17/24               PT Transfers       STG - Patient will perform bed mobility independent        Start:  04/26/24    Expected End:   05/17/24            STG - Patient will transfer sit to and from stand independent        Start:  04/26/24    Expected End:  05/17/24                   Education Documentation  Precautions, taught by Tanya Mars PT at 4/26/2024 11:10 AM.  Learner: Patient  Readiness: Acceptance  Method: Explanation  Response: Verbalizes Understanding    Body Mechanics, taught by Tanya Mars PT at 4/26/2024 11:10 AM.  Learner: Patient  Readiness: Acceptance  Method: Explanation  Response: Verbalizes Understanding    Mobility Training, taught by Tanya Mars PT at 4/26/2024 11:10 AM.  Learner: Patient  Readiness: Acceptance  Method: Explanation  Response: Verbalizes Understanding    Education Comments  No comments found.            04/26/24 at 11:15 AM   Tanya Mars PT

## 2024-04-26 NOTE — PROGRESS NOTES
Education Documentation  Nutrition Care Manual, taught by Isaura Marie RDN, LD at 4/26/2024 10:57 AM.  Learner: Family, Patient  Readiness: Eager  Method: Explanation, Handout  Response: Verbalizes Understanding  Comment: RDN met with pt and pt's mother. Both are very diligent about food safety at baseline from pt being on dialysis. Reviewed food safet post transplant booklet. Pt with chickens at home-- discussed handling of raw eggs. All questions answered by RDN.      Time Spent/Follow-up Reminder:   Time Spent (min): 30 minutes  Last Date of Nutrition Visit: 04/26/24  Nutrition Follow-Up Needed?: None  Follow up Comment: Education completed 4/26

## 2024-04-26 NOTE — PROGRESS NOTES
"Transplant Nephrology progress note     Date of admission: 4/25/2024     Trever Ellison is a 19 y.o.  with [facility]   Past Medical History:   Diagnosis Date    Acute Crohn's disease (Multi)     colitis    Aluminum bone disease     Anemia     Angina pectoris (CMS-HCC)     Cachexia (Multi)     End stage chronic kidney disease (Multi)     Dialysis    Hemodialysis patient (CMS-HCC)     Hydronephrosis     1 yo    Nephritis     Tachycardia     Vitamin D deficiency         SUBJECTIVE:      Current bedside.  Maintaining good urine output.    PROBLEM LIST:  Principal Problem:    ESRD (end stage renal disease) (Multi)  Active Problems:    Kidney replaced by transplant ([facility])         ALLERGIES:  Allergies   Allergen Reactions    Cephalexin Rash    Methotrexate Headache, Other and Unknown     \"Did not tolerate well\", per mom. \" He would get sick, it caused nausea and headaches.\"    Adhesive Tape-Silicones Rash     Redness, raw skin            CURRENT MEDICATIONS:  Scheduled medications  acetaminophen, 650 mg, oral, q6h ROBERTO  acyclovir, 400 mg, oral, q12h ROBERTO  antithymocyte globulin (rabbit), 1.5 mg/kg, intravenous, Once  clotrimazole, 10 mg, oral, TID with meals  [START ON 4/27/2024] darbepoetin rod, 40 mcg, subcutaneous, q14 days  heparin (porcine), 5,000 Units, subcutaneous, q8h  [START ON 4/27/2024] methylPREDNISolone sodium succinate (PF), 125 mg, intravenous, Once  [START ON 4/28/2024] methylPREDNISolone sodium succinate (PF), 60 mg, intravenous, Once  mycophenolate, 1,000 mg, oral, q12h  [START ON 4/27/2024] pantoprazole, 40 mg, oral, Daily before breakfast  [START ON 4/29/2024] predniSONE, 20 mg, oral, Daily  sennosides, 1 tablet, oral, BID  sulfamethoxazole-trimethoprim, 80 mg, oral, Daily  tacrolimus, 1 mg, oral, q12h      Continuous medications  lactated Ringer's, 1,000 mL/hr, Last Rate: 75 mL/hr (04/26/24 1301)  sodium chloride, 60 mL/hr, Last Rate: 60 mL/hr (04/26/24 0319)      PRN medications  PRN medications: [START " "ON 4/27/2024] acetaminophen, naloxone, ondansetron ODT **OR** ondansetron, oxyCODONE, oxygen, polyethylene glycol       OBJECTIVE:    VITALS: Visit Vitals  /81 (BP Location: Left arm, Patient Position: Lying)   Pulse 100   Temp 36 °C (96.8 °F) (Temporal)   Resp 18   Ht 1.676 m (5' 5.98\")   Wt 48.8 kg (107 lb 8 oz)   SpO2 94%   BMI 17.36 kg/m²   Smoking Status Never   BSA 1.51 m²        General: No distress   Mucosa moist   AI, AC, AF     HEENT: PEERLA  CVS: S1 S2 no murmurs  RESP:  Lungs clear to auscultation   ABDO: Soft surgical site look clean with no drainage  Neuro: A + O x 3  Skin: No rash   Extremities: No edema       LABS:  Results from last 72 hours   Lab Units 04/26/24  0624   WBC AUTO x10*3/uL 9.0   HEMOGLOBIN g/dL 8.8*   MCV fL 93   PLATELETS AUTO x10*3/uL 132*   BUN mg/dL 42*   CREATININE mg/dL 3.13*   CALCIUM mg/dL 7.0*            Intake/Output Summary (Last 24 hours) at 4/26/2024 1351  Last data filed at 4/26/2024 1301  Gross per 24 hour   Intake 6016 ml   Output 5785 ml   Net 231 ml          ASSESSMENT AND PLAN:    Trever Ellison is a 19 y.o. with a history of congenital hydronephrosis and end-stage renal disease secondary to chronic Remicade use secondary to chron's disease.  Currently admitted for living related kidney transplant done on 4/25/2024.    Kidney info: Living related transplant, PRA 0%, HCV D-/are minus, CMV D-/R-, EBV D+/R-, HBV D-/R-.    Graft function: S/p a living transplant on 4/25/2024  -Spontaneous graft function with a downtrending creatinine, stable electrolytes.  -Unremarkable ultrasound.  -Continue monitoring I's and O's and kidney function    Immunosuppression:  -Thymoglobulin induction with 4.5 mg/kg day 2 today  -Will be maintained on tacrolimus plus MMF plus prednisone    Hemodynamics:  -Blood pressures well-controlled.    Infectious prophylaxis:  -Acyclovir, Bactrim, clotrimazole    Bone mineral disease  -Calcium and phosphorus levels are " optimal    Anemia/leukopenia  -Starting Aranesp 40 mcg every other week  -Will get iron studies monitor Hgb.        Thank you for consulting .  Santa Singleton MD       Notes created by Lawrence -Please excuse the Typos .

## 2024-04-26 NOTE — PROGRESS NOTES
"Trevre Ellison is a 19 y.o. male on day 1 of admission presenting with ESRD (end stage renal disease) (Multi).    Subjective   Doing well after kidney transplant       Objective   Vitals:    04/26/24 1300   BP: 115/81   Pulse: 100   Resp: 18   Temp: 36 °C (96.8 °F)   SpO2: 94%       Physical Exam  Constitutional:       Appearance: Normal appearance.   Eyes:      Pupils: Pupils are equal, round, and reactive to light.   Cardiovascular:      Rate and Rhythm: Normal rate.   Pulmonary:      Effort: Pulmonary effort is normal. No respiratory distress.   Abdominal:      General: There is no distension.      Palpations: Abdomen is soft.      Comments: Incision clean, dry, intact   Musculoskeletal:         General: Normal range of motion.      Right lower leg: No edema.      Left lower leg: No edema.   Skin:     General: Skin is warm and dry.   Neurological:      General: No focal deficit present.      Mental Status: He is alert and oriented to person, place, and time.   Psychiatric:         Mood and Affect: Mood normal.         Behavior: Behavior normal.         Last Recorded Vitals  Blood pressure 115/81, pulse 100, temperature 36 °C (96.8 °F), temperature source Temporal, resp. rate 18, height 1.676 m (5' 5.98\"), weight 48.8 kg (107 lb 8 oz), SpO2 94%.  Intake/Output last 3 Shifts:  I/O last 3 completed shifts:  In: 6519 (133.7 mL/kg) [I.V.:5634 (115.5 mL/kg); IV Piggyback:885]  Out: 5450 (111.8 mL/kg) [Urine:5350 (3 mL/kg/hr); Blood:100]  Weight: 48.8 kg     Relevant Results  Lab Results   Component Value Date    WBC 9.0 04/26/2024    HGB 8.8 (L) 04/26/2024    HCT 27.5 (L) 04/26/2024    MCV 93 04/26/2024     (L) 04/26/2024     Lab Results   Component Value Date    GLUCOSE 94 04/26/2024    CALCIUM 7.0 (L) 04/26/2024     04/26/2024    K 3.4 (L) 04/26/2024    CO2 17 (L) 04/26/2024     (H) 04/26/2024    BUN 42 (H) 04/26/2024    CREATININE 3.13 (H) 04/26/2024     acetaminophen, 650 mg, oral, q6h " ROBERTO  acyclovir, 400 mg, oral, q12h ROBERTO  antithymocyte globulin (rabbit), 1.5 mg/kg, intravenous, Once  clotrimazole, 10 mg, oral, TID with meals  [START ON 4/27/2024] darbepoetin rod, 40 mcg, subcutaneous, q14 days  heparin (porcine), 5,000 Units, subcutaneous, q8h  [START ON 4/27/2024] methylPREDNISolone sodium succinate (PF), 125 mg, intravenous, Once  [START ON 4/28/2024] methylPREDNISolone sodium succinate (PF), 60 mg, intravenous, Once  mycophenolate, 1,000 mg, oral, q12h  [START ON 4/27/2024] pantoprazole, 40 mg, oral, Daily before breakfast  [START ON 4/29/2024] predniSONE, 20 mg, oral, Daily  sennosides, 1 tablet, oral, BID  sulfamethoxazole-trimethoprim, 80 mg, oral, Daily  tacrolimus, 1 mg, oral, q12h          Assessment/Plan   Principal Problem:    ESRD (end stage renal disease) (Multi)  Active Problems:    Kidney replaced by transplant ([facility])    LRKT from father 4/25/2024    Kidney allograft function  Excellent, down trending creatinine    Immunosuppression   Thymo planning 4.5mg/kg   Tac 1/1   MMF 1000/1000   Pred taper    Crohn's disease   Re-assess for needs of additional biologic agents at a later time    Dispo Planning Monday   Pathway      I spent 35 minutes in the professional and overall care of this patient.      Olman Williamson MD

## 2024-04-26 NOTE — PROGRESS NOTES
"Mr. Ellison received a kidney transplant on 4/25/2024. SW reviewed the pre transplant Social Work evaluation as well as interdisciplinary notes of this admission. Attended transplant interdisciplinary rounds. SW reviewed, participated and is in agreement with MDT Plan of Care.     Functional/Medical Status: SW met with Pt and Pt's mother, Ginger, at the bedside. Pt was asleep in the bed during visit, and SW spoke with Pt's mother. Pt's mother reported Pt has not been able to eat or drink without vomiting. Pt's mother shared Pt has been able to sit up in a chair for several hours.  Dialysis start date: May 2023  Adaption to the Stress of Transplant: Pt's mother denied any concerns related to transplant at this time.   Mental Health/Substance use: Pt's mother reported Pt's mood has been \"pretty good.\" Pt's mother shared Pt has been frustrated due to pain recently. Pt's mother requested SW bring up MH resources at next visit, and SW agreed. Pt's mother denied any recent tobacco, alcohol, or illicit drug use for Pt.  Post Discharge Supports/Recovery Plan: Pt's mother reported she will be Pt's primary support and Pt's brother, Tapan, will be Pt's secondary support. Pt's mother shared Tapan flew into town and is staying for 2 weeks to help care for Pt.  Benefits: [employer]    Impressions: SW met with Pt and Pt's mother, Ginger, at the bedside. Pt was asleep in the bed during visit, and SW spoke with Pt's mother. Pt's mother reported Pt has not been able to eat or drink without vomiting. Pt's mother shared Pt has been able to sit up in a chair for several hours. Pt's mother denied any concerns related to transplant at this time. Pt's mother reported Pt's mood has been \"pretty good.\" Pt's mother shared Pt has been frustrated due to pain recently. Pt's mother requested SW bring up MH resources at next visit, and SW agreed. Pt's mother denied any recent tobacco, alcohol, or illicit drug use for Pt. Pt's mother reported she will be " Pt's primary support and Pt's brother, Tapan, will be Pt's secondary support. Pt's mother shared Tapan flew into town and is staying for 2 weeks to help care for Pt. Patient and family/support system are in agreement and report understanding of their treatment plan. They were provided an opportunity to ask questions, though denied any issues or concerns at this time.        PLAN:  We reviewed the importance of having lab work done twice a week or as directed; scheduled post-transplant appointments; reporting alarming symptoms; restrictions of activities and importance of adherence to medical regimen. We also discussed the precautions to take due to being immunosuppressed. Patient indicated understanding of this information along with the discharge plan and instructions. Patient was given the opportunity to discuss any issues. Patient was given social work contact information.

## 2024-04-26 NOTE — PROGRESS NOTES
Occupational Therapy    Evaluation    Patient Name: Trever Ellison  MRN: [id number]  Today's Date: 4/26/2024  Time Calculation  Start Time: 1418  Stop Time: 1431  Time Calculation (min): 13 min    Assessment  IP OT Assessment  OT Assessment: Patient motivated to return to PLOF. Pt. receptive to post op precaution education and importance of OOB activity to decrease risk of hospital acquired weakness. Recommend no OT services when medically appropriate for discharge; continue acute care OT.  Prognosis: Excellent  Barriers to Discharge: None  Evaluation/Treatment Tolerance: Patient tolerated treatment well  End of Session Communication: Bedside nurse  End of Session Patient Position: Alarm off, not on at start of session, Bed, 3 rail up  Plan:  Treatment Interventions: ADL retraining, Visual perceptual retraining, Functional transfer training, UE strengthening/ROM, Cognitive reorientation, Patient/family training, Equipment evaluation/education, Endurance training, Neuromuscular reeducation, Fine motor coordination activities, Compensatory technique education, UE splinting  OT Frequency: 2 times per week  OT Discharge Recommendations: No OT needed after discharge  OT Recommended Transfer Status: Assist of 1  OT - OK to Discharge: Yes (when medically appropriate)    Subjective   Current Problem:  1. ESRD (end stage renal disease) (Multi)        2. Kidney transplant recipient ([facility])          General:  General  Reason for Referral: 20 y/o M s/p living donor kidney transplant  Past Medical History Relevant to Rehab: ESRD, ongenital hydronephrosis and end-stage renal disease secondary to chronic Remicade use secondary to Crohn's disease  Family/Caregiver Present: No  Prior to Session Communication: Bedside nurse  Patient Position Received: Bed, 3 rail up, Alarm off, not on at start of session  Preferred Learning Style: auditory, verbal, visual  General Comment: Patient just woke up but agreeable to OT eval.  Requesting to  get OOB later this date; communicated this to RN.  Precautions:  Hearing/Visual Limitations: appear WFL  Medical Precautions: Abdominal precautions, Fall precautions  Post-Surgical Precautions: Abdominal surgery precautions     Pain:  Pain Assessment  Pain Assessment: 0-10  Pain Score: 2  Pain Type: Surgical pain  Pain Location: Abdomen  Pain Orientation: Right  Pain Descriptors: Discomfort  Pain Frequency: Constant/continuous  Pain Onset: Ongoing  Pain Interventions: Repositioned, Distraction, Emotional support  Response to Interventions: tolerated OT    Objective   Cognition:  Overall Cognitive Status: Within Functional Limits  Orientation Level: Oriented X4  Cognition Comments: Reviewed post op precautions and application to ADL/IADL tasks.     Home Living:  Type of Home: House  Lives With: Parent(s)  Home Adaptive Equipment: None  Home Layout: Two level  Home Access: Stairs to enter with rails  Entrance Stairs-Number of Steps: 1   Prior Function:  Level of [place]: Independent with ADLs and functional transfers (Family helps with IADLs. Pt. does his own laundry.)  Vocational:  (Online schooling for architecture.)  Leisure: Swimming, video games.  Hand Dominance: Right     ADL:  Eating Assistance: Modified independent (Device)  Eating Deficit: Setup  Grooming Assistance: Modified independent (Device)  Grooming Deficit: Setup  Bathing Assistance: Minimal  UE Dressing Assistance: Stand by  UE Dressing Deficit: Supervision/safety  LE Dressing Assistance: Minimal  Toileting Assistance with Device: Minimal     Vision: Vision - Basic Assessment  Current Vision: No visual deficits       Strength:  Strength Comments: BUEs within post op precautions; at least 3+/5       Coordination:  Movements are Fluid and Coordinated: Yes   Hand Function:  Hand Function  Gross Grasp: Functional  Coordination: Functional  Extremities: RUE   RUE : Within Functional Limits and LUE   LUE: Within Functional Limits    Outcome Measures:  [facility] Daily Activity  Putting on and taking off regular lower body clothing: A little  Bathing (including washing, rinsing, drying): A little  Putting on and taking off regular upper body clothing: A little  Toileting, which includes using toilet, bedpan or urinal: A little  Taking care of personal grooming such as brushing teeth: None  Eating Meals: None  Daily Activity - Total Score: 20     OT Adult Other Outcome Measures  4AT: Negative  Education Documentation  Body Mechanics, taught by Jacey Arzola OT at 4/26/2024  3:23 PM.  Learner: Patient  Readiness: Acceptance  Method: Explanation  Response: Verbalizes Understanding    Precautions, taught by Jacey Arzola OT at 4/26/2024  3:23 PM.  Learner: Patient  Readiness: Acceptance  Method: Explanation  Response: Verbalizes Understanding    ADL Training, taught by Jacey Arzola OT at 4/26/2024  3:23 PM.  Learner: Patient  Readiness: Acceptance  Method: Explanation  Response: Verbalizes Understanding    Education Comments  No comments found.      Goals:   Encounter Problems       Encounter Problems (Active)       ADLs       Patient will complete LB dressing with MOD I.   (Progressing)       Start:  04/26/24    Expected End:  05/10/24            Patient will complete UB dressing with MOD I.   (Progressing)       Start:  04/26/24    Expected End:  05/10/24            Patient will complete toileting with MOD I.   (Progressing)       Start:  04/26/24    Expected End:  05/10/24               MOBILITY       Patient will complete bed mobility with MOD I.    (Progressing)       Start:  04/26/24    Expected End:  05/10/24            Pt. Will demo household distance functional mobility with MOD I using LRAD.   (Progressing)       Start:  04/26/24    Expected End:  05/10/24               TRANSFERS       Pt. Will complete stand pivot transfer with MOD I using LRAD.   (Progressing)       Start:  04/26/24    Expected End:  05/10/24                  Jacey Arzola  OT  04/26/2024

## 2024-04-26 NOTE — SIGNIFICANT EVENT
Rapid Response RN responding for RADAR score of 6 due to the following VS: T 36.0 °Celsius;  ; RR 18; /98; SPO2 90% .      Reviewed abnormal VS with bedside RN who expressed no concerns regarding the patient's current condition based upon these.  Nurse indicated that close monitoring of this post operative patient was being maintained through this night.  No interventions by Rapid Response team indicated at this time.

## 2024-04-26 NOTE — PROGRESS NOTES
Acute Pain Service    Postop Pain HPI -   Palliative: relieved with IV analgesics and regional local anesthetics  Provocative: movement  Quality:  burning and aching  Radiation:  none  Severity:  2/10  Timing: constant  - Reporting some R thigh numbness this AM    24-HOUR OPIOID CONSUMPTION:  Tylenol 650 x3, oxy 5 x2, gabapentin 300 x2, dilaudid 0.2 x3    Scheduled medications  acetaminophen, 650 mg, oral, q6h ROBERTO  acyclovir, 400 mg, oral, q12h ROBERTO  antithymocyte globulin (rabbit), 1.5 mg/kg, intravenous, Once  ceFAZolin, 2 g, intravenous, q8h  [START ON 4/27/2024] darbepoetin rod, 40 mcg, subcutaneous, q14 days  diphenhydrAMINE, 25 mg, oral, Once  heparin (porcine), 5,000 Units, subcutaneous, q8h  [START ON 4/27/2024] methylPREDNISolone sodium succinate (PF), 125 mg, intravenous, Once  [START ON 4/28/2024] methylPREDNISolone sodium succinate (PF), 60 mg, intravenous, Once  methylPREDNISolone sodium succinate (PF), 250 mg, intravenous, Once  mycophenolate, 1,000 mg, oral, q12h  [START ON 4/27/2024] pantoprazole, 40 mg, oral, Daily before breakfast  [START ON 4/29/2024] predniSONE, 20 mg, oral, Daily  sennosides, 1 tablet, oral, BID  sulfamethoxazole-trimethoprim, 80 mg of trimethoprim, oral, q12h ROBERTO  tacrolimus, 1 mg, oral, q12h      Continuous medications  lactated Ringer's, 1,000 mL/hr, Last Rate: 125 mL/hr (04/26/24 0913)  sodium chloride, 60 mL/hr, Last Rate: 60 mL/hr (04/26/24 0319)      PRN medications  PRN medications: [START ON 4/27/2024] acetaminophen, naloxone, ondansetron ODT **OR** ondansetron, oxyCODONE, oxygen, polyethylene glycol     Physical Exam:  Constitutional:  no distress, alert and cooperative  Eyes: clear sclera  Head/Neck: No apparent injury, trachea midline  Respiratory/Thorax: Patent airways, thorax symmetric, breathing comfortably  Cardiovascular: no pitting edema  Gastrointestinal: Nondistended  Musculoskeletal: ROM intact  Extremities: no clubbing  Neurological: alert, espino  x4  Psychological: Appropriate affect    Results for orders placed or performed during the hospital encounter of 04/25/24 (from the past 24 hour(s))   CBC and Auto Differential   Result Value Ref Range    WBC 4.6 4.4 - 11.3 x10*3/uL    nRBC 0.0 0.0 - 0.0 /100 WBCs    RBC 3.45 (L) 4.50 - 5.90 x10*6/uL    Hemoglobin 10.0 (L) 13.5 - 17.5 g/dL    Hematocrit 30.1 (L) 41.0 - 52.0 %    MCV 87 80 - 100 fL    MCH 29.0 26.0 - 34.0 pg    MCHC 33.2 32.0 - 36.0 g/dL    RDW 12.8 11.5 - 14.5 %    Platelets 138 (L) 150 - 450 x10*3/uL    Neutrophils % 96.6 40.0 - 80.0 %    Immature Granulocytes %, Automated 0.2 0.0 - 0.9 %    Lymphocytes % 2.4 13.0 - 44.0 %    Monocytes % 0.6 2.0 - 10.0 %    Eosinophils % 0.0 0.0 - 6.0 %    Basophils % 0.2 0.0 - 2.0 %    Neutrophils Absolute 4.48 1.20 - 7.70 x10*3/uL    Immature Granulocytes Absolute, Automated 0.01 0.00 - 0.70 x10*3/uL    Lymphocytes Absolute 0.11 (L) 1.20 - 4.80 x10*3/uL    Monocytes Absolute 0.03 (L) 0.10 - 1.00 x10*3/uL    Eosinophils Absolute 0.00 0.00 - 0.70 x10*3/uL    Basophils Absolute 0.01 0.00 - 0.10 x10*3/uL   Renal Function Panel   Result Value Ref Range    Glucose 103 (H) 74 - 99 mg/dL    Sodium 134 (L) 136 - 145 mmol/L    Potassium 3.5 3.5 - 5.3 mmol/L    Chloride 103 98 - 107 mmol/L    Bicarbonate 18 (L) 21 - 32 mmol/L    Anion Gap 17 10 - 20 mmol/L    Urea Nitrogen 52 (H) 6 - 23 mg/dL    Creatinine 5.12 (H) 0.50 - 1.30 mg/dL    eGFR 16 (L) >60 mL/min/1.73m*2    Calcium 7.0 (L) 8.6 - 10.6 mg/dL    Phosphorus 3.5 2.5 - 4.9 mg/dL    Albumin 3.8 3.4 - 5.0 g/dL   Blood Gas Venous Full Panel   Result Value Ref Range    POCT pH, Venous 7.19 (LL) 7.33 - 7.43 pH    POCT pCO2, Venous 52 (H) 41 - 51 mm Hg    POCT pO2, Venous 45 35 - 45 mm Hg    POCT SO2, Venous 72 45 - 75 %    POCT Oxy Hemoglobin, Venous 70.7 45.0 - 75.0 %    POCT Hematocrit Calculated, Venous 33.0 (L) 41.0 - 52.0 %    POCT Sodium, Venous 134 (L) 136 - 145 mmol/L    POCT Potassium, Venous 3.1 (L) 3.5 - 5.3  mmol/L    POCT Chloride, Venous 102 98 - 107 mmol/L    POCT Ionized Calicum, Venous 0.97 (L) 1.10 - 1.33 mmol/L    POCT Glucose, Venous 102 (H) 74 - 99 mg/dL    POCT Lactate, Venous 1.8 0.4 - 2.0 mmol/L    POCT Base Excess, Venous -8.3 (L) -2.0 - 3.0 mmol/L    POCT HCO3 Calculated, Venous 19.9 (L) 22.0 - 26.0 mmol/L    POCT Hemoglobin, Venous 11.0 (L) 13.5 - 17.5 g/dL    POCT Anion Gap, Venous 15.0 10.0 - 25.0 mmol/L    Patient Temperature 37.0 degrees Celsius    FiO2 44 %   BLOOD GAS VENOUS FULL PANEL   Result Value Ref Range    POCT pH, Venous 7.31 (L) 7.33 - 7.43 pH    POCT pCO2, Venous 19 (L) 41 - 51 mm Hg    POCT pO2, Venous 93 (H) 35 - 45 mm Hg    POCT SO2, Venous 99 (H) 45 - 75 %    POCT Oxy Hemoglobin, Venous 96.5 (H) 45.0 - 75.0 %    POCT Hematocrit Calculated, Venous 16.0 (L) 41.0 - 52.0 %    POCT Sodium, Venous 143 136 - 145 mmol/L    POCT Potassium, Venous 1.4 (LL) 3.5 - 5.3 mmol/L    POCT Chloride, Venous      POCT Ionized Calicum, Venous 0.61 (LL) 1.10 - 1.33 mmol/L    POCT Glucose, Venous 68 (L) 74 - 99 mg/dL    POCT Lactate, Venous 0.4 0.4 - 2.0 mmol/L    POCT Base Excess, Venous -15.3 (L) -2.0 - 3.0 mmol/L    POCT HCO3 Calculated, Venous 9.6 (L) 22.0 - 26.0 mmol/L    POCT Hemoglobin, Venous 5.4 (LL) 13.5 - 17.5 g/dL    POCT Anion Gap, Venous      Patient Temperature 37.0 degrees Celsius    FiO2 21 %   BLOOD GAS VENOUS FULL PANEL   Result Value Ref Range    POCT pH, Venous 7.28 (L) 7.33 - 7.43 pH    POCT pCO2, Venous 39 (L) 41 - 51 mm Hg    POCT pO2, Venous 74 (H) 35 - 45 mm Hg    POCT SO2, Venous 96 (H) 45 - 75 %    POCT Oxy Hemoglobin, Venous 94.4 (H) 45.0 - 75.0 %    POCT Hematocrit Calculated, Venous 27.0 (L) 41.0 - 52.0 %    POCT Sodium, Venous 138 136 - 145 mmol/L    POCT Potassium, Venous 3.1 (L) 3.5 - 5.3 mmol/L    POCT Chloride, Venous 107 98 - 107 mmol/L    POCT Ionized Calicum, Venous 1.02 (L) 1.10 - 1.33 mmol/L    POCT Glucose, Venous 119 (H) 74 - 99 mg/dL    POCT Lactate, Venous 0.9 0.4  - 2.0 mmol/L    POCT Base Excess, Venous -7.8 (L) -2.0 - 3.0 mmol/L    POCT HCO3 Calculated, Venous 18.3 (L) 22.0 - 26.0 mmol/L    POCT Hemoglobin, Venous 8.9 (L) 13.5 - 17.5 g/dL    POCT Anion Gap, Venous 16.0 10.0 - 25.0 mmol/L    Patient Temperature 37.0 degrees Celsius    FiO2 21 %   Magnesium   Result Value Ref Range    Magnesium 1.71 1.60 - 2.40 mg/dL   Renal Function Panel   Result Value Ref Range    Glucose 94 74 - 99 mg/dL    Sodium 141 136 - 145 mmol/L    Potassium 3.4 (L) 3.5 - 5.3 mmol/L    Chloride 111 (H) 98 - 107 mmol/L    Bicarbonate 17 (L) 21 - 32 mmol/L    Anion Gap 16 10 - 20 mmol/L    Urea Nitrogen 42 (H) 6 - 23 mg/dL    Creatinine 3.13 (H) 0.50 - 1.30 mg/dL    eGFR 28 (L) >60 mL/min/1.73m*2    Calcium 7.0 (L) 8.6 - 10.6 mg/dL    Phosphorus 5.7 (H) 2.5 - 4.9 mg/dL    Albumin 3.2 (L) 3.4 - 5.0 g/dL   CBC and Auto Differential   Result Value Ref Range    WBC 9.0 4.4 - 11.3 x10*3/uL    nRBC 0.0 0.0 - 0.0 /100 WBCs    RBC 2.97 (L) 4.50 - 5.90 x10*6/uL    Hemoglobin 8.8 (L) 13.5 - 17.5 g/dL    Hematocrit 27.5 (L) 41.0 - 52.0 %    MCV 93 80 - 100 fL    MCH 29.6 26.0 - 34.0 pg    MCHC 32.0 32.0 - 36.0 g/dL    RDW 13.0 11.5 - 14.5 %    Platelets 132 (L) 150 - 450 x10*3/uL    Neutrophils % 94.8 40.0 - 80.0 %    Immature Granulocytes %, Automated 0.6 0.0 - 0.9 %    Lymphocytes % 1.3 13.0 - 44.0 %    Monocytes % 3.2 2.0 - 10.0 %    Eosinophils % 0.0 0.0 - 6.0 %    Basophils % 0.1 0.0 - 2.0 %    Neutrophils Absolute 8.49 (H) 1.20 - 7.70 x10*3/uL    Immature Granulocytes Absolute, Automated 0.05 0.00 - 0.70 x10*3/uL    Lymphocytes Absolute 0.12 (L) 1.20 - 4.80 x10*3/uL    Monocytes Absolute 0.29 0.10 - 1.00 x10*3/uL    Eosinophils Absolute 0.00 0.00 - 0.70 x10*3/uL    Basophils Absolute 0.01 0.00 - 0.10 x10*3/uL    Plan:    - Right QL single shot blocks performed preoperatively on 4/25  - Pain medications per primary team  - Acute pain to see patient tomorrow to check on thigh numbness    Acute Pain Team  pg  [phone]  [phone].

## 2024-04-27 LAB
ALBUMIN SERPL BCP-MCNC: 3.1 G/DL (ref 3.4–5)
ANION GAP SERPL CALC-SCNC: 11 MMOL/L (ref 10–20)
BASOPHILS # BLD AUTO: 0 X10*3/UL (ref 0–0.1)
BASOPHILS NFR BLD AUTO: 0 %
BUN SERPL-MCNC: 30 MG/DL (ref 6–23)
CALCIUM SERPL-MCNC: 8.3 MG/DL (ref 8.6–10.6)
CHLORIDE SERPL-SCNC: 114 MMOL/L (ref 98–107)
CO2 SERPL-SCNC: 23 MMOL/L (ref 21–32)
CREAT SERPL-MCNC: 1.53 MG/DL (ref 0.5–1.3)
EGFRCR SERPLBLD CKD-EPI 2021: 66 ML/MIN/1.73M*2
EOSINOPHIL # BLD AUTO: 0.01 X10*3/UL (ref 0–0.7)
EOSINOPHIL NFR BLD AUTO: 0.2 %
ERYTHROCYTE [DISTWIDTH] IN BLOOD BY AUTOMATED COUNT: 12.7 % (ref 11.5–14.5)
FERRITIN SERPL-MCNC: 331 NG/ML (ref 20–300)
GLUCOSE SERPL-MCNC: 102 MG/DL (ref 74–99)
HCT VFR BLD AUTO: 28.9 % (ref 41–52)
HGB BLD-MCNC: 9.3 G/DL (ref 13.5–17.5)
IMM GRANULOCYTES # BLD AUTO: 0.02 X10*3/UL (ref 0–0.7)
IMM GRANULOCYTES NFR BLD AUTO: 0.3 % (ref 0–0.9)
LYMPHOCYTES # BLD AUTO: 0.23 X10*3/UL (ref 1.2–4.8)
LYMPHOCYTES NFR BLD AUTO: 3.6 %
MAGNESIUM SERPL-MCNC: 1.97 MG/DL (ref 1.6–2.4)
MCH RBC QN AUTO: 29.3 PG (ref 26–34)
MCHC RBC AUTO-ENTMCNC: 32.2 G/DL (ref 32–36)
MCV RBC AUTO: 91 FL (ref 80–100)
MONOCYTES # BLD AUTO: 0.39 X10*3/UL (ref 0.1–1)
MONOCYTES NFR BLD AUTO: 6 %
NEUTROPHILS # BLD AUTO: 5.8 X10*3/UL (ref 1.2–7.7)
NEUTROPHILS NFR BLD AUTO: 89.9 %
NRBC BLD-RTO: 0 /100 WBCS (ref 0–0)
PHOSPHATE SERPL-MCNC: 3.1 MG/DL (ref 2.5–4.9)
PLATELET # BLD AUTO: 130 X10*3/UL (ref 150–450)
POTASSIUM SERPL-SCNC: 3.7 MMOL/L (ref 3.5–5.3)
RBC # BLD AUTO: 3.17 X10*6/UL (ref 4.5–5.9)
SODIUM SERPL-SCNC: 144 MMOL/L (ref 136–145)
TACROLIMUS BLD-MCNC: <2 NG/ML
WBC # BLD AUTO: 6.5 X10*3/UL (ref 4.4–11.3)

## 2024-04-27 PROCEDURE — 2500000004 HC RX 250 GENERAL PHARMACY W/ HCPCS (ALT 636 FOR OP/ED)

## 2024-04-27 PROCEDURE — 36415 COLL VENOUS BLD VENIPUNCTURE: CPT

## 2024-04-27 PROCEDURE — 2500000001 HC RX 250 WO HCPCS SELF ADMINISTERED DRUGS (ALT 637 FOR MEDICARE OP)

## 2024-04-27 PROCEDURE — 99232 SBSQ HOSP IP/OBS MODERATE 35: CPT | Performed by: TRANSPLANT SURGERY

## 2024-04-27 PROCEDURE — 99233 SBSQ HOSP IP/OBS HIGH 50: CPT | Performed by: HOSPITALIST

## 2024-04-27 PROCEDURE — 80197 ASSAY OF TACROLIMUS: CPT

## 2024-04-27 PROCEDURE — 99231 SBSQ HOSP IP/OBS SF/LOW 25: CPT

## 2024-04-27 PROCEDURE — 2500000004 HC RX 250 GENERAL PHARMACY W/ HCPCS (ALT 636 FOR OP/ED): Performed by: STUDENT IN AN ORGANIZED HEALTH CARE EDUCATION/TRAINING PROGRAM

## 2024-04-27 PROCEDURE — 85025 COMPLETE CBC W/AUTO DIFF WBC: CPT

## 2024-04-27 PROCEDURE — 83735 ASSAY OF MAGNESIUM: CPT

## 2024-04-27 PROCEDURE — 1200000002 HC GENERAL ROOM WITH TELEMETRY DAILY

## 2024-04-27 PROCEDURE — 2500000006 HC RX 250 W HCPCS SELF ADMINISTERED DRUGS (ALT 637 FOR ALL PAYERS)

## 2024-04-27 PROCEDURE — 82728 ASSAY OF FERRITIN: CPT

## 2024-04-27 PROCEDURE — 80069 RENAL FUNCTION PANEL: CPT

## 2024-04-27 RX ORDER — ACETAMINOPHEN 325 MG/1
650 TABLET ORAL ONCE
Status: COMPLETED | OUTPATIENT
Start: 2024-04-27 | End: 2024-04-27

## 2024-04-27 RX ORDER — DIPHENHYDRAMINE HCL 25 MG
25 CAPSULE ORAL ONCE
Status: COMPLETED | OUTPATIENT
Start: 2024-04-27 | End: 2024-04-27

## 2024-04-27 RX ORDER — TACROLIMUS 1 MG/1
2 CAPSULE ORAL EVERY 12 HOURS
Status: DISCONTINUED | OUTPATIENT
Start: 2024-04-27 | End: 2024-04-29

## 2024-04-27 RX ORDER — PANTOPRAZOLE SODIUM 40 MG/1
40 TABLET, DELAYED RELEASE ORAL
Status: DISCONTINUED | OUTPATIENT
Start: 2024-04-27 | End: 2024-04-29 | Stop reason: HOSPADM

## 2024-04-27 RX ORDER — POTASSIUM CHLORIDE 20 MEQ/1
20 TABLET, EXTENDED RELEASE ORAL ONCE
Status: COMPLETED | OUTPATIENT
Start: 2024-04-27 | End: 2024-04-27

## 2024-04-27 RX ADMIN — HEPARIN SODIUM 5000 UNITS: 5000 INJECTION INTRAVENOUS; SUBCUTANEOUS at 20:50

## 2024-04-27 RX ADMIN — IRON SUCROSE 300 MG: 20 INJECTION, SOLUTION INTRAVENOUS at 12:33

## 2024-04-27 RX ADMIN — ACETAMINOPHEN 650 MG: 325 TABLET ORAL at 22:01

## 2024-04-27 RX ADMIN — DIPHENHYDRAMINE HYDROCHLORIDE 25 MG: 25 CAPSULE ORAL at 10:18

## 2024-04-27 RX ADMIN — DARBEPOETIN ALFA 40 MCG: 40 INJECTION, SOLUTION INTRAVENOUS; SUBCUTANEOUS at 09:05

## 2024-04-27 RX ADMIN — ACETAMINOPHEN 650 MG: 325 TABLET ORAL at 01:15

## 2024-04-27 RX ADMIN — HEPARIN SODIUM 5000 UNITS: 5000 INJECTION INTRAVENOUS; SUBCUTANEOUS at 05:36

## 2024-04-27 RX ADMIN — CLOTRIMAZOLE 10 MG: 10 LOZENGE ORAL; TOPICAL at 09:05

## 2024-04-27 RX ADMIN — SULFAMETHOXAZOLE AND TRIMETHOPRIM 80 MG: 400; 80 TABLET ORAL at 09:06

## 2024-04-27 RX ADMIN — ACETAMINOPHEN 650 MG: 325 TABLET ORAL at 10:18

## 2024-04-27 RX ADMIN — SENNOSIDES 8.6 MG: 8.6 TABLET, FILM COATED ORAL at 09:05

## 2024-04-27 RX ADMIN — SODIUM CHLORIDE, POTASSIUM CHLORIDE, SODIUM LACTATE AND CALCIUM CHLORIDE 75 ML/HR: 600; 310; 30; 20 INJECTION, SOLUTION INTRAVENOUS at 17:46

## 2024-04-27 RX ADMIN — CLOTRIMAZOLE 10 MG: 10 LOZENGE ORAL; TOPICAL at 12:33

## 2024-04-27 RX ADMIN — ACETAMINOPHEN 650 MG: 325 TABLET ORAL at 06:21

## 2024-04-27 RX ADMIN — SODIUM BICARBONATE 1300 MG: 650 TABLET ORAL at 20:50

## 2024-04-27 RX ADMIN — ACYCLOVIR 400 MG: 400 TABLET ORAL at 20:50

## 2024-04-27 RX ADMIN — MYCOPHENOLATE MOFETIL 1000 MG: 250 CAPSULE ORAL at 18:27

## 2024-04-27 RX ADMIN — PANTOPRAZOLE SODIUM 40 MG: 40 TABLET, DELAYED RELEASE ORAL at 06:19

## 2024-04-27 RX ADMIN — PANTOPRAZOLE SODIUM 40 MG: 40 TABLET, DELAYED RELEASE ORAL at 16:23

## 2024-04-27 RX ADMIN — ACYCLOVIR 400 MG: 400 TABLET ORAL at 09:06

## 2024-04-27 RX ADMIN — SODIUM BICARBONATE 1300 MG: 650 TABLET ORAL at 09:06

## 2024-04-27 RX ADMIN — TACROLIMUS 1 MG: 1 CAPSULE ORAL at 06:19

## 2024-04-27 RX ADMIN — HEPARIN SODIUM 5000 UNITS: 5000 INJECTION INTRAVENOUS; SUBCUTANEOUS at 13:06

## 2024-04-27 RX ADMIN — HEPARIN SODIUM 50 MG: 1000 INJECTION INTRAVENOUS; SUBCUTANEOUS at 10:47

## 2024-04-27 RX ADMIN — ACETAMINOPHEN 650 MG: 325 TABLET ORAL at 16:22

## 2024-04-27 RX ADMIN — CLOTRIMAZOLE 10 MG: 10 LOZENGE ORAL; TOPICAL at 17:45

## 2024-04-27 RX ADMIN — METHYLPREDNISOLONE SODIUM SUCCINATE 125 MG: 125 INJECTION, POWDER, FOR SOLUTION INTRAMUSCULAR; INTRAVENOUS at 09:00

## 2024-04-27 RX ADMIN — MYCOPHENOLATE MOFETIL 1000 MG: 250 CAPSULE ORAL at 06:18

## 2024-04-27 RX ADMIN — POTASSIUM CHLORIDE 20 MEQ: 1500 TABLET, EXTENDED RELEASE ORAL at 09:05

## 2024-04-27 RX ADMIN — TACROLIMUS 2 MG: 1 CAPSULE ORAL at 18:27

## 2024-04-27 ASSESSMENT — COGNITIVE AND FUNCTIONAL STATUS - GENERAL
WALKING IN HOSPITAL ROOM: A LITTLE
DRESSING REGULAR LOWER BODY CLOTHING: A LITTLE
DAILY ACTIVITIY SCORE: 20
MOVING TO AND FROM BED TO CHAIR: A LITTLE
WALKING IN HOSPITAL ROOM: A LITTLE
DRESSING REGULAR LOWER BODY CLOTHING: A LITTLE
MOBILITY SCORE: 21
MOVING TO AND FROM BED TO CHAIR: A LITTLE
HELP NEEDED FOR BATHING: A LITTLE
HELP NEEDED FOR BATHING: A LITTLE
TOILETING: A LITTLE
DRESSING REGULAR UPPER BODY CLOTHING: A LITTLE
DRESSING REGULAR UPPER BODY CLOTHING: A LITTLE
TOILETING: A LITTLE
MOBILITY SCORE: 21
DAILY ACTIVITIY SCORE: 20
CLIMB 3 TO 5 STEPS WITH RAILING: A LITTLE
CLIMB 3 TO 5 STEPS WITH RAILING: A LITTLE

## 2024-04-27 ASSESSMENT — PAIN SCALES - WONG BAKER
WONGBAKER_NUMERICALRESPONSE: NO HURT

## 2024-04-27 ASSESSMENT — PAIN SCALES - PAIN ASSESSMENT IN ADVANCED DEMENTIA (PAINAD)
TOTALSCORE: 0
BREATHING: NORMAL
FACIALEXPRESSION: SMILING OR INEXPRESSIVE
BODYLANGUAGE: RELAXED
CONSOLABILITY: NO NEED TO CONSOLE

## 2024-04-27 ASSESSMENT — PAIN SCALES - GENERAL
PAINLEVEL_OUTOF10: 0 - NO PAIN
PAINLEVEL_OUTOF10: 0 - NO PAIN
PAINLEVEL_OUTOF10: 3
PAINLEVEL_OUTOF10: 0 - NO PAIN

## 2024-04-27 ASSESSMENT — PAIN - FUNCTIONAL ASSESSMENT: PAIN_FUNCTIONAL_ASSESSMENT: 0-10

## 2024-04-27 ASSESSMENT — PAIN DESCRIPTION - DESCRIPTORS: DESCRIPTORS: ACHING;SORE

## 2024-04-27 NOTE — PROGRESS NOTES
Acute Pain Service    Postop Pain HPI -   Palliative: relieved with IV analgesics and regional local anesthetics  Provocative: movement  Quality:  burning and aching  Radiation:  none  Severity:  1/10  Timing: constant  - Reporting some R thigh numbness this AM    24-HOUR OPIOID CONSUMPTION:  Tylenol 650 x3, oxy 5 x2, gabapentin 300 x2, dilaudid 0.2 x3    Scheduled medications  acetaminophen, 650 mg, oral, q6h ROBERTO  acetaminophen, 650 mg, oral, Once  acyclovir, 400 mg, oral, q12h ROBERTO  antithymocyte globulin (rabbit), 1 mg/kg, intravenous, Once  clotrimazole, 10 mg, oral, TID with meals  darbepoetin rod, 40 mcg, subcutaneous, q14 days  diphenhydrAMINE, 25 mg, oral, Once  heparin (porcine), 5,000 Units, subcutaneous, q8h  methylPREDNISolone sodium succinate (PF), 125 mg, intravenous, Once  [START ON 4/28/2024] methylPREDNISolone sodium succinate (PF), 60 mg, intravenous, Once  mycophenolate, 1,000 mg, oral, q12h  pantoprazole, 40 mg, oral, BID AC  potassium chloride CR, 20 mEq, oral, Once  [START ON 4/29/2024] predniSONE, 20 mg, oral, Daily  sennosides, 1 tablet, oral, BID  sodium bicarbonate, 1,300 mg, oral, BID  sulfamethoxazole-trimethoprim, 80 mg, oral, Daily  tacrolimus, 1 mg, oral, q12h      Continuous medications  lactated Ringer's, 75 mL/hr, Last Rate: 75 mL/hr (04/27/24 0640)      PRN medications  PRN medications: acetaminophen, naloxone, ondansetron ODT **OR** ondansetron, oxyCODONE, oxygen, polyethylene glycol     Physical Exam:  Constitutional:  no distress, alert and cooperative  Eyes: clear sclera  Head/Neck: No apparent injury, trachea midline  Respiratory/Thorax: Patent airways, thorax symmetric, breathing comfortably  Cardiovascular: no pitting edema  Gastrointestinal: Nondistended  Musculoskeletal: ROM intact  Extremities: no clubbing  Neurological: alert, espino x4  Psychological: Appropriate affect    Results for orders placed or performed during the hospital encounter of 04/25/24 (from the past 24  hour(s))   Iron and TIBC   Result Value Ref Range    Iron 23 (L) 35 - 150 ug/dL    UIBC 154 110 - 370 ug/dL    TIBC 177 (L) 240 - 445 ug/dL    % Saturation 13 (L) 25 - 45 %   Renal function panel   Result Value Ref Range    Glucose 91 74 - 99 mg/dL    Sodium 144 136 - 145 mmol/L    Potassium 2.8 (LL) 3.5 - 5.3 mmol/L    Chloride 122 (H) 98 - 107 mmol/L    Bicarbonate 16 (L) 21 - 32 mmol/L    Anion Gap 9 (L) 10 - 20 mmol/L    Urea Nitrogen 26 (H) 6 - 23 mg/dL    Creatinine 1.61 (H) 0.50 - 1.30 mg/dL    eGFR 62 >60 mL/min/1.73m*2    Calcium 5.8 (L) 8.6 - 10.6 mg/dL    Phosphorus 2.7 2.5 - 4.9 mg/dL    Albumin 2.5 (L) 3.4 - 5.0 g/dL   Renal function panel   Result Value Ref Range    Glucose 119 (H) 74 - 99 mg/dL    Sodium 140 136 - 145 mmol/L    Potassium 3.6 3.5 - 5.3 mmol/L    Chloride 110 (H) 98 - 107 mmol/L    Bicarbonate 20 (L) 21 - 32 mmol/L    Anion Gap 14 10 - 20 mmol/L    Urea Nitrogen 29 (H) 6 - 23 mg/dL    Creatinine 1.59 (H) 0.50 - 1.30 mg/dL    eGFR 63 >60 mL/min/1.73m*2    Calcium 7.6 (L) 8.6 - 10.6 mg/dL    Phosphorus 2.7 2.5 - 4.9 mg/dL    Albumin 3.0 (L) 3.4 - 5.0 g/dL   Magnesium   Result Value Ref Range    Magnesium 1.97 1.60 - 2.40 mg/dL   Renal Function Panel   Result Value Ref Range    Glucose 102 (H) 74 - 99 mg/dL    Sodium 144 136 - 145 mmol/L    Potassium 3.7 3.5 - 5.3 mmol/L    Chloride 114 (H) 98 - 107 mmol/L    Bicarbonate 23 21 - 32 mmol/L    Anion Gap 11 10 - 20 mmol/L    Urea Nitrogen 30 (H) 6 - 23 mg/dL    Creatinine 1.53 (H) 0.50 - 1.30 mg/dL    eGFR 66 >60 mL/min/1.73m*2    Calcium 8.3 (L) 8.6 - 10.6 mg/dL    Phosphorus 3.1 2.5 - 4.9 mg/dL    Albumin 3.1 (L) 3.4 - 5.0 g/dL   CBC and Auto Differential   Result Value Ref Range    WBC 6.5 4.4 - 11.3 x10*3/uL    nRBC 0.0 0.0 - 0.0 /100 WBCs    RBC 3.17 (L) 4.50 - 5.90 x10*6/uL    Hemoglobin 9.3 (L) 13.5 - 17.5 g/dL    Hematocrit 28.9 (L) 41.0 - 52.0 %    MCV 91 80 - 100 fL    MCH 29.3 26.0 - 34.0 pg    MCHC 32.2 32.0 - 36.0 g/dL    RDW  12.7 11.5 - 14.5 %    Platelets 130 (L) 150 - 450 x10*3/uL    Neutrophils % 89.9 40.0 - 80.0 %    Immature Granulocytes %, Automated 0.3 0.0 - 0.9 %    Lymphocytes % 3.6 13.0 - 44.0 %    Monocytes % 6.0 2.0 - 10.0 %    Eosinophils % 0.2 0.0 - 6.0 %    Basophils % 0.0 0.0 - 2.0 %    Neutrophils Absolute 5.80 1.20 - 7.70 x10*3/uL    Immature Granulocytes Absolute, Automated 0.02 0.00 - 0.70 x10*3/uL    Lymphocytes Absolute 0.23 (L) 1.20 - 4.80 x10*3/uL    Monocytes Absolute 0.39 0.10 - 1.00 x10*3/uL    Eosinophils Absolute 0.01 0.00 - 0.70 x10*3/uL    Basophils Absolute 0.00 0.00 - 0.10 x10*3/uL    Plan:    - Right QL single shot blocks performed preoperatively on 4/25  - Pt reports slight numbness in his RLE  - Rest of pain management per primary team  - Acute Pain Service will sign off at this time. Please let us know if any further questions or concerns arise.      Acute Pain Team  pg [phone] ph [phone].

## 2024-04-27 NOTE — NURSING NOTE
Transplant Coordinator Education Note:    Met with patient and his father today for introductions, explanation of role, and beginning education. They were provided with access link to online [facility] post-transplant education video. All of their questions were answered. Will continue to follow for educational needs and transition to outpatient transplant coordinator care.

## 2024-04-27 NOTE — PROGRESS NOTES
"Transplant Nephrology progress note     Date of admission: 4/25/2024     Trever Ellison is a 19 y.o.  with [facility]   Past Medical History:   Diagnosis Date    Acute Crohn's disease (Multi)     colitis    Aluminum bone disease     Anemia     Angina pectoris (CMS-HCC)     Cachexia (Multi)     End stage chronic kidney disease (Multi)     Dialysis    Hemodialysis patient (CMS-HCC)     Hydronephrosis     1 yo    Nephritis     Tachycardia     Vitamin D deficiency         SUBJECTIVE:    Denied any compliants   PROBLEM LIST:  Principal Problem:    ESRD (end stage renal disease) (Multi)  Active Problems:    Kidney replaced by transplant ([facility])         ALLERGIES:  Allergies   Allergen Reactions    Cephalexin Rash    Methotrexate Headache, Other and Unknown     \"Did not tolerate well\", per mom. \" He would get sick, it caused nausea and headaches.\"    Adhesive Tape-Silicones Rash     Redness, raw skin            CURRENT MEDICATIONS:  Scheduled medications  acetaminophen, 650 mg, oral, q6h ROBERTO  acyclovir, 400 mg, oral, q12h ROBERTO  antithymocyte globulin (rabbit), 1 mg/kg, intravenous, Once  clotrimazole, 10 mg, oral, TID with meals  darbepoetin rod, 40 mcg, subcutaneous, q14 days  heparin (porcine), 5,000 Units, subcutaneous, q8h  iron sucrose, 300 mg, intravenous, Every other day  [START ON 4/28/2024] methylPREDNISolone sodium succinate (PF), 60 mg, intravenous, Once  mycophenolate, 1,000 mg, oral, q12h  pantoprazole, 40 mg, oral, BID AC  [START ON 4/29/2024] predniSONE, 20 mg, oral, Daily  sennosides, 1 tablet, oral, BID  sodium bicarbonate, 1,300 mg, oral, BID  sulfamethoxazole-trimethoprim, 80 mg, oral, Daily  tacrolimus, 2 mg, oral, q12h      Continuous medications  lactated Ringer's, 75 mL/hr, Last Rate: 75 mL/hr (04/27/24 0640)      PRN medications  PRN medications: acetaminophen, naloxone, ondansetron ODT **OR** ondansetron, oxyCODONE, oxygen, polyethylene glycol       OBJECTIVE:    VITALS: Visit Vitals  /81 (BP " "Location: Right arm, Patient Position: Lying)   Pulse (!) 119   Temp 35.8 °C (96.4 °F)   Resp 18   Ht 1.676 m (5' 5.98\")   Wt 50 kg (110 lb 3.7 oz)   SpO2 95%   BMI 17.80 kg/m²   Smoking Status Never   BSA 1.53 m²        General: No distress   Mucosa moist   AI, AC, AF     HEENT: PEERLA  CVS: S1 S2 no murmurs  RESP:  Lungs clear to auscultation   ABDO: Soft surgical site look clean with no drainage  Neuro: A + O x 3  Skin: No rash   Extremities: No edema       LABS:  Results from last 72 hours   Lab Units 04/27/24  0542   WBC AUTO x10*3/uL 6.5   HEMOGLOBIN g/dL 9.3*   MCV fL 91   PLATELETS AUTO x10*3/uL 130*   BUN mg/dL 30*   CREATININE mg/dL 1.53*   CALCIUM mg/dL 8.3*   TACROLIMUS ng/mL <2.0            Intake/Output Summary (Last 24 hours) at 4/27/2024 1517  Last data filed at 4/27/2024 1400  Gross per 24 hour   Intake 1341.25 ml   Output 2325 ml   Net -983.75 ml          ASSESSMENT AND PLAN:    Trever Ellison is a 19 y.o. with a history of congenital hydronephrosis and end-stage renal disease secondary to chronic Remicade use secondary to chron's disease.  Currently admitted for living related kidney transplant done on 4/25/2024.    Kidney info: Living related transplant, PRA 0%, HCV D-/are minus, CMV D-/R-, EBV D+/R-, HBV D-/R-.    Graft function: S/p a living transplant on 4/25/2024  -Spontaneous graft function with a downtrending creatinine, sig hypokalemia -on replacement and relpacement fluids  -Unremarkable ultrasound.  -Continue monitoring I's and O's and kidney function    Immunosuppression:  -Thymoglobulin induction with 4.5 mg/kg day 2 today  -Will be maintained on tacrolimus plus MMF plus prednisone    Hemodynamics:  -Blood pressures well-controlled.    Infectious prophylaxis:  -Acyclovir, Bactrim, clotrimazole    Bone mineral disease  -Calcium and phosphorus levels are optimal    Anemia/leukopenia  -Starting Aranesp 40 mcg every other week  -start venofer        Thank you for consulting .  Santa" Areli ARTEAGA       Notes created by Lawrence -Please excuse the Typos .

## 2024-04-27 NOTE — NURSING NOTE
RN spoke with Transplant team member Selene regarding the patient's heart rate. He has been in the 100s-130s throughout the day, patient denied pain.  No new orders per team, advised to continue to monitor heart rate.  Livia Potter RN

## 2024-04-27 NOTE — PROGRESS NOTES
"Trever Ellison is a 19 y.o. male on day 2 of admission presenting with ESRD (end stage renal disease) (Multi).    Subjective   Doing well after kidney transplant       Objective   Vitals:    04/27/24 0745   BP: 124/80   Pulse: 103   Resp: 18   Temp: 36.6 °C (97.9 °F)   SpO2: 94%       Physical Exam  Constitutional:       Appearance: Normal appearance.   Eyes:      Pupils: Pupils are equal, round, and reactive to light.   Cardiovascular:      Rate and Rhythm: Normal rate.   Pulmonary:      Effort: Pulmonary effort is normal. No respiratory distress.   Abdominal:      General: There is no distension.      Palpations: Abdomen is soft.      Comments: Incision clean, dry, intact   Musculoskeletal:         General: Normal range of motion.      Right lower leg: No edema.      Left lower leg: No edema.   Skin:     General: Skin is warm and dry.   Neurological:      General: No focal deficit present.      Mental Status: He is alert and oriented to person, place, and time.   Psychiatric:         Mood and Affect: Mood normal.         Behavior: Behavior normal.         Last Recorded Vitals  Blood pressure 124/80, pulse 103, temperature 36.6 °C (97.9 °F), temperature source Temporal, resp. rate 18, height 1.676 m (5' 5.98\"), weight 50 kg (110 lb 3.7 oz), SpO2 94%.  Intake/Output last 3 Shifts:  I/O last 3 completed shifts:  In: 3138.3 (62.8 mL/kg) [P.O.:118; I.V.:2816.3 (56.3 mL/kg); IV Piggyback:204]  Out: 6315 (126.3 mL/kg) [Urine:6315 (3.5 mL/kg/hr)]  Weight: 50 kg     Relevant Results  Lab Results   Component Value Date    WBC 6.5 04/27/2024    HGB 9.3 (L) 04/27/2024    HCT 28.9 (L) 04/27/2024    MCV 91 04/27/2024     (L) 04/27/2024     Lab Results   Component Value Date    GLUCOSE 102 (H) 04/27/2024    CALCIUM 8.3 (L) 04/27/2024     04/27/2024    K 3.7 04/27/2024    CO2 23 04/27/2024     (H) 04/27/2024    BUN 30 (H) 04/27/2024    CREATININE 1.53 (H) 04/27/2024     acetaminophen, 650 mg, oral, q6h " ROBERTO  acetaminophen, 650 mg, oral, Once  acyclovir, 400 mg, oral, q12h ROBERTO  antithymocyte globulin (rabbit), 1 mg/kg, intravenous, Once  clotrimazole, 10 mg, oral, TID with meals  darbepoetin rod, 40 mcg, subcutaneous, q14 days  diphenhydrAMINE, 25 mg, oral, Once  heparin (porcine), 5,000 Units, subcutaneous, q8h  methylPREDNISolone sodium succinate (PF), 125 mg, intravenous, Once  [START ON 4/28/2024] methylPREDNISolone sodium succinate (PF), 60 mg, intravenous, Once  mycophenolate, 1,000 mg, oral, q12h  pantoprazole, 40 mg, oral, BID AC  [START ON 4/29/2024] predniSONE, 20 mg, oral, Daily  sennosides, 1 tablet, oral, BID  sodium bicarbonate, 1,300 mg, oral, BID  sulfamethoxazole-trimethoprim, 80 mg, oral, Daily  tacrolimus, 1 mg, oral, q12h          Assessment/Plan   Principal Problem:    ESRD (end stage renal disease) (Multi)  Active Problems:    Kidney replaced by transplant ([facility])    LRKT from father 4/25/2024    Kidney allograft function  Excellent, down trending creatinine    Immunosuppression   Thymo planning 4.5mg/kg, last dose today   Tac 1/1   MMF 1000/1000   Pred taper    Crohn's disease   Re-assess for needs of additional biologic agents at a later time    Dispo Planning Monday   Pathway      I spent 35 minutes in the professional and overall care of this patient.      Olman Williamson MD

## 2024-04-27 NOTE — CARE PLAN
The patient's goals for the shift include  rate pain to less than 6/10 by the end of the shift.    The clinical goals for the shift include Patient will remain safe and free from injury throughout shift.

## 2024-04-27 NOTE — CARE PLAN
The patient's goals for the shift include resting    The clinical goals for the shift include patient increase ambulation      Problem: Pain  Goal: My pain/discomfort is manageable  Outcome: Progressing     Problem: Safety  Goal: Patient will be injury free during hospitalization  Outcome: Progressing  Goal: I will remain free of falls  Outcome: Progressing     Problem: Daily Care  Goal: Daily care needs are met  Outcome: Progressing     Problem: Psychosocial Needs  Goal: Demonstrates ability to cope with hospitalization/illness  Outcome: Progressing  Goal: Collaborate with me, my family, and caregiver to identify my specific goals  Outcome: Progressing     Problem: Discharge Barriers  Goal: My discharge needs are met  Outcome: Progressing

## 2024-04-28 LAB
ALBUMIN SERPL BCP-MCNC: 2.9 G/DL (ref 3.4–5)
ALBUMIN SERPL BCP-MCNC: 3.6 G/DL (ref 3.4–5)
ANION GAP SERPL CALC-SCNC: 13 MMOL/L (ref 10–20)
ANION GAP SERPL CALC-SCNC: 9 MMOL/L (ref 10–20)
BASOPHILS # BLD AUTO: 0 X10*3/UL (ref 0–0.1)
BASOPHILS NFR BLD AUTO: 0 %
BUN SERPL-MCNC: 22 MG/DL (ref 6–23)
BUN SERPL-MCNC: 26 MG/DL (ref 6–23)
CALCIUM SERPL-MCNC: 7.7 MG/DL (ref 8.6–10.6)
CALCIUM SERPL-MCNC: 8.5 MG/DL (ref 8.6–10.6)
CHLORIDE SERPL-SCNC: 111 MMOL/L (ref 98–107)
CHLORIDE SERPL-SCNC: 117 MMOL/L (ref 98–107)
CO2 SERPL-SCNC: 24 MMOL/L (ref 21–32)
CO2 SERPL-SCNC: 24 MMOL/L (ref 21–32)
CREAT SERPL-MCNC: 1.22 MG/DL (ref 0.5–1.3)
CREAT SERPL-MCNC: 1.32 MG/DL (ref 0.5–1.3)
EGFRCR SERPLBLD CKD-EPI 2021: 79 ML/MIN/1.73M*2
EGFRCR SERPLBLD CKD-EPI 2021: 87 ML/MIN/1.73M*2
EOSINOPHIL # BLD AUTO: 0.01 X10*3/UL (ref 0–0.7)
EOSINOPHIL NFR BLD AUTO: 0.2 %
ERYTHROCYTE [DISTWIDTH] IN BLOOD BY AUTOMATED COUNT: 13.1 % (ref 11.5–14.5)
GLUCOSE SERPL-MCNC: 103 MG/DL (ref 74–99)
GLUCOSE SERPL-MCNC: 194 MG/DL (ref 74–99)
HCT VFR BLD AUTO: 27.3 % (ref 41–52)
HGB BLD-MCNC: 8.7 G/DL (ref 13.5–17.5)
IMM GRANULOCYTES # BLD AUTO: 0.02 X10*3/UL (ref 0–0.7)
IMM GRANULOCYTES NFR BLD AUTO: 0.4 % (ref 0–0.9)
LYMPHOCYTES # BLD AUTO: 0.37 X10*3/UL (ref 1.2–4.8)
LYMPHOCYTES NFR BLD AUTO: 8 %
MAGNESIUM SERPL-MCNC: 2.24 MG/DL (ref 1.6–2.4)
MCH RBC QN AUTO: 29.6 PG (ref 26–34)
MCHC RBC AUTO-ENTMCNC: 31.9 G/DL (ref 32–36)
MCV RBC AUTO: 93 FL (ref 80–100)
MONOCYTES # BLD AUTO: 0.36 X10*3/UL (ref 0.1–1)
MONOCYTES NFR BLD AUTO: 7.8 %
NEUTROPHILS # BLD AUTO: 3.88 X10*3/UL (ref 1.2–7.7)
NEUTROPHILS NFR BLD AUTO: 83.6 %
NRBC BLD-RTO: 0 /100 WBCS (ref 0–0)
PHOSPHATE SERPL-MCNC: 1.3 MG/DL (ref 2.5–4.9)
PHOSPHATE SERPL-MCNC: 1.8 MG/DL (ref 2.5–4.9)
PLATELET # BLD AUTO: 106 X10*3/UL (ref 150–450)
POTASSIUM SERPL-SCNC: 3.7 MMOL/L (ref 3.5–5.3)
POTASSIUM SERPL-SCNC: 3.8 MMOL/L (ref 3.5–5.3)
RBC # BLD AUTO: 2.94 X10*6/UL (ref 4.5–5.9)
SODIUM SERPL-SCNC: 144 MMOL/L (ref 136–145)
SODIUM SERPL-SCNC: 146 MMOL/L (ref 136–145)
TACROLIMUS BLD-MCNC: 5.7 NG/ML
WBC # BLD AUTO: 4.6 X10*3/UL (ref 4.4–11.3)

## 2024-04-28 PROCEDURE — 2500000004 HC RX 250 GENERAL PHARMACY W/ HCPCS (ALT 636 FOR OP/ED)

## 2024-04-28 PROCEDURE — 2500000006 HC RX 250 W HCPCS SELF ADMINISTERED DRUGS (ALT 637 FOR ALL PAYERS)

## 2024-04-28 PROCEDURE — 2500000001 HC RX 250 WO HCPCS SELF ADMINISTERED DRUGS (ALT 637 FOR MEDICARE OP): Performed by: STUDENT IN AN ORGANIZED HEALTH CARE EDUCATION/TRAINING PROGRAM

## 2024-04-28 PROCEDURE — 2500000001 HC RX 250 WO HCPCS SELF ADMINISTERED DRUGS (ALT 637 FOR MEDICARE OP)

## 2024-04-28 PROCEDURE — 99233 SBSQ HOSP IP/OBS HIGH 50: CPT | Performed by: HOSPITALIST

## 2024-04-28 PROCEDURE — 80069 RENAL FUNCTION PANEL: CPT | Performed by: STUDENT IN AN ORGANIZED HEALTH CARE EDUCATION/TRAINING PROGRAM

## 2024-04-28 PROCEDURE — 99232 SBSQ HOSP IP/OBS MODERATE 35: CPT | Performed by: TRANSPLANT SURGERY

## 2024-04-28 PROCEDURE — 85025 COMPLETE CBC W/AUTO DIFF WBC: CPT

## 2024-04-28 PROCEDURE — 83735 ASSAY OF MAGNESIUM: CPT

## 2024-04-28 PROCEDURE — 36415 COLL VENOUS BLD VENIPUNCTURE: CPT

## 2024-04-28 PROCEDURE — 36415 COLL VENOUS BLD VENIPUNCTURE: CPT | Performed by: STUDENT IN AN ORGANIZED HEALTH CARE EDUCATION/TRAINING PROGRAM

## 2024-04-28 PROCEDURE — 80069 RENAL FUNCTION PANEL: CPT

## 2024-04-28 PROCEDURE — 2500000004 HC RX 250 GENERAL PHARMACY W/ HCPCS (ALT 636 FOR OP/ED): Performed by: STUDENT IN AN ORGANIZED HEALTH CARE EDUCATION/TRAINING PROGRAM

## 2024-04-28 PROCEDURE — 1200000002 HC GENERAL ROOM WITH TELEMETRY DAILY

## 2024-04-28 PROCEDURE — 80197 ASSAY OF TACROLIMUS: CPT

## 2024-04-28 RX ORDER — FUROSEMIDE 10 MG/ML
20 INJECTION INTRAMUSCULAR; INTRAVENOUS ONCE
Status: COMPLETED | OUTPATIENT
Start: 2024-04-28 | End: 2024-04-28

## 2024-04-28 RX ORDER — POTASSIUM CHLORIDE 20 MEQ/1
20 TABLET, EXTENDED RELEASE ORAL ONCE
Status: COMPLETED | OUTPATIENT
Start: 2024-04-28 | End: 2024-04-28

## 2024-04-28 RX ORDER — CALCIUM CARBONATE 200(500)MG
1000 TABLET,CHEWABLE ORAL ONCE
Status: COMPLETED | OUTPATIENT
Start: 2024-04-28 | End: 2024-04-28

## 2024-04-28 RX ADMIN — HEPARIN SODIUM 5000 UNITS: 5000 INJECTION INTRAVENOUS; SUBCUTANEOUS at 06:23

## 2024-04-28 RX ADMIN — TACROLIMUS 2 MG: 1 CAPSULE ORAL at 06:24

## 2024-04-28 RX ADMIN — POTASSIUM CHLORIDE 20 MEQ: 1500 TABLET, EXTENDED RELEASE ORAL at 19:28

## 2024-04-28 RX ADMIN — METHYLPREDNISOLONE SODIUM SUCCINATE 60 MG: 125 INJECTION, POWDER, FOR SOLUTION INTRAMUSCULAR; INTRAVENOUS at 09:02

## 2024-04-28 RX ADMIN — CLOTRIMAZOLE 10 MG: 10 LOZENGE ORAL; TOPICAL at 09:02

## 2024-04-28 RX ADMIN — MYCOPHENOLATE MOFETIL 1000 MG: 250 CAPSULE ORAL at 06:23

## 2024-04-28 RX ADMIN — SODIUM BICARBONATE 1300 MG: 650 TABLET ORAL at 20:31

## 2024-04-28 RX ADMIN — HEPARIN SODIUM 5000 UNITS: 5000 INJECTION INTRAVENOUS; SUBCUTANEOUS at 15:42

## 2024-04-28 RX ADMIN — ACYCLOVIR 400 MG: 400 TABLET ORAL at 09:02

## 2024-04-28 RX ADMIN — POTASSIUM PHOSPHATE, MONOBASIC 500 MG: 500 TABLET, SOLUBLE ORAL at 17:32

## 2024-04-28 RX ADMIN — PANTOPRAZOLE SODIUM 40 MG: 40 TABLET, DELAYED RELEASE ORAL at 06:23

## 2024-04-28 RX ADMIN — CLOTRIMAZOLE 10 MG: 10 LOZENGE ORAL; TOPICAL at 17:32

## 2024-04-28 RX ADMIN — OXYCODONE HYDROCHLORIDE 5 MG: 5 TABLET ORAL at 02:21

## 2024-04-28 RX ADMIN — CALCIUM CARBONATE (ANTACID) CHEW TAB 500 MG 1000 MG: 500 CHEW TAB at 09:02

## 2024-04-28 RX ADMIN — POTASSIUM PHOSPHATE, MONOBASIC 500 MG: 500 TABLET, SOLUBLE ORAL at 20:31

## 2024-04-28 RX ADMIN — SODIUM BICARBONATE 1300 MG: 650 TABLET ORAL at 09:02

## 2024-04-28 RX ADMIN — FUROSEMIDE 20 MG: 10 INJECTION, SOLUTION INTRAVENOUS at 12:30

## 2024-04-28 RX ADMIN — CLOTRIMAZOLE 10 MG: 10 LOZENGE ORAL; TOPICAL at 12:30

## 2024-04-28 RX ADMIN — MYCOPHENOLATE MOFETIL 1000 MG: 250 CAPSULE ORAL at 18:21

## 2024-04-28 RX ADMIN — HEPARIN SODIUM 5000 UNITS: 5000 INJECTION INTRAVENOUS; SUBCUTANEOUS at 23:12

## 2024-04-28 RX ADMIN — PANTOPRAZOLE SODIUM 40 MG: 40 TABLET, DELAYED RELEASE ORAL at 15:41

## 2024-04-28 RX ADMIN — ACYCLOVIR 400 MG: 400 TABLET ORAL at 20:31

## 2024-04-28 RX ADMIN — POTASSIUM PHOSPHATE, MONOBASIC 500 MG: 500 TABLET, SOLUBLE ORAL at 12:29

## 2024-04-28 RX ADMIN — SULFAMETHOXAZOLE AND TRIMETHOPRIM 80 MG: 400; 80 TABLET ORAL at 09:01

## 2024-04-28 RX ADMIN — TACROLIMUS 2 MG: 1 CAPSULE ORAL at 18:21

## 2024-04-28 ASSESSMENT — PAIN SCALES - GENERAL
PAINLEVEL_OUTOF10: 0 - NO PAIN

## 2024-04-28 ASSESSMENT — COGNITIVE AND FUNCTIONAL STATUS - GENERAL
DAILY ACTIVITIY SCORE: 24
MOBILITY SCORE: 24

## 2024-04-28 ASSESSMENT — PAIN SCALES - WONG BAKER
WONGBAKER_NUMERICALRESPONSE: NO HURT

## 2024-04-28 ASSESSMENT — PAIN SCALES - PAIN ASSESSMENT IN ADVANCED DEMENTIA (PAINAD)
BODYLANGUAGE: RELAXED
FACIALEXPRESSION: SMILING OR INEXPRESSIVE
BREATHING: NORMAL
TOTALSCORE: 0
CONSOLABILITY: NO NEED TO CONSOLE

## 2024-04-28 ASSESSMENT — PAIN DESCRIPTION - ORIENTATION: ORIENTATION: MID

## 2024-04-28 ASSESSMENT — PAIN - FUNCTIONAL ASSESSMENT
PAIN_FUNCTIONAL_ASSESSMENT: 0-10

## 2024-04-28 ASSESSMENT — PAIN DESCRIPTION - LOCATION: LOCATION: BACK

## 2024-04-28 NOTE — CARE PLAN
Problem: Safety  Goal: Patient will be injury free during hospitalization  Outcome: Progressing     The patient's goals for the shift include  safety.    The clinical goals for the shift include Safety.    Over the shift, the patient did make progress toward the following goals.

## 2024-04-28 NOTE — PROGRESS NOTES
"Transplant Nephrology progress note     Date of admission: 4/25/2024     Trever Ellison is a 19 y.o.  with [facility]   Past Medical History:   Diagnosis Date    Acute Crohn's disease (Multi)     colitis    Aluminum bone disease     Anemia     Angina pectoris (CMS-HCC)     Cachexia (Multi)     End stage chronic kidney disease (Multi)     Dialysis    Hemodialysis patient (CMS-HCC)     Hydronephrosis     1 yo    Nephritis     Tachycardia     Vitamin D deficiency         SUBJECTIVE:  Complained of some back pain otherwise no other issues.  PROBLEM LIST:  Principal Problem:    ESRD (end stage renal disease) (Multi)  Active Problems:    Kidney replaced by transplant ([facility])         ALLERGIES:  Allergies   Allergen Reactions    Cephalexin Rash    Methotrexate Headache, Other and Unknown     \"Did not tolerate well\", per mom. \" He would get sick, it caused nausea and headaches.\"    Adhesive Tape-Silicones Rash     Redness, raw skin            CURRENT MEDICATIONS:  Scheduled medications  acyclovir, 400 mg, oral, q12h ROBERTO  clotrimazole, 10 mg, oral, TID with meals  darbepoetin rod, 40 mcg, subcutaneous, q14 days  furosemide, 20 mg, intravenous, Once  heparin (porcine), 5,000 Units, subcutaneous, q8h  iron sucrose, 300 mg, intravenous, Every other day  mycophenolate, 1,000 mg, oral, q12h  pantoprazole, 40 mg, oral, BID AC  potassium phosphate (monobasic), 500 mg, oral, 4x daily  [START ON 4/29/2024] predniSONE, 20 mg, oral, Daily  sennosides, 1 tablet, oral, BID  sodium bicarbonate, 1,300 mg, oral, BID  sulfamethoxazole-trimethoprim, 80 mg, oral, Daily  tacrolimus, 2 mg, oral, q12h      Continuous medications       PRN medications  PRN medications: acetaminophen, naloxone, ondansetron ODT **OR** ondansetron, oxyCODONE, oxygen, polyethylene glycol       OBJECTIVE:    VITALS: Visit Vitals  /84 (BP Location: Right arm, Patient Position: Lying)   Pulse 98   Temp 36.7 °C (98.1 °F) (Temporal)   Resp 18   Ht 1.676 m (5' 5.98\")   Wt " 52.3 kg (115 lb 4.8 oz)   SpO2 94%   BMI 18.62 kg/m²   Smoking Status Never   BSA 1.56 m²        General: No distress   Mucosa moist   AI, AC, AF     HEENT: PEERLA  CVS: S1 S2 no murmurs  RESP:  Lungs clear to auscultation   ABDO: Soft surgical site look clean with no drainage  Neuro: A + O x 3  Skin: No rash   Extremities: No edema       LABS:  Results from last 72 hours   Lab Units 04/28/24  0603 04/27/24  0542   WBC AUTO x10*3/uL 4.6 6.5   HEMOGLOBIN g/dL 8.7* 9.3*   MCV fL 93 91   PLATELETS AUTO x10*3/uL 106* 130*   BUN mg/dL 26* 30*   CREATININE mg/dL 1.22 1.53*   CALCIUM mg/dL 7.7* 8.3*   TACROLIMUS ng/mL  --  <2.0            Intake/Output Summary (Last 24 hours) at 4/28/2024 1113  Last data filed at 4/28/2024 0900  Gross per 24 hour   Intake 960 ml   Output 2675 ml   Net -1715 ml          ASSESSMENT AND PLAN:    Trever Ellison is a 19 y.o. with a history of congenital hydronephrosis and end-stage renal disease secondary to chronic Remicade use secondary to chron's disease.  Currently admitted for living related kidney transplant done on 4/25/2024.    Kidney info: Living related transplant, PRA 0%, HCV D-/are minus, CMV D-/R-, EBV D+/R-, HBV D-/R-.    Graft function: S/p a living transplant on 4/25/2024  -Spontaneous graft function with a downtrending creatinine, sig hypokalemia -on replacement .  -Repeat RFP this evening if still hypernatremic then consider D5W  -Unremarkable ultrasound.  -Continue monitoring I's and O's and kidney function    Immunosuppression:  -Thymoglobulin induction with 4.5 mg/kg completed.  -Will be maintained on tacrolimus plus MMF plus prednisone    Hemodynamics:  -Blood pressures well-controlled.    Infectious prophylaxis:  -Acyclovir, Bactrim, clotrimazole    Bone mineral disease  -Phos levels running low currently on replacement.    Anemia/leukopenia  -Started Aranesp 40 mcg every other week  -started venofer        Thank you for consulting .  Santa Singleton MD       Notes  created by Lawrence -Please excuse the Typos .

## 2024-04-28 NOTE — PROGRESS NOTES
"Trever Ellison is a 19 y.o. male on day 3 of admission presenting with ESRD (end stage renal disease) (Multi).    Subjective   Doing well after kidney transplant       Objective   Vitals:    04/28/24 0734   BP: 127/84   Pulse: 98   Resp: 18   Temp: 36.7 °C (98.1 °F)   SpO2: 94%       Physical Exam  Constitutional:       Appearance: Normal appearance.   Eyes:      Pupils: Pupils are equal, round, and reactive to light.   Cardiovascular:      Rate and Rhythm: Normal rate.   Pulmonary:      Effort: Pulmonary effort is normal. No respiratory distress.   Abdominal:      General: There is no distension.      Palpations: Abdomen is soft.      Comments: Incision clean, dry, intact   Musculoskeletal:         General: Normal range of motion.      Right lower leg: No edema.      Left lower leg: No edema.   Skin:     General: Skin is warm and dry.   Neurological:      General: No focal deficit present.      Mental Status: He is alert and oriented to person, place, and time.   Psychiatric:         Mood and Affect: Mood normal.         Behavior: Behavior normal.         Last Recorded Vitals  Blood pressure 127/84, pulse 98, temperature 36.7 °C (98.1 °F), temperature source Temporal, resp. rate 18, height 1.676 m (5' 5.98\"), weight 52.3 kg (115 lb 4.8 oz), SpO2 94%.  Intake/Output last 3 Shifts:  I/O last 3 completed shifts:  In: 1493.8 (28.6 mL/kg) [I.V.:1493.8 (28.6 mL/kg)]  Out: 3525 (67.4 mL/kg) [Urine:3525 (1.9 mL/kg/hr)]  Weight: 52.3 kg     Relevant Results  Lab Results   Component Value Date    WBC 4.6 04/28/2024    HGB 8.7 (L) 04/28/2024    HCT 27.3 (L) 04/28/2024    MCV 93 04/28/2024     (L) 04/28/2024     Lab Results   Component Value Date    GLUCOSE 103 (H) 04/28/2024    CALCIUM 7.7 (L) 04/28/2024     (H) 04/28/2024    K 3.7 04/28/2024    CO2 24 04/28/2024     (H) 04/28/2024    BUN 26 (H) 04/28/2024    CREATININE 1.22 04/28/2024     acyclovir, 400 mg, oral, q12h ROBERTO  clotrimazole, 10 mg, oral, TID with " meals  darbepoetin rod, 40 mcg, subcutaneous, q14 days  heparin (porcine), 5,000 Units, subcutaneous, q8h  iron sucrose, 300 mg, intravenous, Every other day  mycophenolate, 1,000 mg, oral, q12h  pantoprazole, 40 mg, oral, BID AC  potassium phosphate, 30 mmol, intravenous, Once  [START ON 4/29/2024] predniSONE, 20 mg, oral, Daily  sennosides, 1 tablet, oral, BID  sodium bicarbonate, 1,300 mg, oral, BID  sulfamethoxazole-trimethoprim, 80 mg, oral, Daily  tacrolimus, 2 mg, oral, q12h          Assessment/Plan   Principal Problem:    ESRD (end stage renal disease) (Multi)  Active Problems:    Kidney replaced by transplant ([facility])    LRKT from father 4/25/2024    Kidney allograft function  Excellent, down trending creatinine  Monitor mild hypernatremia, hydrating better    Immunosuppression   Thymo 4.5mg/kg, completed   Tac 2/2   MMF 1000/1000   Pred taper    Crohn's disease   Re-assess for needs of additional biologic agents at a later time    Dispo Planning Monday   Pathway      I spent 35 minutes in the professional and overall care of this patient.      Olman Williamson MD

## 2024-04-28 NOTE — CARE PLAN
The patient's goals for the shift include      The clinical goals for the shift include pt remains safe and pain free

## 2024-04-29 ENCOUNTER — TELEPHONE (OUTPATIENT)
Dept: HOME HEALTH SERVICES | Facility: HOME HEALTH | Age: 19
End: 2024-04-29
Payer: COMMERCIAL

## 2024-04-29 ENCOUNTER — DOCUMENTATION (OUTPATIENT)
Dept: TRANSPLANT | Facility: HOSPITAL | Age: 19
End: 2024-04-29
Payer: COMMERCIAL

## 2024-04-29 ENCOUNTER — PHARMACY VISIT (OUTPATIENT)
Dept: PHARMACY | Facility: CLINIC | Age: 19
End: 2024-04-29
Payer: COMMERCIAL

## 2024-04-29 ENCOUNTER — TELEPHONE (OUTPATIENT)
Dept: TRANSPLANT | Facility: HOSPITAL | Age: 19
End: 2024-04-29
Payer: COMMERCIAL

## 2024-04-29 VITALS
HEART RATE: 110 BPM | BODY MASS INDEX: 17.63 KG/M2 | TEMPERATURE: 97.9 F | HEIGHT: 66 IN | DIASTOLIC BLOOD PRESSURE: 82 MMHG | SYSTOLIC BLOOD PRESSURE: 127 MMHG | WEIGHT: 109.7 LBS | OXYGEN SATURATION: 97 % | RESPIRATION RATE: 16 BRPM

## 2024-04-29 DIAGNOSIS — Z94.0 KIDNEY REPLACED BY TRANSPLANT (HHS-HCC): ICD-10-CM

## 2024-04-29 DIAGNOSIS — Z13.89 SCREENING FOR BLOOD OR PROTEIN IN URINE: ICD-10-CM

## 2024-04-29 DIAGNOSIS — Z91.89 AT RISK FOR INFECTION TRANSMITTED FROM DONOR: ICD-10-CM

## 2024-04-29 LAB
ALBUMIN SERPL BCP-MCNC: 3.3 G/DL (ref 3.4–5)
ANION GAP SERPL CALC-SCNC: 11 MMOL/L (ref 10–20)
BASOPHILS # BLD AUTO: 0 X10*3/UL (ref 0–0.1)
BASOPHILS NFR BLD AUTO: 0 %
BUN SERPL-MCNC: 22 MG/DL (ref 6–23)
CALCIUM SERPL-MCNC: 8.2 MG/DL (ref 8.6–10.6)
CHLORIDE SERPL-SCNC: 114 MMOL/L (ref 98–107)
CO2 SERPL-SCNC: 26 MMOL/L (ref 21–32)
CREAT SERPL-MCNC: 1.25 MG/DL (ref 0.5–1.3)
EGFRCR SERPLBLD CKD-EPI 2021: 85 ML/MIN/1.73M*2
EOSINOPHIL # BLD AUTO: 0.01 X10*3/UL (ref 0–0.7)
EOSINOPHIL NFR BLD AUTO: 0.3 %
ERYTHROCYTE [DISTWIDTH] IN BLOOD BY AUTOMATED COUNT: 13.1 % (ref 11.5–14.5)
GLUCOSE SERPL-MCNC: 88 MG/DL (ref 74–99)
HCT VFR BLD AUTO: 29.3 % (ref 41–52)
HGB BLD-MCNC: 9.2 G/DL (ref 13.5–17.5)
IMM GRANULOCYTES # BLD AUTO: 0.01 X10*3/UL (ref 0–0.7)
IMM GRANULOCYTES NFR BLD AUTO: 0.3 % (ref 0–0.9)
LYMPHOCYTES # BLD AUTO: 0.31 X10*3/UL (ref 1.2–4.8)
LYMPHOCYTES NFR BLD AUTO: 8.9 %
MAGNESIUM SERPL-MCNC: 2.11 MG/DL (ref 1.6–2.4)
MCH RBC QN AUTO: 29.7 PG (ref 26–34)
MCHC RBC AUTO-ENTMCNC: 31.4 G/DL (ref 32–36)
MCV RBC AUTO: 95 FL (ref 80–100)
MONOCYTES # BLD AUTO: 0.39 X10*3/UL (ref 0.1–1)
MONOCYTES NFR BLD AUTO: 11.2 %
NEUTROPHILS # BLD AUTO: 2.75 X10*3/UL (ref 1.2–7.7)
NEUTROPHILS NFR BLD AUTO: 79.3 %
NRBC BLD-RTO: 0 /100 WBCS (ref 0–0)
PHOSPHATE SERPL-MCNC: 2 MG/DL (ref 2.5–4.9)
PLATELET # BLD AUTO: 91 X10*3/UL (ref 150–450)
POTASSIUM SERPL-SCNC: 3.7 MMOL/L (ref 3.5–5.3)
RBC # BLD AUTO: 3.1 X10*6/UL (ref 4.5–5.9)
SODIUM SERPL-SCNC: 147 MMOL/L (ref 136–145)
TACROLIMUS BLD-MCNC: 6.3 NG/ML
WBC # BLD AUTO: 3.5 X10*3/UL (ref 4.4–11.3)

## 2024-04-29 PROCEDURE — 2500000006 HC RX 250 W HCPCS SELF ADMINISTERED DRUGS (ALT 637 FOR ALL PAYERS)

## 2024-04-29 PROCEDURE — 99232 SBSQ HOSP IP/OBS MODERATE 35: CPT | Performed by: STUDENT IN AN ORGANIZED HEALTH CARE EDUCATION/TRAINING PROGRAM

## 2024-04-29 PROCEDURE — 2500000001 HC RX 250 WO HCPCS SELF ADMINISTERED DRUGS (ALT 637 FOR MEDICARE OP)

## 2024-04-29 PROCEDURE — 2500000004 HC RX 250 GENERAL PHARMACY W/ HCPCS (ALT 636 FOR OP/ED)

## 2024-04-29 PROCEDURE — 99231 SBSQ HOSP IP/OBS SF/LOW 25: CPT | Performed by: INTERNAL MEDICINE

## 2024-04-29 PROCEDURE — 2500000004 HC RX 250 GENERAL PHARMACY W/ HCPCS (ALT 636 FOR OP/ED): Performed by: STUDENT IN AN ORGANIZED HEALTH CARE EDUCATION/TRAINING PROGRAM

## 2024-04-29 PROCEDURE — 80069 RENAL FUNCTION PANEL: CPT

## 2024-04-29 PROCEDURE — 83735 ASSAY OF MAGNESIUM: CPT

## 2024-04-29 PROCEDURE — 80197 ASSAY OF TACROLIMUS: CPT

## 2024-04-29 PROCEDURE — 85025 COMPLETE CBC W/AUTO DIFF WBC: CPT

## 2024-04-29 PROCEDURE — RXMED WILLOW AMBULATORY MEDICATION CHARGE

## 2024-04-29 PROCEDURE — 36415 COLL VENOUS BLD VENIPUNCTURE: CPT

## 2024-04-29 RX ORDER — CLOTRIMAZOLE 10 MG/1
10 LOZENGE ORAL; TOPICAL
Qty: 90 TABLET | Refills: 0 | Status: SHIPPED | OUTPATIENT
Start: 2024-04-29 | End: 2024-04-29 | Stop reason: SDUPTHER

## 2024-04-29 RX ORDER — PREDNISONE 5 MG/1
20 TABLET ORAL DAILY
Qty: 120 TABLET | Refills: 0 | Status: SHIPPED | OUTPATIENT
Start: 2024-04-29 | End: 2024-04-29 | Stop reason: SDUPTHER

## 2024-04-29 RX ORDER — FERROUS SULFATE 325(65) MG
325 TABLET ORAL
Qty: 30 TABLET | Refills: 0 | Status: SHIPPED | OUTPATIENT
Start: 2024-04-29 | End: 2024-04-29 | Stop reason: SDUPTHER

## 2024-04-29 RX ORDER — PANTOPRAZOLE SODIUM 40 MG/1
40 TABLET, DELAYED RELEASE ORAL DAILY
Qty: 30 TABLET | Refills: 0 | Status: SHIPPED | OUTPATIENT
Start: 2024-04-29 | End: 2024-05-29

## 2024-04-29 RX ORDER — TACROLIMUS 0.5 MG/1
0.5 CAPSULE ORAL 2 TIMES DAILY
Qty: 60 CAPSULE | Refills: 0 | Status: SHIPPED | OUTPATIENT
Start: 2024-04-29 | End: 2024-04-29 | Stop reason: SDUPTHER

## 2024-04-29 RX ORDER — POLYETHYLENE GLYCOL 3350 17 G/17G
17 POWDER, FOR SOLUTION ORAL DAILY
Qty: 510 G | Refills: 0 | Status: SHIPPED | OUTPATIENT
Start: 2024-04-29 | End: 2024-05-21 | Stop reason: ALTCHOICE

## 2024-04-29 RX ORDER — DOCUSATE SODIUM 100 MG/1
100 CAPSULE, LIQUID FILLED ORAL 2 TIMES DAILY PRN
Qty: 60 CAPSULE | Refills: 0 | Status: SHIPPED | OUTPATIENT
Start: 2024-04-29 | End: 2024-05-21 | Stop reason: ALTCHOICE

## 2024-04-29 RX ORDER — POLYETHYLENE GLYCOL 3350 17 G/17G
17 POWDER, FOR SOLUTION ORAL DAILY PRN
Qty: 510 G | Refills: 0 | Status: SHIPPED | OUTPATIENT
Start: 2024-04-29 | End: 2024-04-29 | Stop reason: SDUPTHER

## 2024-04-29 RX ORDER — ACYCLOVIR 400 MG/1
400 TABLET ORAL EVERY 12 HOURS SCHEDULED
Qty: 60 TABLET | Refills: 0 | Status: SHIPPED | OUTPATIENT
Start: 2024-04-29 | End: 2024-04-29 | Stop reason: SDUPTHER

## 2024-04-29 RX ORDER — TRAMADOL HYDROCHLORIDE 50 MG/1
50 TABLET ORAL EVERY 6 HOURS PRN
Qty: 15 TABLET | Refills: 0 | Status: SHIPPED | OUTPATIENT
Start: 2024-04-29 | End: 2024-05-04

## 2024-04-29 RX ORDER — TACROLIMUS 1 MG/1
2 CAPSULE ORAL EVERY 12 HOURS
Qty: 120 CAPSULE | Refills: 0 | Status: SHIPPED | OUTPATIENT
Start: 2024-04-29 | End: 2024-04-29 | Stop reason: SDUPTHER

## 2024-04-29 RX ORDER — SULFAMETHOXAZOLE AND TRIMETHOPRIM 400; 80 MG/1; MG/1
1 TABLET ORAL DAILY
Qty: 30 TABLET | Refills: 0 | Status: SHIPPED | OUTPATIENT
Start: 2024-04-29 | End: 2024-04-29 | Stop reason: SDUPTHER

## 2024-04-29 RX ORDER — MYCOPHENOLATE MOFETIL 250 MG/1
1000 CAPSULE ORAL EVERY 12 HOURS
Qty: 240 CAPSULE | Refills: 0 | Status: SHIPPED | OUTPATIENT
Start: 2024-04-29 | End: 2024-04-29 | Stop reason: SDUPTHER

## 2024-04-29 RX ADMIN — CLOTRIMAZOLE 10 MG: 10 LOZENGE ORAL; TOPICAL at 08:49

## 2024-04-29 RX ADMIN — PREDNISONE 20 MG: 20 TABLET ORAL at 08:49

## 2024-04-29 RX ADMIN — IRON SUCROSE 300 MG: 20 INJECTION, SOLUTION INTRAVENOUS at 08:47

## 2024-04-29 RX ADMIN — POTASSIUM PHOSPHATE, MONOBASIC 500 MG: 500 TABLET, SOLUBLE ORAL at 08:49

## 2024-04-29 RX ADMIN — ACYCLOVIR 400 MG: 400 TABLET ORAL at 08:49

## 2024-04-29 RX ADMIN — SULFAMETHOXAZOLE AND TRIMETHOPRIM 80 MG: 400; 80 TABLET ORAL at 08:49

## 2024-04-29 RX ADMIN — MYCOPHENOLATE MOFETIL 1000 MG: 250 CAPSULE ORAL at 06:31

## 2024-04-29 RX ADMIN — PANTOPRAZOLE SODIUM 40 MG: 40 TABLET, DELAYED RELEASE ORAL at 06:31

## 2024-04-29 RX ADMIN — CLOTRIMAZOLE 10 MG: 10 LOZENGE ORAL; TOPICAL at 12:12

## 2024-04-29 RX ADMIN — ACETAMINOPHEN 650 MG: 325 TABLET ORAL at 10:30

## 2024-04-29 RX ADMIN — POTASSIUM PHOSPHATE, MONOBASIC 500 MG: 500 TABLET, SOLUBLE ORAL at 12:12

## 2024-04-29 RX ADMIN — TACROLIMUS 2 MG: 1 CAPSULE ORAL at 06:31

## 2024-04-29 ASSESSMENT — PAIN - FUNCTIONAL ASSESSMENT
PAIN_FUNCTIONAL_ASSESSMENT: 0-10

## 2024-04-29 ASSESSMENT — COGNITIVE AND FUNCTIONAL STATUS - GENERAL
DAILY ACTIVITIY SCORE: 24
MOBILITY SCORE: 24

## 2024-04-29 ASSESSMENT — PAIN SCALES - GENERAL
PAINLEVEL_OUTOF10: 2
PAINLEVEL_OUTOF10: 0 - NO PAIN
PAINLEVEL_OUTOF10: 0 - NO PAIN

## 2024-04-29 ASSESSMENT — PAIN DESCRIPTION - LOCATION: LOCATION: HEAD

## 2024-04-29 ASSESSMENT — PAIN DESCRIPTION - DESCRIPTORS: DESCRIPTORS: ACHING

## 2024-04-29 ASSESSMENT — PAIN SCALES - PAIN ASSESSMENT IN ADVANCED DEMENTIA (PAINAD): BREATHING: NORMAL

## 2024-04-29 NOTE — PROGRESS NOTES
"Trever Ellison is a 19 y.o. male on day 4 of admission presenting with ESRD (end stage renal disease) (Multi).    Subjective   Doing well after kidney transplant       Objective   Vitals:    04/29/24 0900   BP: 127/82   Pulse: 110   Resp: 16   Temp: 36.6 °C (97.9 °F)   SpO2: 97%       Physical Exam  Constitutional:       Appearance: Normal appearance.   Eyes:      Pupils: Pupils are equal, round, and reactive to light.   Cardiovascular:      Rate and Rhythm: Normal rate.   Pulmonary:      Effort: Pulmonary effort is normal. No respiratory distress.   Abdominal:      General: There is no distension.      Palpations: Abdomen is soft.      Comments: Incision clean, dry, intact   Musculoskeletal:         General: Normal range of motion.      Right lower leg: No edema.      Left lower leg: No edema.   Skin:     General: Skin is warm and dry.   Neurological:      General: No focal deficit present.      Mental Status: He is alert and oriented to person, place, and time.   Psychiatric:         Mood and Affect: Mood normal.         Behavior: Behavior normal.         Last Recorded Vitals  Blood pressure 127/82, pulse 110, temperature 36.6 °C (97.9 °F), temperature source Temporal, resp. rate 16, height 1.676 m (5' 5.98\"), weight 49.8 kg (109 lb 11.2 oz), SpO2 97%.  Intake/Output last 3 Shifts:  I/O last 3 completed shifts:  In: 720 (14.5 mL/kg) [P.O.:480; I.V.:240 (4.8 mL/kg)]  Out: 4050 (81.4 mL/kg) [Urine:4050 (2.3 mL/kg/hr)]  Weight: 49.8 kg     Relevant Results  Lab Results   Component Value Date    WBC 3.5 (L) 04/29/2024    HGB 9.2 (L) 04/29/2024    HCT 29.3 (L) 04/29/2024    MCV 95 04/29/2024    PLT 91 (L) 04/29/2024     Lab Results   Component Value Date    GLUCOSE 88 04/29/2024    CALCIUM 8.2 (L) 04/29/2024     (H) 04/29/2024    K 3.7 04/29/2024    CO2 26 04/29/2024     (H) 04/29/2024    BUN 22 04/29/2024    CREATININE 1.25 04/29/2024     acyclovir, 400 mg, oral, q12h ROBERTO  clotrimazole, 10 mg, oral, TID with " meals  darbepoetin rod, 40 mcg, subcutaneous, q14 days  heparin (porcine), 5,000 Units, subcutaneous, q8h  iron sucrose, 300 mg, intravenous, Every other day  mycophenolate, 1,000 mg, oral, q12h  pantoprazole, 40 mg, oral, BID AC  potassium phosphate (monobasic), 500 mg, oral, 4x daily  predniSONE, 20 mg, oral, Daily  sennosides, 1 tablet, oral, BID  sulfamethoxazole-trimethoprim, 80 mg, oral, Daily  tacrolimus, 2 mg, oral, q12h          Assessment/Plan   Principal Problem:    ESRD (end stage renal disease) (Multi)  Active Problems:    Kidney replaced by transplant ([facility])    LRKT from father 4/25/2024    Kidney allograft function  Excellent, down trending creatinine  Monitor mild hypernatremia, hydrating better    Immunosuppression   Thymo 4.5mg/kg, completed   Tac 2/2   MMF 1000/1000   Pred taper    Crohn's disease   Re-assess for needs of additional biologic agents outpatient, has GI follow-up    Discharge home today    RTC 1 week    I spent 35 minutes in the professional and overall care of this patient.      Aravind Salas MD

## 2024-04-29 NOTE — TELEPHONE ENCOUNTER
Leodan Salas,      Home Care received a home care referral for Nursing from Kacey Lau [facility]. The referral indicated that you would follow for home care. We are reaching out to confirm that you are agreeable to follow this patient for home care and sign relevant orders for this patient's ongoing home care needs.     Will you follow this patient for home care?     If yes, for your awareness, you will be receiving the orders to sign via [employer] in [facility].     Please treat this message as an urgent request, as we are unable to process the referral or provide home care to the patient until we have a provider agreeable to follow. We appreciate your time and commitment to quality, safe patient care.    Thank you,  [facility] Intake

## 2024-04-29 NOTE — PROGRESS NOTES
Pharmacist Transplant Discharge Note    Medications for Trever Ellison were reviewed in conjunction with the multidisciplinary transplant team. The pharmacist is in agreement with the plan of care for medications at this time.     Medication education was completed with this patient and his mother earlier today. His finalized list of discharge medications was reviewed. An updated outpatient dosing schedule was provided to the patient and his mother. All questions were answered to the patient's satisfaction, and the patient and his mother confirmed understanding.    The patient had his medications filled at [facility] and delivered prior to discharge. Further educational reinforcement should be provided in the outpatient clinic.    Manas Quesada, PharmD, BCPS, BCTXP  Solid Organ Transplant Clinical Pharmacy Specialist

## 2024-04-29 NOTE — PROGRESS NOTES
"Transplant Nephrology progress note     Date of admission: 4/25/2024     Trever Ellison is a 19 y.o.  with [facility]   Past Medical History:   Diagnosis Date    Acute Crohn's disease (Multi)     colitis    Aluminum bone disease     Anemia     Angina pectoris (CMS-HCC)     Cachexia (Multi)     End stage chronic kidney disease (Multi)     Dialysis    Hemodialysis patient (CMS-HCC)     Hydronephrosis     1 yo    Nephritis     Tachycardia     Vitamin D deficiency         SUBJECTIVE:  No acute events overnight.     PROBLEM LIST:  Principal Problem:    ESRD (end stage renal disease) (Multi)  Active Problems:    Kidney replaced by transplant ([facility])         ALLERGIES:  Allergies   Allergen Reactions    Cephalexin Rash    Methotrexate Headache, Other and Unknown     \"Did not tolerate well\", per mom. \" He would get sick, it caused nausea and headaches.\"    Adhesive Tape-Silicones Rash     Redness, raw skin            CURRENT MEDICATIONS:  Scheduled medications  acyclovir, 400 mg, oral, q12h ROBERTO  clotrimazole, 10 mg, oral, TID with meals  darbepoetin rod, 40 mcg, subcutaneous, q14 days  heparin (porcine), 5,000 Units, subcutaneous, q8h  iron sucrose, 300 mg, intravenous, Every other day  mycophenolate, 1,000 mg, oral, q12h  pantoprazole, 40 mg, oral, BID AC  potassium phosphate (monobasic), 500 mg, oral, 4x daily  predniSONE, 20 mg, oral, Daily  sennosides, 1 tablet, oral, BID  sulfamethoxazole-trimethoprim, 80 mg, oral, Daily  tacrolimus, 2 mg, oral, q12h      Continuous medications       PRN medications  PRN medications: acetaminophen, naloxone, ondansetron ODT **OR** ondansetron, oxygen, polyethylene glycol       OBJECTIVE:    VITALS: Visit Vitals  /82 (BP Location: Left arm, Patient Position: Lying)   Pulse 110   Temp 36.6 °C (97.9 °F) (Temporal)   Resp 16   Ht 1.676 m (5' 5.98\")   Wt 49.8 kg (109 lb 11.2 oz)   SpO2 97%   BMI 17.71 kg/m²   Smoking Status Never   BSA 1.52 m²        General: No distress   RESP:  Lungs " clear to auscultation   ABDO: Soft surgical site look clean with no drainage  Neuro: A + O x 3  Skin: No rash   Extremities: No edema       LABS:  Results from last 72 hours   Lab Units 04/29/24  0543   WBC AUTO x10*3/uL 3.5*   HEMOGLOBIN g/dL 9.2*   MCV fL 95   PLATELETS AUTO x10*3/uL 91*   BUN mg/dL 22   CREATININE mg/dL 1.25   CALCIUM mg/dL 8.2*   TACROLIMUS ng/mL 6.3            Intake/Output Summary (Last 24 hours) at 4/29/2024 1113  Last data filed at 4/28/2024 1723  Gross per 24 hour   Intake 360 ml   Output 2150 ml   Net -1790 ml          ASSESSMENT AND PLAN:    Trever Ellison is a 19 y.o. with a history of congenital hydronephrosis and end-stage renal disease secondary to chronic Remicade use secondary to chron's disease.  Currently admitted for living related kidney transplant done on 4/25/2024.    Kidney info: Living related transplant, PRA 0%, HCV D-/are minus, CMV D-/R-, EBV D+/R-, HBV D-/R-.    Graft function: S/p a living transplant on 4/25/2024  -Spontaneous graft function with a downtrending creatinine  -Unremarkable ultrasound.  -Mild hypernatremia. Encouraged water intake. Otherwise metabolic parameters acceptable with plan for discharge today.     Immunosuppression:  -Thymoglobulin induction with 4.5 mg/kg completed.  -Will be maintained on tacrolimus plus MMF plus prednisone    Hemodynamics:  -Blood pressures well-controlled.    Infectious prophylaxis:  -Acyclovir, Bactrim, clotrimazole    Bone mineral disease  -Phos levels remain low- continue K phos tab    Anemia/leukopenia  -Aranesp 40 mcg every other week  -Venofer load given         Gerri Dalal DO  PGY 4 Nephrology Fellow

## 2024-04-29 NOTE — CARE PLAN
The patient's goals for the shift include      The clinical goals for the shift include Pt will increase mobility      Problem: Pain  Goal: My pain/discomfort is manageable  Outcome: Progressing     Problem: Safety  Goal: Patient will be injury free during hospitalization  Outcome: Progressing  Goal: I will remain free of falls  Outcome: Progressing     Problem: Daily Care  Goal: Daily care needs are met  Outcome: Progressing     Problem: Psychosocial Needs  Goal: Demonstrates ability to cope with hospitalization/illness  Outcome: Progressing  Goal: Collaborate with me, my family, and caregiver to identify my specific goals  Outcome: Progressing     Problem: Discharge Barriers  Goal: My discharge needs are met  Outcome: Progressing

## 2024-04-29 NOTE — NURSING NOTE
Discharge instructions reviewed with patient and family. All questions and concerns addressed. Patient denied any questions and demonstrated understanding. All belongings with patient, IV removed w/o difficulty. Pt transported to [facility] lobby via wheelchair.

## 2024-04-29 NOTE — DISCHARGE SUMMARY
Discharge Diagnosis  ESRD (end stage renal disease) (Multi)    Issues Requiring Follow-Up  LDKT 4/25/24  Cr 1.25, BUN 22, UOP 2.6L  PD cath removed    Immunosuppression  Tacrolimus, mycophenolate, prednisone    Disposition   Home with renal telehealth    Test Results Pending At Discharge  Pending Labs       No current pending labs.            Hospital Course  Pt is a 20 y/o male with a hx of ESRD secondary to Ramicade for treatment of Crohn's disease whom received a living donor kidney transplant on 4/25/24 by Dr. Williamson with a PRA of 0.  HCV -/- and has not met risk factors. EBV +/-. Left donor kidney transplanted to patient right pelvis. Admission weight is 43.7 kg (discharge weight is 48.8 kg ).  Pt received a total of 4.5 mg/kg total of thymoglobulin induction therapy in conjunction with 500mg IV solumedrol.  Pt was initiated on Tacrolimus & Cellcept immunosuppression in conjunction with IV solumedrol taper.  Pt was started on acyclovir (CMV D - / R - ) and bactrim for CMV & PCP prophylaxis per protocol. He was started on clotrimazole as antifungal coverage per protocol. Operative course was uncomplicated.  Hospital course was uncomplicated. Jean Baptiste catheter was removed on POD #3 and the patient is voiding spontaneously. Pt did not require dialysis and electrolytes remain stable. Dialysis access via PD cath, which was removed in OR. BP & glucose under good control.    Pt is tolerating a regular diet, maintaining adequate hydration with oral intake, ambulating independently and having BMs. Immunosuppression was managed by the transplant team. Patient is in stable condition for discharge home with renal telehealth today and homecare for drain. RX delivered to bedside prior to discharge. The patient will f/u in the transplant institute and have labs drawn in the walk-in clinic.     Pertinent Physical Exam At Time of Discharge  Physical Exam  Vitals reviewed.   HENT:      Head: Normocephalic.   Cardiovascular:      Rate  and Rhythm: Normal rate.      Pulses: Normal pulses.   Pulmonary:      Effort: Pulmonary effort is normal.   Abdominal:      General: Abdomen is flat.      Palpations: Abdomen is soft.   Musculoskeletal:         General: Normal range of motion.      Cervical back: Normal range of motion.   Skin:     General: Skin is warm and dry.      Comments: RLQ incision c/d/I    Neurological:      Mental Status: He is alert and oriented to person, place, and time. Mental status is at baseline.   Psychiatric:         Mood and Affect: Mood normal.         Behavior: Behavior normal.         Home Medications     Medication List      START taking these medications     acyclovir 400 mg tablet; Commonly known as: Zovirax; Take 1 tablet (400   mg) by mouth every 12 hours.   clotrimazole 10 mg bernard; Commonly known as: Mycelex; Take 1 tablet (10   mg) by mouth 3 times a day with meals.   docusate sodium 100 mg capsule; Commonly known as: Colace; Take 1   capsule (100 mg) by mouth 2 times a day as needed for constipation.   FeroSuL tablet; Generic drug: ferrous sulfate (325 mg ferrous sulfate);   Take 1 tablet by mouth once daily with breakfast.   K-Phos Original 500 mg tablet; Generic drug: potassium phosphate   (monobasic); Take 1 tablet (500 mg) by mouth 2 times a day.   mycophenolate 250 mg capsule; Commonly known as: Cellcept; Take 4   capsules (1,000 mg) by mouth every 12 hours.   pantoprazole 40 mg EC tablet; Commonly known as: ProtoNix; Take 1 tablet   (40 mg) by mouth once daily. Do not crush, chew, or split.   * polyethylene glycol 17 gram/dose powder; Commonly known as: Glycolax,   Miralax; Take 17 g by mouth once daily as needed (constipation).   * polyethylene glycol 17 gram packet; Commonly known as: Glycolax,   Miralax; Take 17 g by mouth once daily.   predniSONE 5 mg tablet; Commonly known as: Deltasone; Take 4 tablets (20   mg) by mouth once daily.   sulfamethoxazole-trimethoprim 400-80 mg tablet; Commonly known as:    Bactrim; Take 1 tablet by mouth once daily.   * tacrolimus 1 mg capsule; Commonly known as: Prograf; Take 2 capsules   (2 mg) by mouth every 12 hours.   * tacrolimus 0.5 mg capsule; Commonly known as: Prograf; Take 1 capsule   (0.5 mg) by mouth 2 times a day.   traMADol 50 mg tablet; Commonly known as: Ultram; Take 1 tablet (50 mg)   by mouth every 6 hours if needed for severe pain (7 - 10) or moderate pain   (4 - 6) for up to 5 days.  * This list has 4 medication(s) that are the same as other medications   prescribed for you. Read the directions carefully, and ask your doctor or   other care provider to review them with you.     CONTINUE taking these medications     acetaminophen 325 mg capsule; Commonly known as: Tylenol   amitriptyline 10 mg tablet; Commonly known as: Elavil; TAKE 1 TABLET (10   MG) BY MOUTH ONCE DAILY AT BEDTIME.     ASK your doctor about these medications     calcitriol 0.25 mcg capsule; Commonly known as: Rocaltrol   cholecalciferol 50 mcg (2,000 unit) capsule; Commonly known as: Vitamin   D-3   Entyvio 300 mg injection; Generic drug: vedolizumab   Fiber (psyllium husk-sugar) 3.4 gram/12 gram powder; Generic drug:   psyllium husk (with sugar)   gentamicin 0.1 % cream; Commonly known as: Garamycin   MIRCERA INJ   sevelamer HCl 800 mg tablet; Commonly known as: Renagel       Outpatient Follow-Up  Future Appointments   Date Time Provider Department Center   5/17/2024 11:50 AM MD RENETTA CastroANABEL Quinonez-CNP

## 2024-04-29 NOTE — PROGRESS NOTES
"Mr. Ellison received a kidney transplant on 4/25/2024. SW reviewed the pre transplant Social Work evaluation as well as interdisciplinary notes of this admission. Attended transplant interdisciplinary rounds. SW reviewed, participated and is in agreement with MDT Plan of Care. I met with patient who was awake, alert, oriented and able to discuss their condition.    Functional/Medical Status: SW met with Pt and Pt's mother, Ginger, at the bedside. Pt was laying in bed during the visit. Pt reported he has been eating and drinking well. Pt shared he has been ambulating.   Dialysis start date: May 2023  Adaption to the Stress of Transplant: Pt denied any concerns related to transplant at this time.  Mental Health/Substance use: Pt reported his mood as \"alright.\" Pt shared he is excited to go home. Pt denied any recent tobacco, alcohol, or illicit drug use. SW provided MH resources Pt's mother requested for Pt at last visit.   Post Discharge Supports/Recovery Plan: Pt reported his primary support remains his mother, Ginger, and his secondary support remains his brother, Tapan. Pt's mother shared she will be bringing Pt home and Pt's brother will be staying with him once home.   Benefits: [employer]    Impressions: SW met with Pt and Pt's mother, Ginger, at the bedside. Pt was laying in bed during the visit. Pt reported he has been eating and drinking well. Pt shared he has been ambulating. Pt denied any concerns related to transplant at this time. Pt reported his mood as \"alright.\" Pt shared he is excited to go home. Pt denied any recent tobacco, alcohol, or illicit drug use. MAYANK provided MH resources Pt's mother requested for Pt at last visit. Pt reported his primary support remains his mother, Ginger, and his secondary support remains his brother, Tapan. Pt's mother shared she will be bringing Pt home and Pt's brother will be staying with him once home. Patient and family/support system are in agreement and report understanding " of their treatment plan. They were provided an opportunity to ask questions, though denied any issues or concerns at this time.        PLAN:  We reviewed the importance of having lab work done twice a week or as directed; scheduled post-transplant appointments; reporting alarming symptoms; restrictions of activities and importance of adherence to medical regimen. We also discussed the precautions to take due to being immunosuppressed. Patient indicated understanding of this information along with the discharge plan and instructions. Patient was given the opportunity to discuss any issues. Patient was given social work contact information.

## 2024-04-29 NOTE — CARE PLAN
The patient's goals for the shift include      The clinical goals for the shift include Pt will remain HDS this shift

## 2024-04-29 NOTE — PROGRESS NOTES
"Music Therapy Note      Therapy Session  Referral Type: New referral this admission  Visit Type: New visit  Session Start Time: 1326  Session End Time: 1327  Intervention Delivery: In-person  Conflict of Service: Declined treatment  Number of family members present: 1  Family Present for Session: Parent/Guardian  Family Participation: Supportive               Treatment/Interventions       Post-assessment  Total Session Time (min): 1 minutes    Narrative  Assessment Detail: Pt was found seated in his chair with mother at bedside upon MTI arrival. MTI introduced services and pt politely declined, saying he's \"good and going home soon.\"  Follow-up: MTI will continue to follow until discharge.    Education Documentation  No documentation found.          "

## 2024-04-29 NOTE — PROGRESS NOTES
Pharmacist Transplant Education Note    The medications for Trever Ellison were reviewed in conjunction with the multidisciplinary transplant team. The pharmacist is in agreement with the plan of care for medications at this time.     Approximately 30 minutes were spent with this patient providing medication education and reviewing new post-transplant medications. The patient's mother was also present for this education session.    An outpatient dosing schedule was created for all oral medications. This schedule was discussed in detail with the patient, including drug name, use, dose, and the appropriate timing of self-administration (i.e. with or without meals, with or without other medications). Also discussed side effects, drug interactions, and the importance of adherence. Both verbal and written instructions were given to the patient outlining the appropriate use of all current oral medications.     The patient and his mother demonstrated an acceptable understanding of his current medication list. All questions were answered to the patient's satisfaction, and the patient and his mother confirmed understanding.     Further educational reinforcement should be provided in the outpatient clinic.    PAPITO PATEL   Pharmacy Student    Manas Quesada, PharmD, BCPS, BCTXP  Solid Organ Transplant Clinical Pharmacy Specialist

## 2024-04-30 ENCOUNTER — DOCUMENTATION (OUTPATIENT)
Dept: HOME HEALTH SERVICES | Facility: HOME HEALTH | Age: 19
End: 2024-04-30
Payer: COMMERCIAL

## 2024-04-30 ENCOUNTER — TELEPHONE (OUTPATIENT)
Dept: TRANSPLANT | Facility: CLINIC | Age: 19
End: 2024-04-30
Payer: COMMERCIAL

## 2024-04-30 ENCOUNTER — HOME HEALTH ADMISSION (OUTPATIENT)
Dept: HOME HEALTH SERVICES | Facility: HOME HEALTH | Age: 19
End: 2024-04-30
Payer: COMMERCIAL

## 2024-04-30 ENCOUNTER — DOCUMENTATION (OUTPATIENT)
Dept: TRANSPLANT | Facility: HOSPITAL | Age: 19
End: 2024-04-30
Payer: COMMERCIAL

## 2024-04-30 NOTE — TELEPHONE ENCOUNTER
On-Call  - Patient's mom called on-call around 9:30 pm with c/o of potential r. Leg swelling vs l. leg, patient urinating but didn't measure output, also sat a lot throughout the day.  Wasn't d/c'd on lasix. Denies any calf pain.  Advised patient I will follow up in the morning to see if r. Leg is still swollen after elevating it overnight. Patient verb understanding of follow up; later this morning.

## 2024-04-30 NOTE — INDIVIDUALIZED OVERALL PLAN OF CARE NOTE
Clinical Event Note: Transplant Education  Transplant Coordinator Note    Inpatient Discharge Education Provided to patient today. The following topics were reviewed:   signs and symptoms of rejection   signs and symptoms of infection   transplant medication indications   transplant medication administration   transplant medication side effects   the importance of following post-transplant lab and appointment schedules   Patient verbalized good understanding of all information provided   Patient demonstrated ability to fill pillbox without error or difficulty   Patient successfully completed discharge education test      Provided to patient all Transplant [place] contact information for both during and after hours    Outpatient Plan:  Labs Monday and Thursday, primarily to monitor electrolytes as both phos and K have required repletion  First TI clinic visit Friday 5/3/24   Stent Removal 5/17/24  Monitor closely for GI symptoms/SEs d/t history of Crohn's

## 2024-04-30 NOTE — HH CARE COORDINATION
Home Care received a referral for Nursing. Unfortunately, we are unable to accept and process the referral at this time.    Patients, please reach out to the referring provider or your PCP to assist in obtaining an alternative home care agency and/or guidance to meet your needs.    Providers, please reach out to  Home Care with any questions regarding the declined referral.

## 2024-04-30 NOTE — PROGRESS NOTES
Art Therapy Note    Trever Scot    Therapy Session  Referral Type: New referral this admission  Visit Type: New visit  Session Start Time: 1348  Session End Time: 1353  Intervention Delivery: In-person  Conflict of Service: Declined treatment  Number of family members present: 1  Family Present for Session: Parent/Guardian              Treatment/Interventions       Post-assessment  Total Session Time (min): 5 minutes    Narrative  Assessment Detail: Pt was sitting up in a chair, looking at his phone, with his mother packing, when ATR visited him. Pt declined due to being discharged.  Follow-up: ATR will sign off on this pt, due to being discharged, ATR shared how to contact expressive therapy team if circumstances change.    Education Documentation  No documentation found.

## 2024-04-30 NOTE — Clinical Note
Per 04/30/24 tank/philip Tam [facility]  [id number] Zone IV auth# [id number] valid 04/30/24-04/29/25.

## 2024-04-30 NOTE — TELEPHONE ENCOUNTER
Called patient back, and spoke to patient's mom Ginger, she advised the swelling is better and she can see both is ankles.  The message was from last night.  His weight is down to 106 lbs and he is urinating well. Last night his BP was 115/80, is measuring urine but wasn't sure how much the output this morning was.  Also still needed to take his BP this morning but he did take his morning transplant medications.  Advised patient to continue to monitor and if they have any other concerns to let us know.

## 2024-04-30 NOTE — PROGRESS NOTES
Per 04/30/24 tank/philip Tam [facility]  [id number] Zone IV auth# [id number] valid 04/30/24-04/29/25.

## 2024-05-03 ENCOUNTER — LAB (OUTPATIENT)
Dept: LAB | Facility: LAB | Age: 19
End: 2024-05-03
Payer: COMMERCIAL

## 2024-05-03 ENCOUNTER — OFFICE VISIT (OUTPATIENT)
Dept: TRANSPLANT | Facility: HOSPITAL | Age: 19
End: 2024-05-03
Payer: COMMERCIAL

## 2024-05-03 VITALS
HEART RATE: 86 BPM | OXYGEN SATURATION: 99 % | WEIGHT: 107 LBS | HEIGHT: 66 IN | SYSTOLIC BLOOD PRESSURE: 129 MMHG | BODY MASS INDEX: 17.19 KG/M2 | DIASTOLIC BLOOD PRESSURE: 86 MMHG | TEMPERATURE: 97.3 F

## 2024-05-03 DIAGNOSIS — Z94.0 KIDNEY REPLACED BY TRANSPLANT (HHS-HCC): ICD-10-CM

## 2024-05-03 DIAGNOSIS — Z91.89 AT RISK FOR INFECTION TRANSMITTED FROM DONOR: ICD-10-CM

## 2024-05-03 DIAGNOSIS — D84.9 IMMUNODEFICIENCY (MULTI): Primary | ICD-10-CM

## 2024-05-03 DIAGNOSIS — Z13.89 SCREENING FOR BLOOD OR PROTEIN IN URINE: ICD-10-CM

## 2024-05-03 LAB
ALBUMIN SERPL BCP-MCNC: 3.9 G/DL (ref 3.4–5)
ANION GAP SERPL CALC-SCNC: 13 MMOL/L (ref 10–20)
BUN SERPL-MCNC: 17 MG/DL (ref 6–23)
CALCIUM SERPL-MCNC: 8.6 MG/DL (ref 8.6–10.6)
CHLORIDE SERPL-SCNC: 110 MMOL/L (ref 98–107)
CO2 SERPL-SCNC: 24 MMOL/L (ref 21–32)
CREAT SERPL-MCNC: 1.15 MG/DL (ref 0.5–1.3)
CREAT UR-MCNC: 45.7 MG/DL (ref 20–370)
EGFRCR SERPLBLD CKD-EPI 2021: >90 ML/MIN/1.73M*2
ERYTHROCYTE [DISTWIDTH] IN BLOOD BY AUTOMATED COUNT: 13.2 % (ref 11.5–14.5)
GLUCOSE SERPL-MCNC: 90 MG/DL (ref 74–99)
HCT VFR BLD AUTO: 31.4 % (ref 41–52)
HGB BLD-MCNC: 10 G/DL (ref 13.5–17.5)
MAGNESIUM SERPL-MCNC: 1.88 MG/DL (ref 1.6–2.4)
MCH RBC QN AUTO: 29.9 PG (ref 26–34)
MCHC RBC AUTO-ENTMCNC: 31.8 G/DL (ref 32–36)
MCV RBC AUTO: 94 FL (ref 80–100)
NRBC BLD-RTO: 0 /100 WBCS (ref 0–0)
PHOSPHATE SERPL-MCNC: 3.2 MG/DL (ref 2.5–4.9)
PLATELET # BLD AUTO: 215 X10*3/UL (ref 150–450)
POTASSIUM SERPL-SCNC: 4.1 MMOL/L (ref 3.5–5.3)
PROT UR-ACNC: 22 MG/DL (ref 5–25)
PROT/CREAT UR: 0.48 MG/MG CREAT (ref 0–0.17)
RBC # BLD AUTO: 3.34 X10*6/UL (ref 4.5–5.9)
SODIUM SERPL-SCNC: 143 MMOL/L (ref 136–145)
TACROLIMUS BLD-MCNC: 12.4 NG/ML
WBC # BLD AUTO: 9.2 X10*3/UL (ref 4.4–11.3)

## 2024-05-03 PROCEDURE — 84156 ASSAY OF PROTEIN URINE: CPT

## 2024-05-03 PROCEDURE — 87799 DETECT AGENT NOS DNA QUANT: CPT

## 2024-05-03 PROCEDURE — 83735 ASSAY OF MAGNESIUM: CPT

## 2024-05-03 PROCEDURE — 80197 ASSAY OF TACROLIMUS: CPT

## 2024-05-03 PROCEDURE — 99214 OFFICE O/P EST MOD 30 MIN: CPT | Performed by: STUDENT IN AN ORGANIZED HEALTH CARE EDUCATION/TRAINING PROGRAM

## 2024-05-03 PROCEDURE — 36415 COLL VENOUS BLD VENIPUNCTURE: CPT

## 2024-05-03 PROCEDURE — 3008F BODY MASS INDEX DOCD: CPT | Performed by: STUDENT IN AN ORGANIZED HEALTH CARE EDUCATION/TRAINING PROGRAM

## 2024-05-03 PROCEDURE — 87801 DETECT AGNT MULT DNA AMPLI: CPT

## 2024-05-03 PROCEDURE — 82570 ASSAY OF URINE CREATININE: CPT

## 2024-05-03 PROCEDURE — 85027 COMPLETE CBC AUTOMATED: CPT

## 2024-05-03 PROCEDURE — 80069 RENAL FUNCTION PANEL: CPT

## 2024-05-03 PROCEDURE — 99214 OFFICE O/P EST MOD 30 MIN: CPT | Mod: 24 | Performed by: STUDENT IN AN ORGANIZED HEALTH CARE EDUCATION/TRAINING PROGRAM

## 2024-05-03 RX ORDER — FERROUS SULFATE 325(65) MG
325 TABLET ORAL
Qty: 30 TABLET | Refills: 1 | Status: SHIPPED | OUTPATIENT
Start: 2024-05-03 | End: 2024-07-02

## 2024-05-03 RX ORDER — TACROLIMUS 0.5 MG/1
0.5 CAPSULE ORAL EVERY 12 HOURS
Qty: 60 CAPSULE | Refills: 11 | Status: SHIPPED | OUTPATIENT
Start: 2024-05-03 | End: 2024-05-14 | Stop reason: DRUGHIGH

## 2024-05-03 RX ORDER — PREDNISONE 5 MG/1
20 TABLET ORAL DAILY
Qty: 120 TABLET | Refills: 11 | Status: SHIPPED | OUTPATIENT
Start: 2024-05-03 | End: 2024-05-06 | Stop reason: SDUPTHER

## 2024-05-03 RX ORDER — SULFAMETHOXAZOLE AND TRIMETHOPRIM 400; 80 MG/1; MG/1
1 TABLET ORAL DAILY
Qty: 30 TABLET | Refills: 4 | Status: SHIPPED | OUTPATIENT
Start: 2024-05-03 | End: 2024-09-30

## 2024-05-03 RX ORDER — CLOTRIMAZOLE 10 MG/1
10 LOZENGE ORAL; TOPICAL
Qty: 90 TABLET | Refills: 0 | Status: SHIPPED | OUTPATIENT
Start: 2024-05-03 | End: 2024-07-02

## 2024-05-03 RX ORDER — ACYCLOVIR 400 MG/1
400 TABLET ORAL EVERY 12 HOURS
Qty: 60 TABLET | Refills: 1 | Status: SHIPPED | OUTPATIENT
Start: 2024-05-03 | End: 2024-07-02

## 2024-05-03 RX ORDER — MYCOPHENOLATE MOFETIL 250 MG/1
1000 CAPSULE ORAL EVERY 12 HOURS
Qty: 240 CAPSULE | Refills: 11 | Status: SHIPPED | OUTPATIENT
Start: 2024-05-03 | End: 2024-05-29 | Stop reason: DRUGHIGH

## 2024-05-03 RX ORDER — TACROLIMUS 1 MG/1
2 CAPSULE ORAL EVERY 12 HOURS
Qty: 120 CAPSULE | Refills: 11 | Status: SHIPPED | OUTPATIENT
Start: 2024-05-03 | End: 2024-05-22 | Stop reason: SDUPTHER

## 2024-05-03 NOTE — PATIENT INSTRUCTIONS
Medication Changes:  No changes today, refills sent to  [facility]      Plan:  Next visit on Monday 5/6  Labs today then once a week on Mondays

## 2024-05-03 NOTE — PROGRESS NOTES
Trever Ellison is a 19 y.o. male who underwent LRKT on 4/25/24. Here today for follow-up     Subjective  Recovering well  No acute events       Objective   Vitals:    05/03/24 0847   BP: 129/86   Pulse: 86   Temp: 36.3 °C (97.3 °F)   SpO2: 99%       General: Alert and oriented to time, place and person. Not in distress  ENT: unremarkable  Cardiovascular: Regular rate, normotensive  Respiratory: no signs of labored breathing on room air  Abdomen/ GI: Kidney transplant incision clean intact  Extremity: BLE trace edema -  Skin: no rash     Assessment/Plan      Trever Ellison is a 19 y.o. male who underwent LRKT on 4/25/24.      Graft Function:    Creatinine 1.25, labs pending  Stent removal pending 5/17     Immunosuppression:     Tacrolimus 2.5 bid goal 8-10  Prednisone 20 mg daily   MMF 1000 mg BID       Diuretics/ Electrolytes:  No changes     RTC:             1 week with labs        I saw and examined Trever Ellison this morning in the outpatient clinic.     I spent 35 minutes in the professional and overall care of this patient, including immunosuppression management.     Aravind Salas MD, [facility]  Transplant and Hepatobiliary Surgery

## 2024-05-05 LAB
BKV DNA SERPL NAA+PROBE-LOG#: NORMAL {LOG_COPIES}/ML
CMV DNA SERPL NAA+PROBE-LOG IU: NORMAL {LOG_IU}/ML
EBV DNA SPEC NAA+PROBE-LOG#: NORMAL {LOG_COPIES}/ML
LABORATORY COMMENT REPORT: NOT DETECTED

## 2024-05-06 ENCOUNTER — APPOINTMENT (OUTPATIENT)
Dept: TRANSPLANT | Facility: HOSPITAL | Age: 19
End: 2024-05-06
Payer: COMMERCIAL

## 2024-05-06 ENCOUNTER — OFFICE VISIT (OUTPATIENT)
Dept: TRANSPLANT | Facility: HOSPITAL | Age: 19
End: 2024-05-06
Payer: COMMERCIAL

## 2024-05-06 ENCOUNTER — TELEPHONE (OUTPATIENT)
Dept: TRANSPLANT | Facility: HOSPITAL | Age: 19
End: 2024-05-06
Payer: COMMERCIAL

## 2024-05-06 ENCOUNTER — DOCUMENTATION (OUTPATIENT)
Dept: TRANSPLANT | Facility: HOSPITAL | Age: 19
End: 2024-05-06
Payer: COMMERCIAL

## 2024-05-06 VITALS
BODY MASS INDEX: 17 KG/M2 | TEMPERATURE: 97.3 F | OXYGEN SATURATION: 97 % | HEART RATE: 80 BPM | DIASTOLIC BLOOD PRESSURE: 79 MMHG | SYSTOLIC BLOOD PRESSURE: 122 MMHG | WEIGHT: 105.3 LBS

## 2024-05-06 DIAGNOSIS — Z94.0 KIDNEY REPLACED BY TRANSPLANT (HHS-HCC): ICD-10-CM

## 2024-05-06 LAB
APPEARANCE UR: CLEAR
BILIRUB UR STRIP.AUTO-MCNC: NEGATIVE MG/DL
COLOR UR: COLORLESS
GLUCOSE UR STRIP.AUTO-MCNC: NORMAL MG/DL
HIV1 RNA SERPL DONR QL PROBE AMP: NORMAL
KETONES UR STRIP.AUTO-MCNC: NEGATIVE MG/DL
LEUKOCYTE ESTERASE UR QL STRIP.AUTO: NEGATIVE
NITRITE UR QL STRIP.AUTO: NEGATIVE
PH UR STRIP.AUTO: 6.5 [PH]
PROT UR STRIP.AUTO-MCNC: NEGATIVE MG/DL
RBC # UR STRIP.AUTO: ABNORMAL /UL
RBC #/AREA URNS AUTO: NORMAL /HPF
SP GR UR STRIP.AUTO: 1.01
UROBILINOGEN UR STRIP.AUTO-MCNC: NORMAL MG/DL
WBC #/AREA URNS AUTO: NORMAL /HPF

## 2024-05-06 PROCEDURE — 81001 URINALYSIS AUTO W/SCOPE: CPT | Performed by: SURGERY

## 2024-05-06 PROCEDURE — 3008F BODY MASS INDEX DOCD: CPT | Performed by: SURGERY

## 2024-05-06 PROCEDURE — 99214 OFFICE O/P EST MOD 30 MIN: CPT | Performed by: SURGERY

## 2024-05-06 RX ORDER — PREDNISONE 5 MG/1
15 TABLET ORAL DAILY
Qty: 90 TABLET | Refills: 11 | Status: CANCELLED | OUTPATIENT
Start: 2024-05-06 | End: 2025-05-06

## 2024-05-06 RX ORDER — PREDNISONE 5 MG/1
15 TABLET ORAL DAILY
Qty: 90 TABLET | Refills: 11 | Status: SHIPPED | OUTPATIENT
Start: 2024-05-06 | End: 2024-05-21 | Stop reason: DRUGHIGH

## 2024-05-06 ASSESSMENT — PAIN SCALES - GENERAL: PAINLEVEL: 0-NO PAIN

## 2024-05-06 NOTE — PATIENT INSTRUCTIONS
Decrease prednisone 15 mg (3 tablets) daily  We will check your urine for any signs of infection - if there is, I will let you know and we will start you on an antibiotic   Continue to check your vital signs/intake/output at home   Labs Tuesday/Friday this week  RTC 1 week on Monday

## 2024-05-06 NOTE — PROGRESS NOTES
Trever Ellison is a 19 y.o. male POD#11 from DDKT from a LDKT.     - Doing very well; excellent graft function  - Good appetite, no constipation or diarrhea  - Mild dysuria    Objective   Gen: A+OX3; NAD  HEENT: PERRL, sclera anicteric, MMM  Cardiac: RRR  Chest: Normal inspiratory effort  Abdomen: S/NT/ND. Incisions C/D/I.  Ext: No LE edema    Last Recorded Vitals  Visit Vitals  /79   Pulse 80   Temp 36.3 °C (97.3 °F) (Temporal)      Assessment/Plan   Principal Problem:    Kidney transplant recipient  Active Problems:  Patient Active Problem List   Diagnosis    Crohn's disease (Multi)    Interstitial nephritis determined by biopsy    ESRD on peritoneal dialysis (Multi)    Pulmonary granuloma (Multi)    Granulomatous colitis (Multi)    Acute diarrhea    Crohn's disease of both small and large intestine (Multi)    Fistula, anal    Adverse effect of cephalosporins and other beta-lactam antibiotics, subsequent encounter    Aluminum bone disease    Anemia in chronic kidney disease    Breakdown (mechanical) of intraperitoneal dialysis catheter, initial encounter (CMS-[facility])    Cachexia (Multi)    Decreased bone density    Acute renal failure superimposed on stage 4 chronic kidney disease (Multi)    ESRD (end stage renal disease) on dialysis (Multi)    ESRD needing dialysis (Multi)    Generalized abdominal pain    Hydronephrosis    Immunosuppression (Multi)    Iron deficiency anemia, unspecified    Kidney failure    Lesion of lung    Nausea and vomiting    Other specified noninfective gastroenteritis and colitis    Vitamin D deficiency    Unspecified severe protein-calorie malnutrition (Multi)    Transplant    Tachycardia    Short stature (child)    Secondary hyperparathyroidism of renal origin (Multi)    Renal dysfunction    Peritoneal dialysis catheter in place (CMS-[facility])    Dependence on renal dialysis (CMS-[facility])    Perianal Crohn's disease (Multi)    Hypokalemia    Shortness of breath    ESRD (end stage renal  disease) (Multi)    Kidney replaced by transplant ([facility])      - Decrease prednisone to 15 mg  - Check UA micro  - Reviewed lifting restrictions  - Labs Mon/Thurs  - Weekly clinic visit    I spent 30 minutes in the professional and overall care of this patient, including immunosuppression management.    Erin Calderon MD, PHD, FACS  Chief Transplant and Hepatobiliary Surgery

## 2024-05-06 NOTE — PROGRESS NOTES
Spoke to patient & his Parents today via the phone re ESRD Medicare & emailed the information to them.  They are aware of the Medicare part B Immuno for Life Benefit.

## 2024-05-07 ENCOUNTER — APPOINTMENT (OUTPATIENT)
Dept: TRANSPLANT | Facility: HOSPITAL | Age: 19
End: 2024-05-07
Payer: COMMERCIAL

## 2024-05-07 ENCOUNTER — LAB (OUTPATIENT)
Dept: LAB | Facility: LAB | Age: 19
End: 2024-05-07
Payer: COMMERCIAL

## 2024-05-07 DIAGNOSIS — Z94.0 KIDNEY REPLACED BY TRANSPLANT (HHS-HCC): ICD-10-CM

## 2024-05-07 LAB
ALBUMIN SERPL BCP-MCNC: 4.1 G/DL (ref 3.4–5)
ANION GAP SERPL CALC-SCNC: 14 MMOL/L (ref 10–20)
BUN SERPL-MCNC: 18 MG/DL (ref 6–23)
CALCIUM SERPL-MCNC: 8.9 MG/DL (ref 8.6–10.6)
CHLORIDE SERPL-SCNC: 109 MMOL/L (ref 98–107)
CO2 SERPL-SCNC: 24 MMOL/L (ref 21–32)
CREAT SERPL-MCNC: 1.12 MG/DL (ref 0.5–1.3)
EGFRCR SERPLBLD CKD-EPI 2021: >90 ML/MIN/1.73M*2
ERYTHROCYTE [DISTWIDTH] IN BLOOD BY AUTOMATED COUNT: 14.5 % (ref 11.5–14.5)
GLUCOSE SERPL-MCNC: 76 MG/DL (ref 74–99)
HCT VFR BLD AUTO: 36.7 % (ref 41–52)
HGB BLD-MCNC: 11.1 G/DL (ref 13.5–17.5)
MAGNESIUM SERPL-MCNC: 1.75 MG/DL (ref 1.6–2.4)
MCH RBC QN AUTO: 29.3 PG (ref 26–34)
MCHC RBC AUTO-ENTMCNC: 30.2 G/DL (ref 32–36)
MCV RBC AUTO: 97 FL (ref 80–100)
NRBC BLD-RTO: 0 /100 WBCS (ref 0–0)
PHOSPHATE SERPL-MCNC: 4.3 MG/DL (ref 2.5–4.9)
PLATELET # BLD AUTO: 273 X10*3/UL (ref 150–450)
POTASSIUM SERPL-SCNC: 4.8 MMOL/L (ref 3.5–5.3)
RBC # BLD AUTO: 3.79 X10*6/UL (ref 4.5–5.9)
SODIUM SERPL-SCNC: 142 MMOL/L (ref 136–145)
TACROLIMUS BLD-MCNC: 10.6 NG/ML
WBC # BLD AUTO: 13.7 X10*3/UL (ref 4.4–11.3)

## 2024-05-07 PROCEDURE — 85027 COMPLETE CBC AUTOMATED: CPT

## 2024-05-07 PROCEDURE — 36415 COLL VENOUS BLD VENIPUNCTURE: CPT

## 2024-05-07 PROCEDURE — 80069 RENAL FUNCTION PANEL: CPT

## 2024-05-07 PROCEDURE — 80197 ASSAY OF TACROLIMUS: CPT

## 2024-05-07 PROCEDURE — 83735 ASSAY OF MAGNESIUM: CPT

## 2024-05-07 NOTE — TELEPHONE ENCOUNTER
Pt's brother (Tapan) FMLA completed and signed by Dr Williamson.  Pt's mothers FMLA is patient portion.    Call placed to patients mom, Tapan is requesting completed FMLA form be emailed to his mother at [email or web address] - email sent.     Ginger is going to forward me her FMLA paperwork to email to complete.

## 2024-05-10 ENCOUNTER — LAB (OUTPATIENT)
Dept: LAB | Facility: LAB | Age: 19
End: 2024-05-10
Payer: COMMERCIAL

## 2024-05-10 ENCOUNTER — TELEPHONE (OUTPATIENT)
Dept: TRANSPLANT | Facility: HOSPITAL | Age: 19
End: 2024-05-10

## 2024-05-10 DIAGNOSIS — Z94.0 KIDNEY REPLACED BY TRANSPLANT (HHS-HCC): ICD-10-CM

## 2024-05-10 LAB
ALBUMIN SERPL BCP-MCNC: 4.1 G/DL (ref 3.4–5)
ANION GAP SERPL CALC-SCNC: 13 MMOL/L (ref 10–20)
BUN SERPL-MCNC: 30 MG/DL (ref 6–23)
CALCIUM SERPL-MCNC: 9.3 MG/DL (ref 8.6–10.6)
CHLORIDE SERPL-SCNC: 107 MMOL/L (ref 98–107)
CO2 SERPL-SCNC: 25 MMOL/L (ref 21–32)
CREAT SERPL-MCNC: 1.28 MG/DL (ref 0.5–1.3)
EGFRCR SERPLBLD CKD-EPI 2021: 82 ML/MIN/1.73M*2
ERYTHROCYTE [DISTWIDTH] IN BLOOD BY AUTOMATED COUNT: 14.6 % (ref 11.5–14.5)
GLUCOSE SERPL-MCNC: 81 MG/DL (ref 74–99)
HCT VFR BLD AUTO: 35.6 % (ref 41–52)
HGB BLD-MCNC: 11.2 G/DL (ref 13.5–17.5)
MAGNESIUM SERPL-MCNC: 1.86 MG/DL (ref 1.6–2.4)
MCH RBC QN AUTO: 30.4 PG (ref 26–34)
MCHC RBC AUTO-ENTMCNC: 31.5 G/DL (ref 32–36)
MCV RBC AUTO: 97 FL (ref 80–100)
NRBC BLD-RTO: 0 /100 WBCS (ref 0–0)
PHOSPHATE SERPL-MCNC: 4.3 MG/DL (ref 2.5–4.9)
PLATELET # BLD AUTO: 294 X10*3/UL (ref 150–450)
POTASSIUM SERPL-SCNC: 5.1 MMOL/L (ref 3.5–5.3)
RBC # BLD AUTO: 3.68 X10*6/UL (ref 4.5–5.9)
SODIUM SERPL-SCNC: 140 MMOL/L (ref 136–145)
WBC # BLD AUTO: 14.8 X10*3/UL (ref 4.4–11.3)

## 2024-05-10 PROCEDURE — 83735 ASSAY OF MAGNESIUM: CPT

## 2024-05-10 PROCEDURE — 80069 RENAL FUNCTION PANEL: CPT

## 2024-05-10 PROCEDURE — 80197 ASSAY OF TACROLIMUS: CPT

## 2024-05-10 PROCEDURE — 36415 COLL VENOUS BLD VENIPUNCTURE: CPT

## 2024-05-10 PROCEDURE — 85027 COMPLETE CBC AUTOMATED: CPT

## 2024-05-10 NOTE — TELEPHONE ENCOUNTER
Completed FMLA was emailed to Ginger yesterday. Sent a [employer] message to update her and let me know if there are any additional issues.

## 2024-05-11 LAB — TACROLIMUS BLD-MCNC: 12.8 NG/ML

## 2024-05-13 ENCOUNTER — NUTRITION (OUTPATIENT)
Dept: TRANSPLANT | Facility: HOSPITAL | Age: 19
End: 2024-05-13
Payer: COMMERCIAL

## 2024-05-13 PROCEDURE — RXMED WILLOW AMBULATORY MEDICATION CHARGE

## 2024-05-13 NOTE — PROGRESS NOTES
Reason for Nutrition Visit:  Pt is a 19 y.o. male referred for MNT post status KT on 4/25/2024.    Past Medical Hx  Patient Active Problem List   Diagnosis    Crohn's disease (Multi)    Interstitial nephritis determined by biopsy    ESRD on peritoneal dialysis (Multi)    Pulmonary granuloma (Multi)    Granulomatous colitis (Multi)    Acute diarrhea    Crohn's disease of both small and large intestine (Multi)    Fistula, anal    Adverse effect of cephalosporins and other beta-lactam antibiotics, subsequent encounter    Aluminum bone disease    Anemia in chronic kidney disease    Breakdown (mechanical) of intraperitoneal dialysis catheter, initial encounter (CMS-HCC)    Cachexia (Multi)    Decreased bone density    Acute renal failure superimposed on stage 4 chronic kidney disease (Multi)    ESRD (end stage renal disease) on dialysis (Multi)    ESRD needing dialysis (Multi)    Generalized abdominal pain    Hydronephrosis    Immunosuppression (Multi)    Iron deficiency anemia, unspecified    Kidney failure    Lesion of lung    Nausea and vomiting    Other specified noninfective gastroenteritis and colitis    Vitamin D deficiency    Unspecified severe protein-calorie malnutrition (Multi)    Transplant    Tachycardia    Short stature (child)    Secondary hyperparathyroidism of renal origin (Multi)    Renal dysfunction    Peritoneal dialysis catheter in place (CMS-HCC)    Dependence on renal dialysis (CMS-HCC)    Perianal Crohn's disease (Multi)    Hypokalemia    Shortness of breath    ESRD (end stage renal disease) (Multi)    Kidney replaced by transplant ([facility])        Food and Nutrition Hx:  Pt and pt's mom report appetite is really good, eating all his meals and snacks. Pt's mom reports it continues to improve along with his energy levels.     WEIGHT HISTORY  CW: 105# (47.8 kg), pt reports he has gained some healthy weight since transplant, was weighing around 95# prior.   BMI: 17.00 kg/m (underweight)  Weight  change:  Yes putting on healthy weight increasing since transplant  Significant Weight Change: No    Lab Results   Component Value Date    HGBA1C 4.2 04/16/2024    BUN 30 (H) 05/10/2024    PHOS 4.3 05/10/2024    K 5.1 05/10/2024          Food Preparation: Parents/Guardian  Cooking Skills/Barriers: None reported  Grocery Shopping: Guardian/Parent        Allergies: None  Intolerance: None  Appetite: Good  Intake: >75%  GI Symptoms : None   Swallowing Difficulty: No problems with swallowing  Dentition : own      Sleep duration/quality : 7+ hours and continuous sleep      Nutrition Focused Physical Exam:    Performed/Deferred: Deferred due to be being virtual visit    Malnutrition Present: Unable to Determine at this Time    Nutrition Interventions:  We reviewed diet guidelines s/p solid organ transplant including the need for a heart heathy diet as well as food safety concerns. All questions and concerns addressed.      Nutrition Goals:  Nutrition Goals : Compliance with heart healthy and food safety guidelines.     Nutrition Recommendations:  1) None at this time.

## 2024-05-14 ENCOUNTER — OFFICE VISIT (OUTPATIENT)
Dept: TRANSPLANT | Facility: HOSPITAL | Age: 19
End: 2024-05-14
Payer: COMMERCIAL

## 2024-05-14 ENCOUNTER — LAB (OUTPATIENT)
Dept: LAB | Facility: LAB | Age: 19
End: 2024-05-14
Payer: COMMERCIAL

## 2024-05-14 VITALS
BODY MASS INDEX: 17.16 KG/M2 | WEIGHT: 106.3 LBS | OXYGEN SATURATION: 100 % | TEMPERATURE: 97.8 F | HEART RATE: 91 BPM | SYSTOLIC BLOOD PRESSURE: 126 MMHG | DIASTOLIC BLOOD PRESSURE: 82 MMHG

## 2024-05-14 DIAGNOSIS — Z94.0 KIDNEY REPLACED BY TRANSPLANT (HHS-HCC): ICD-10-CM

## 2024-05-14 DIAGNOSIS — Z94.0 KIDNEY REPLACED BY TRANSPLANT (HHS-HCC): Primary | ICD-10-CM

## 2024-05-14 DIAGNOSIS — D84.9 IMMUNODEFICIENCY (MULTI): ICD-10-CM

## 2024-05-14 LAB
ALBUMIN SERPL BCP-MCNC: 4.2 G/DL (ref 3.4–5)
ANION GAP SERPL CALC-SCNC: 10 MMOL/L (ref 10–20)
BUN SERPL-MCNC: 21 MG/DL (ref 6–23)
CALCIUM SERPL-MCNC: 9.3 MG/DL (ref 8.6–10.6)
CHLORIDE SERPL-SCNC: 108 MMOL/L (ref 98–107)
CO2 SERPL-SCNC: 25 MMOL/L (ref 21–32)
CREAT SERPL-MCNC: 1.15 MG/DL (ref 0.5–1.3)
EGFRCR SERPLBLD CKD-EPI 2021: >90 ML/MIN/1.73M*2
ERYTHROCYTE [DISTWIDTH] IN BLOOD BY AUTOMATED COUNT: 14.6 % (ref 11.5–14.5)
GLUCOSE SERPL-MCNC: 76 MG/DL (ref 74–99)
HCT VFR BLD AUTO: 36.9 % (ref 41–52)
HGB BLD-MCNC: 11 G/DL (ref 13.5–17.5)
MAGNESIUM SERPL-MCNC: 1.86 MG/DL (ref 1.6–2.4)
MCH RBC QN AUTO: 29 PG (ref 26–34)
MCHC RBC AUTO-ENTMCNC: 29.8 G/DL (ref 32–36)
MCV RBC AUTO: 97 FL (ref 80–100)
NRBC BLD-RTO: 0 /100 WBCS (ref 0–0)
PHOSPHATE SERPL-MCNC: 4 MG/DL (ref 2.5–4.9)
PLATELET # BLD AUTO: 261 X10*3/UL (ref 150–450)
POTASSIUM SERPL-SCNC: 4.6 MMOL/L (ref 3.5–5.3)
RBC # BLD AUTO: 3.79 X10*6/UL (ref 4.5–5.9)
SODIUM SERPL-SCNC: 138 MMOL/L (ref 136–145)
TACROLIMUS BLD-MCNC: 12.9 NG/ML
WBC # BLD AUTO: 8.6 X10*3/UL (ref 4.4–11.3)

## 2024-05-14 PROCEDURE — 99214 OFFICE O/P EST MOD 30 MIN: CPT | Performed by: STUDENT IN AN ORGANIZED HEALTH CARE EDUCATION/TRAINING PROGRAM

## 2024-05-14 PROCEDURE — 85027 COMPLETE CBC AUTOMATED: CPT

## 2024-05-14 PROCEDURE — 99214 OFFICE O/P EST MOD 30 MIN: CPT | Mod: 24 | Performed by: STUDENT IN AN ORGANIZED HEALTH CARE EDUCATION/TRAINING PROGRAM

## 2024-05-14 PROCEDURE — 36415 COLL VENOUS BLD VENIPUNCTURE: CPT

## 2024-05-14 PROCEDURE — 3008F BODY MASS INDEX DOCD: CPT | Performed by: STUDENT IN AN ORGANIZED HEALTH CARE EDUCATION/TRAINING PROGRAM

## 2024-05-14 PROCEDURE — 83735 ASSAY OF MAGNESIUM: CPT

## 2024-05-14 PROCEDURE — 80197 ASSAY OF TACROLIMUS: CPT

## 2024-05-14 PROCEDURE — 80069 RENAL FUNCTION PANEL: CPT

## 2024-05-14 ASSESSMENT — PAIN SCALES - GENERAL: PAINLEVEL: 0-NO PAIN

## 2024-05-14 NOTE — PATIENT INSTRUCTIONS
Medication Changes:  Decrease tacrolimus to 2mg every 12 hours  Stop K-phos supplement    Plan:  Please let us know if you start feeling ill (fever, chills, extra fatigue, aches, cold symptoms) or any urinary concerns  If your white blood cell count continues to go up, we may give you a week of antibiotics  Labs twice a week  Next clinic appt in 1 week (ideally Tuesday)

## 2024-05-14 NOTE — PROGRESS NOTES
Trever Ellison is a 19 y.o. male who underwent LRKT on 4/25/24. Here today for follow-up      Subjective  Recovering well  No acute events       Objective   Vitals:    05/14/24 0933   BP: 126/82   Pulse: 91   Temp: 36.6 °C (97.8 °F)   SpO2: 100%       General: Alert and oriented to time, place and person. Not in distress  ENT: unremarkable  Cardiovascular: Regular rate, normotensive  Respiratory: no signs of labored breathing on room air  Abdomen/ GI: Kidney transplant incision clean intact  Extremity: BLE trace edema -  Skin: no rash     Assessment/Plan      Trever Ellison is a 19 y.o. male who underwent LRKT on 4/25/24.      Graft Function:    Creatinine 1.28, nimco 1.12  Stent removal pending 5/17     Immunosuppression:     Tacrolimus drop to 2 mg bid, goal 8-10  Prednisone 15 mg, drop to 10 mg next week  MMF 1000 mg BID       Diuretics/ Electrolytes:  No changes  Stop mg and phos supplements    Leukocytosis  Asymptomatic currently - will monitor WBC on labs later today and decide on a week of Antibiotics but UA clean and symptoms including urinary, resp or wound related     RTC:              1 week with labs        I saw and examined Trever Ellison this morning in the outpatient clinic.     I spent 35 minutes in the professional and overall care of this patient, including immunosuppression management.     Arvaind Salas MD, [facility]  Transplant and Hepatobiliary Surgery

## 2024-05-17 ENCOUNTER — PROCEDURE VISIT (OUTPATIENT)
Dept: UROLOGY | Facility: HOSPITAL | Age: 19
End: 2024-05-17
Payer: COMMERCIAL

## 2024-05-17 ENCOUNTER — TELEPHONE (OUTPATIENT)
Dept: TRANSPLANT | Facility: HOSPITAL | Age: 19
End: 2024-05-17
Payer: COMMERCIAL

## 2024-05-17 DIAGNOSIS — Z96.0 RETAINED URETERAL STENT: Primary | ICD-10-CM

## 2024-05-17 DIAGNOSIS — Z94.0 KIDNEY REPLACED BY TRANSPLANT (HHS-HCC): ICD-10-CM

## 2024-05-17 LAB
POC APPEARANCE, URINE: CLEAR
POC BILIRUBIN, URINE: NEGATIVE
POC BLOOD, URINE: ABNORMAL
POC COLOR, URINE: YELLOW
POC GLUCOSE, URINE: NEGATIVE MG/DL
POC KETONES, URINE: NEGATIVE MG/DL
POC LEUKOCYTES, URINE: NEGATIVE
POC NITRITE,URINE: NEGATIVE
POC PH, URINE: 7 PH
POC PROTEIN, URINE: NEGATIVE MG/DL
POC SPECIFIC GRAVITY, URINE: 1.02
POC UROBILINOGEN, URINE: 0.2 EU/DL

## 2024-05-17 PROCEDURE — 99203 OFFICE O/P NEW LOW 30 MIN: CPT | Performed by: UROLOGY

## 2024-05-17 PROCEDURE — 81003 URINALYSIS AUTO W/O SCOPE: CPT | Performed by: UROLOGY

## 2024-05-17 PROCEDURE — 52310 CYSTOSCOPY AND TREATMENT: CPT | Performed by: UROLOGY

## 2024-05-17 PROCEDURE — 99213 OFFICE O/P EST LOW 20 MIN: CPT | Performed by: UROLOGY

## 2024-05-17 NOTE — PROGRESS NOTES
"NPV - Transplant Cysto, stent removal    HISTORY OF PRESENT ILLNESS:   Trever Ellison is a 19 y.o. male who is being seen today for cysto stent removal    underwent LRKT on 4/25/24     PAST MEDICAL HISTORY:  Past Medical History:   Diagnosis Date    Acute Crohn's disease (Multi)     colitis    Aluminum bone disease     Anemia     Angina pectoris (CMS-HCC)     Cachexia (Multi)     End stage chronic kidney disease (Multi)     Dialysis    Hemodialysis patient (CMS-HCC)     Hydronephrosis     1 yo    Nephritis     Tachycardia     Vitamin D deficiency        PAST SURGICAL HISTORY:  Past Surgical History:   Procedure Laterality Date    COLON SURGERY  2014    crohns    COLONOSCOPY      CT GUIDED CHEST TUBE PLACEMENT  06/28/2023    CT GUIDED CHEST TUBE PLACEMENT 6/28/2023 [facility]    CT GUIDED PERCUTANEOUS BIOPSY KIDNEY  2023    CT GUIDED PERCUTANEOUS BIOPSY LUNG  06/28/2023    CT GUIDED PERCUTANEOUS BIOPSY LUNG 6/28/2023 [facility] CT    IR TUNNELED DIALYSIS CATHETER  2023    LUNG BIOPSY  2023    UPPER GASTROINTESTINAL ENDOSCOPY          ALLERGIES:   Allergies   Allergen Reactions    Cephalexin Rash    Methotrexate Headache, Other and Unknown     \"Did not tolerate well\", per mom. \" He would get sick, it caused nausea and headaches.\"    Adhesive Tape-Silicones Rash     Redness, raw skin        MEDICATIONS:   Current Outpatient Medications   Medication Instructions    acetaminophen (TYLENOL) 325 mg, oral, Every 4 hours PRN    acyclovir (ZOVIRAX) 400 mg, oral, Every 12 hours    amitriptyline (ELAVIL) 10 mg, oral, Nightly    clotrimazole (MYCELEX) 10 mg, oral, 3 times daily (morning, midday, late afternoon)    docusate sodium (COLACE) 100 mg, oral, 2 times daily PRN    ferrous sulfate, 325 mg ferrous sulfate, tablet 1 tablet, oral, Daily with breakfast    mycophenolate (CELLCEPT) 1,000 mg, oral, Every 12 hours    pantoprazole (PROTONIX) 40 mg, oral, Daily, Do not crush, chew, or split.    polyethylene glycol (Glycolax, Miralax) 17 " gram/dose powder Take 1 capful (17 g) mixed in 4-8 ounces of liquid by mouth once daily.    predniSONE (DELTASONE) 15 mg, oral, Daily    sulfamethoxazole-trimethoprim (Bactrim) 400-80 mg tablet 1 tablet, oral, Daily    tacrolimus (PROGRAF) 2 mg, oral, Every 12 hours    vedolizumab (ENTYVIO) 300 mg, intravenous, Once        PHYSICAL EXAM:  There were no vitals taken for this visit.  Constitutional: Patient appears well-developed and well-nourished. No distress.    Pulmonary/Chest: Effort normal. No respiratory distress.   Musculoskeletal: Normal range of motion.    Neurological: Alert and oriented to person, place, and time.  Psychiatric: Normal mood and affect. Behavior is normal. Thought content normal.      Labs  Lab Results   Component Value Date    GFRMALE CANCELED 07/16/2023     Lab Results   Component Value Date    CREATININE 1.15 05/14/2024       Procedures  Cystoscopy   After informed consent was obtained, the patient was taken to the procedure room for cystoscopy for ureteral stent removal.    Procedure Note:   A sterile prep and drape was performed in standard fashion. Lidocaine jelly was injected into the urethra. A flexible cystoscope was inserted into the bladder without difficulty.    The ureteral stent was seen and grasped using forceps.  The stent was completely removed.      Post-Procedure:  The cystoscope was removed. The vital signs were stable. The patient tolerated the procedure well. There were no complications.     Assessment:      1. Kidney replaced by transplant ([facility])        2. Retained ureteral stent               Plan:   1)  FU with Transplant  2) Post cysto, stent removal instructions given, warning signs discussed

## 2024-05-17 NOTE — TELEPHONE ENCOUNTER
Returned call to Ginger, only question was regarding labwork that was addressed in [employer] message. NFQ. Pt at urology appt to get stent out.

## 2024-05-21 ENCOUNTER — SPECIALTY PHARMACY (OUTPATIENT)
Dept: PHARMACY | Facility: CLINIC | Age: 19
End: 2024-05-21

## 2024-05-21 ENCOUNTER — LAB (OUTPATIENT)
Dept: LAB | Facility: LAB | Age: 19
End: 2024-05-21
Payer: COMMERCIAL

## 2024-05-21 ENCOUNTER — OFFICE VISIT (OUTPATIENT)
Dept: TRANSPLANT | Facility: HOSPITAL | Age: 19
End: 2024-05-21
Payer: COMMERCIAL

## 2024-05-21 VITALS
WEIGHT: 106.6 LBS | SYSTOLIC BLOOD PRESSURE: 122 MMHG | DIASTOLIC BLOOD PRESSURE: 84 MMHG | HEART RATE: 104 BPM | OXYGEN SATURATION: 97 % | BODY MASS INDEX: 17.21 KG/M2 | TEMPERATURE: 97.3 F

## 2024-05-21 DIAGNOSIS — Z94.0 KIDNEY REPLACED BY TRANSPLANT (HHS-HCC): ICD-10-CM

## 2024-05-21 DIAGNOSIS — D84.9 IMMUNOSUPPRESSION (MULTI): Primary | ICD-10-CM

## 2024-05-21 LAB
ALBUMIN SERPL BCP-MCNC: 4.5 G/DL (ref 3.4–5)
ANION GAP SERPL CALC-SCNC: 16 MMOL/L (ref 10–20)
BUN SERPL-MCNC: 19 MG/DL (ref 6–23)
CALCIUM SERPL-MCNC: 9.4 MG/DL (ref 8.6–10.6)
CHLORIDE SERPL-SCNC: 107 MMOL/L (ref 98–107)
CO2 SERPL-SCNC: 23 MMOL/L (ref 21–32)
CREAT SERPL-MCNC: 1.28 MG/DL (ref 0.5–1.3)
EGFRCR SERPLBLD CKD-EPI 2021: 82 ML/MIN/1.73M*2
ERYTHROCYTE [DISTWIDTH] IN BLOOD BY AUTOMATED COUNT: 14.3 % (ref 11.5–14.5)
GLUCOSE SERPL-MCNC: 83 MG/DL (ref 74–99)
HCT VFR BLD AUTO: 41.3 % (ref 41–52)
HGB BLD-MCNC: 12.5 G/DL (ref 13.5–17.5)
MAGNESIUM SERPL-MCNC: 1.69 MG/DL (ref 1.6–2.4)
MCH RBC QN AUTO: 29.6 PG (ref 26–34)
MCHC RBC AUTO-ENTMCNC: 30.3 G/DL (ref 32–36)
MCV RBC AUTO: 98 FL (ref 80–100)
NRBC BLD-RTO: 0 /100 WBCS (ref 0–0)
PHOSPHATE SERPL-MCNC: 3.5 MG/DL (ref 2.5–4.9)
PLATELET # BLD AUTO: 199 X10*3/UL (ref 150–450)
POTASSIUM SERPL-SCNC: 3.9 MMOL/L (ref 3.5–5.3)
RBC # BLD AUTO: 4.22 X10*6/UL (ref 4.5–5.9)
SODIUM SERPL-SCNC: 142 MMOL/L (ref 136–145)
TACROLIMUS BLD-MCNC: 11.2 NG/ML
WBC # BLD AUTO: 5.4 X10*3/UL (ref 4.4–11.3)

## 2024-05-21 PROCEDURE — 80197 ASSAY OF TACROLIMUS: CPT

## 2024-05-21 PROCEDURE — 3008F BODY MASS INDEX DOCD: CPT | Performed by: TRANSPLANT SURGERY

## 2024-05-21 PROCEDURE — 85027 COMPLETE CBC AUTOMATED: CPT

## 2024-05-21 PROCEDURE — 99214 OFFICE O/P EST MOD 30 MIN: CPT | Mod: 24 | Performed by: TRANSPLANT SURGERY

## 2024-05-21 PROCEDURE — 99214 OFFICE O/P EST MOD 30 MIN: CPT | Performed by: TRANSPLANT SURGERY

## 2024-05-21 PROCEDURE — 36415 COLL VENOUS BLD VENIPUNCTURE: CPT

## 2024-05-21 PROCEDURE — 80069 RENAL FUNCTION PANEL: CPT

## 2024-05-21 PROCEDURE — 83735 ASSAY OF MAGNESIUM: CPT

## 2024-05-21 RX ORDER — PREDNISONE 5 MG/1
10 TABLET ORAL DAILY
Qty: 60 TABLET | Refills: 11 | Status: SHIPPED | OUTPATIENT
Start: 2024-05-21 | End: 2024-06-04 | Stop reason: SDUPTHER

## 2024-05-21 ASSESSMENT — ENCOUNTER SYMPTOMS
COUGH: 0
EYES NEGATIVE: 1
ARTHRALGIAS: 0
HEMATURIA: 0
DIZZINESS: 0
ABDOMINAL PAIN: 0
CONSTIPATION: 0
DYSURIA: 0
DIARRHEA: 0
COLOR CHANGE: 0
FEVER: 0
AGITATION: 0
SHORTNESS OF BREATH: 0
FREQUENCY: 0
CHILLS: 0
HALLUCINATIONS: 0
ADENOPATHY: 0
ABDOMINAL DISTENTION: 0
WEAKNESS: 0
LIGHT-HEADEDNESS: 0
CONFUSION: 0

## 2024-05-21 ASSESSMENT — PAIN SCALES - GENERAL: PAINLEVEL: 0-NO PAIN

## 2024-05-21 NOTE — PROGRESS NOTES
Subjective   Kelvin Ellison is a 19 y.o. male who underwent LRKT on 4/25/2024 (Kidney). Here today for follow-up.    Review of Systems   Constitutional:  Negative for chills and fever.   HENT: Negative.  Negative for congestion.    Eyes: Negative.    Respiratory:  Negative for cough and shortness of breath.    Cardiovascular:  Negative for chest pain.   Gastrointestinal:  Negative for abdominal distention, abdominal pain, constipation and diarrhea.   Endocrine: Negative for cold intolerance and heat intolerance.   Genitourinary:  Negative for dysuria, frequency, hematuria and urgency.   Musculoskeletal:  Negative for arthralgias.   Skin:  Negative for color change.   Allergic/Immunologic: Negative for environmental allergies.   Neurological:  Negative for dizziness, weakness and light-headedness.   Hematological:  Negative for adenopathy.   Psychiatric/Behavioral:  Negative for agitation, confusion and hallucinations.         Objective   There were no vitals filed for this visit.    Physical Exam  Constitutional:       Appearance: Normal appearance.   HENT:      Head: Normocephalic and atraumatic.      Nose: Nose normal.   Eyes:      Pupils: Pupils are equal, round, and reactive to light.   Cardiovascular:      Rate and Rhythm: Normal rate.   Pulmonary:      Effort: Pulmonary effort is normal. No respiratory distress.   Abdominal:      General: There is no distension.      Palpations: Abdomen is soft. There is no mass.      Comments: Incision clean, dry, intact   Musculoskeletal:         General: No swelling. Normal range of motion.      Cervical back: Normal range of motion.      Right lower leg: No edema.      Left lower leg: No edema.   Skin:     General: Skin is warm and dry.   Neurological:      General: No focal deficit present.      Mental Status: He is alert and oriented to person, place, and time.   Psychiatric:         Mood and Affect: Mood normal.         Behavior: Behavior normal.       Lab Results    Component Value Date    CREATININE 1.15 05/14/2024    K 4.6 05/14/2024    GLUCOSE 76 05/14/2024    HCT 36.9 (L) 05/14/2024    WBC 8.6 05/14/2024     05/14/2024    CALCIUM 9.3 05/14/2024     Lab Results   Component Value Date    WBC 8.6 05/14/2024    HGB 11.0 (L) 05/14/2024    HCT 36.9 (L) 05/14/2024    MCV 97 05/14/2024     05/14/2024     Lab Results   Component Value Date    GLUCOSE 76 05/14/2024    CALCIUM 9.3 05/14/2024     05/14/2024    K 4.6 05/14/2024    CO2 25 05/14/2024     (H) 05/14/2024    BUN 21 05/14/2024    CREATININE 1.15 05/14/2024       Current Outpatient Medications:     acetaminophen (Tylenol) 325 mg capsule, Take 1 capsule (325 mg) by mouth every 4 hours if needed., Disp: , Rfl:     acyclovir (Zovirax) 400 mg tablet, Take 1 tablet (400 mg) by mouth every 12 hours., Disp: 60 tablet, Rfl: 1    amitriptyline (Elavil) 10 mg tablet, TAKE 1 TABLET (10 MG) BY MOUTH ONCE DAILY AT BEDTIME., Disp: 90 tablet, Rfl: 4    clotrimazole (Mycelex) 10 mg bernard, Take 1 tablet (10 mg) by mouth 3 times a day with meals., Disp: 90 tablet, Rfl: 0    ferrous sulfate, 325 mg ferrous sulfate, tablet, Take 1 tablet by mouth once daily with breakfast., Disp: 30 tablet, Rfl: 1    mycophenolate (Cellcept) 250 mg capsule, Take 4 capsules (1,000 mg) by mouth every 12 hours., Disp: 240 capsule, Rfl: 11    pantoprazole (ProtoNix) 40 mg EC tablet, Take 1 tablet (40 mg) by mouth once daily. Do not crush, chew, or split., Disp: 30 tablet, Rfl: 0    predniSONE (Deltasone) 5 mg tablet, Take 3 tablets (15 mg) by mouth once daily., Disp: 90 tablet, Rfl: 11    sulfamethoxazole-trimethoprim (Bactrim) 400-80 mg tablet, Take 1 tablet by mouth once daily., Disp: 30 tablet, Rfl: 4    tacrolimus (Prograf) 1 mg capsule, Take 2 capsules (2 mg) by mouth every 12 hours., Disp: 120 capsule, Rfl: 11    vedolizumab (Entyvio) 300 mg injection, Infuse 300 mg into a venous catheter 1 time., Disp: , Rfl:     Assessment/Plan    LRKT 4/25/2024    Kidney allograft function   Good function   Lab pending today    Immunosuppression   Thymo 4.5mg/kg    Tac 2/2   MMF 1000/1000   Pred, decrease to 10mg daily    Crohn's disease   Has GI appt coming up for follow up   Will need to determine what biologic Rx and timing in multidisciplinary setting

## 2024-05-21 NOTE — PATIENT INSTRUCTIONS
Medication Changes:  Decrease prednisone to 10mg (2 tablets) every morning  Other changes will be determined based on lab results later today    Plan:  See GI as planned to discuss entyvio and pantoprazole  Labs today and continue once a week  Next visit in 2 weeks    You're doing great!

## 2024-05-22 ENCOUNTER — SPECIALTY PHARMACY (OUTPATIENT)
Dept: PHARMACY | Facility: CLINIC | Age: 19
End: 2024-05-22

## 2024-05-22 ENCOUNTER — PATIENT MESSAGE (OUTPATIENT)
Dept: TRANSPLANT | Facility: HOSPITAL | Age: 19
End: 2024-05-22
Payer: COMMERCIAL

## 2024-05-22 DIAGNOSIS — Z94.0 KIDNEY REPLACED BY TRANSPLANT (HHS-HCC): ICD-10-CM

## 2024-05-22 RX ORDER — TACROLIMUS 1 MG/1
1 CAPSULE ORAL EVERY 12 HOURS
Qty: 60 CAPSULE | Refills: 11 | Status: SHIPPED | OUTPATIENT
Start: 2024-05-22 | End: 2024-05-29 | Stop reason: SDUPTHER

## 2024-05-22 RX ORDER — TACROLIMUS 0.5 MG/1
0.5 CAPSULE ORAL 2 TIMES DAILY
Qty: 60 CAPSULE | Refills: 11 | Status: SHIPPED | OUTPATIENT
Start: 2024-05-22 | End: 2024-05-29 | Stop reason: SDUPTHER

## 2024-05-23 ENCOUNTER — PHARMACY VISIT (OUTPATIENT)
Dept: PHARMACY | Facility: CLINIC | Age: 19
End: 2024-05-23
Payer: COMMERCIAL

## 2024-05-28 ENCOUNTER — TELEPHONE (OUTPATIENT)
Dept: GASTROENTEROLOGY | Facility: HOSPITAL | Age: 19
End: 2024-05-28
Payer: COMMERCIAL

## 2024-05-28 ENCOUNTER — TELEPHONE (OUTPATIENT)
Dept: TRANSPLANT | Facility: HOSPITAL | Age: 19
End: 2024-05-28
Payer: COMMERCIAL

## 2024-05-28 ENCOUNTER — PHARMACY VISIT (OUTPATIENT)
Dept: PHARMACY | Facility: CLINIC | Age: 19
End: 2024-05-28
Payer: COMMERCIAL

## 2024-05-28 DIAGNOSIS — Z94.0 KIDNEY REPLACED BY TRANSPLANT (HHS-HCC): ICD-10-CM

## 2024-05-28 DIAGNOSIS — R19.7 DIARRHEA, UNSPECIFIED TYPE: ICD-10-CM

## 2024-05-28 PROCEDURE — RXMED WILLOW AMBULATORY MEDICATION CHARGE

## 2024-05-28 RX ORDER — MYCOPHENOLIC ACID 180 MG/1
720 TABLET, DELAYED RELEASE ORAL 2 TIMES DAILY
Qty: 240 TABLET | Refills: 11 | Status: SHIPPED | OUTPATIENT
Start: 2024-05-28 | End: 2024-05-29 | Stop reason: SDUPTHER

## 2024-05-28 NOTE — TELEPHONE ENCOUNTER
Per Dr Williamson - get stool studies, switch to  mg BID.  Pt's mom informed, she will get stool study collection cups when pt get labs tomorrow. MPA sent to [facility], mom needs to call insurance to request an override for [facility] to fill, phone number provided and mom will keep me updated.

## 2024-05-28 NOTE — TELEPHONE ENCOUNTER
Mom lvm, patient has diarrhea and all of his anti rejection meds need to be refilled. [facility] does not have any prescriptions for MMF TAC  and pantoprazole. Doesn't know if he has enough to last tonight

## 2024-05-28 NOTE — TELEPHONE ENCOUNTER
Returned call to Ginger, she reports that Kelvin has had 1 episode daily of loose stool, started a couple of days ago. Hasn't checked temp, pt had stomach ache 2 days ago. No n/v, pt unsure if med related vs chron's flare. Will discuss with MD.    Ginger also reports that pt will be out of tac/MMF after tomorrow morning dose. Email sent to [facility]. Of note, pt has been taking tac 2 mg BID, did not decrease to 1.5 mg BID. Will see repeat labs.

## 2024-05-28 NOTE — TELEPHONE ENCOUNTER
Received notice that tac and MPA were approved for a one time fill at [facility], they will reach out to mom to arrange delivery for tomorrow. Will [facility].

## 2024-05-28 NOTE — TELEPHONE ENCOUNTER
----- Message from Rachel Newton MA sent at 5/28/2024 10:30 AM EDT -----  Regarding: RE: GI issues  Not in the hospital.  Dr. Williamson is transplant, most familiar with the antirejection drugs.    [phone]     ----- Message -----  From: Vipin Hermosillo MD  Sent: 5/28/2024  10:19 AM EDT  To: Rachel Newton MA  Subject: RE: GI issues                                    Can you call her and ask her is kelvin in hospital?  What specialty is [facility]  ----- Message -----  From: Rachel Newton MA  Sent: 5/28/2024  10:06 AM EDT  To: Vipin Hermosillo MD  Subject: GI issues                                        Mom asks that you speak to other docs, Dr. Williamson, because patient has had some loose stools the last few days and she wants to know next step.  [phone]      I discussed with Dr. Williamson the immunosuppressant meds that Kelvin is currently on and he will have his coordinator called to order stool studies and potentially change the formulation of CellCept  I would like to see the patient in July after he is off prednisone to decide on starting his Entyvio.

## 2024-05-28 NOTE — TELEPHONE ENCOUNTER
Pt's GI doctor also reached out to Dr Williamson - send stool studies and switch to Myfortic. Rx pended for MD to sign, updated SP. Will update Ginger once get confirmation from SP.

## 2024-05-29 ENCOUNTER — LAB (OUTPATIENT)
Dept: LAB | Facility: LAB | Age: 19
End: 2024-05-29
Payer: COMMERCIAL

## 2024-05-29 DIAGNOSIS — Z94.0 KIDNEY REPLACED BY TRANSPLANT (HHS-HCC): ICD-10-CM

## 2024-05-29 DIAGNOSIS — R19.7 DIARRHEA, UNSPECIFIED TYPE: ICD-10-CM

## 2024-05-29 LAB
ALBUMIN SERPL BCP-MCNC: 4.9 G/DL (ref 3.4–5)
ANION GAP SERPL CALC-SCNC: 14 MMOL/L (ref 10–20)
BUN SERPL-MCNC: 23 MG/DL (ref 6–23)
CALCIUM SERPL-MCNC: 9.9 MG/DL (ref 8.6–10.6)
CHLORIDE SERPL-SCNC: 108 MMOL/L (ref 98–107)
CO2 SERPL-SCNC: 25 MMOL/L (ref 21–32)
CREAT SERPL-MCNC: 1.23 MG/DL (ref 0.5–1.3)
EGFRCR SERPLBLD CKD-EPI 2021: 86 ML/MIN/1.73M*2
ERYTHROCYTE [DISTWIDTH] IN BLOOD BY AUTOMATED COUNT: 13.5 % (ref 11.5–14.5)
GLUCOSE SERPL-MCNC: 88 MG/DL (ref 74–99)
HCT VFR BLD AUTO: 40.9 % (ref 41–52)
HGB BLD-MCNC: 12.5 G/DL (ref 13.5–17.5)
MAGNESIUM SERPL-MCNC: 1.87 MG/DL (ref 1.6–2.4)
MCH RBC QN AUTO: 28.9 PG (ref 26–34)
MCHC RBC AUTO-ENTMCNC: 30.6 G/DL (ref 32–36)
MCV RBC AUTO: 95 FL (ref 80–100)
NRBC BLD-RTO: 0 /100 WBCS (ref 0–0)
PHOSPHATE SERPL-MCNC: 3.9 MG/DL (ref 2.5–4.9)
PLATELET # BLD AUTO: 203 X10*3/UL (ref 150–450)
POTASSIUM SERPL-SCNC: 5 MMOL/L (ref 3.5–5.3)
RBC # BLD AUTO: 4.32 X10*6/UL (ref 4.5–5.9)
SODIUM SERPL-SCNC: 142 MMOL/L (ref 136–145)
TACROLIMUS BLD-MCNC: 7.4 NG/ML
WBC # BLD AUTO: 6.6 X10*3/UL (ref 4.4–11.3)

## 2024-05-29 PROCEDURE — 87329 GIARDIA AG IA: CPT

## 2024-05-29 PROCEDURE — 80197 ASSAY OF TACROLIMUS: CPT

## 2024-05-29 PROCEDURE — 85027 COMPLETE CBC AUTOMATED: CPT

## 2024-05-29 PROCEDURE — 36415 COLL VENOUS BLD VENIPUNCTURE: CPT

## 2024-05-29 PROCEDURE — 83735 ASSAY OF MAGNESIUM: CPT

## 2024-05-29 PROCEDURE — 80069 RENAL FUNCTION PANEL: CPT

## 2024-05-29 PROCEDURE — 87328 CRYPTOSPORIDIUM AG IA: CPT

## 2024-05-29 PROCEDURE — 87506 IADNA-DNA/RNA PROBE TQ 6-11: CPT

## 2024-05-29 RX ORDER — MYCOPHENOLIC ACID 180 MG/1
720 TABLET, DELAYED RELEASE ORAL 2 TIMES DAILY
Qty: 240 TABLET | Refills: 11 | Status: SHIPPED | OUTPATIENT
Start: 2024-05-29 | End: 2024-06-04 | Stop reason: SDUPTHER

## 2024-05-29 RX ORDER — TACROLIMUS 1 MG/1
1 CAPSULE ORAL EVERY 12 HOURS
Qty: 60 CAPSULE | Refills: 11 | Status: SHIPPED | OUTPATIENT
Start: 2024-05-29 | End: 2024-06-06 | Stop reason: DRUGHIGH

## 2024-05-29 RX ORDER — TACROLIMUS 0.5 MG/1
0.5 CAPSULE ORAL 2 TIMES DAILY
Qty: 60 CAPSULE | Refills: 11 | Status: SHIPPED | OUTPATIENT
Start: 2024-05-29 | End: 2024-06-06 | Stop reason: DRUGHIGH

## 2024-05-30 ENCOUNTER — TELEPHONE (OUTPATIENT)
Dept: TRANSPLANT | Facility: HOSPITAL | Age: 19
End: 2024-05-30
Payer: COMMERCIAL

## 2024-05-30 ENCOUNTER — DOCUMENTATION (OUTPATIENT)
Dept: TRANSPLANT | Facility: HOSPITAL | Age: 19
End: 2024-05-30
Payer: COMMERCIAL

## 2024-05-30 LAB

## 2024-05-30 NOTE — TELEPHONE ENCOUNTER
Returned call to client services, spoke to Veena, c-diff was cancelled d/t specimen too formed to run.

## 2024-05-30 NOTE — PROGRESS NOTES
Faxed 05/21/24 clinic note to Michaela to [phone] per her request.  She is covering for Anel.

## 2024-05-31 ENCOUNTER — TELEPHONE (OUTPATIENT)
Dept: TRANSPLANT | Facility: HOSPITAL | Age: 19
End: 2024-05-31
Payer: COMMERCIAL

## 2024-05-31 NOTE — TELEPHONE ENCOUNTER
Mother is questioning the dosage of patients MMF. Work sheet says one thing and the pill bottom from [facility] says a different amount. Looking to confirm correct dosage

## 2024-05-31 NOTE — TELEPHONE ENCOUNTER
Returned call to Ginger, confirmed that patient changed to Myfortic so correct dose is 720 mg BID.

## 2024-06-02 LAB
CRYPTOSP AG STL QL IA: NEGATIVE
G LAMBLIA AG STL QL IA: NEGATIVE

## 2024-06-03 LAB — O+P STL MICRO: NEGATIVE

## 2024-06-04 ENCOUNTER — TELEPHONE (OUTPATIENT)
Dept: TRANSPLANT | Facility: HOSPITAL | Age: 19
End: 2024-06-04

## 2024-06-04 ENCOUNTER — OFFICE VISIT (OUTPATIENT)
Dept: TRANSPLANT | Facility: HOSPITAL | Age: 19
End: 2024-06-04
Payer: COMMERCIAL

## 2024-06-04 VITALS
DIASTOLIC BLOOD PRESSURE: 84 MMHG | WEIGHT: 104.2 LBS | SYSTOLIC BLOOD PRESSURE: 130 MMHG | BODY MASS INDEX: 16.82 KG/M2 | HEART RATE: 101 BPM | TEMPERATURE: 97.2 F | OXYGEN SATURATION: 95 %

## 2024-06-04 DIAGNOSIS — D84.9 IMMUNODEFICIENCY (MULTI): Primary | ICD-10-CM

## 2024-06-04 DIAGNOSIS — R19.7 DIARRHEA, UNSPECIFIED TYPE: ICD-10-CM

## 2024-06-04 DIAGNOSIS — Z94.0 KIDNEY REPLACED BY TRANSPLANT (HHS-HCC): ICD-10-CM

## 2024-06-04 LAB
FLOW AUTOCROSSMATCH: NORMAL
HLA RESULTS: NORMAL

## 2024-06-04 PROCEDURE — 3008F BODY MASS INDEX DOCD: CPT | Performed by: STUDENT IN AN ORGANIZED HEALTH CARE EDUCATION/TRAINING PROGRAM

## 2024-06-04 PROCEDURE — 99214 OFFICE O/P EST MOD 30 MIN: CPT | Performed by: STUDENT IN AN ORGANIZED HEALTH CARE EDUCATION/TRAINING PROGRAM

## 2024-06-04 PROCEDURE — 99214 OFFICE O/P EST MOD 30 MIN: CPT | Mod: 24 | Performed by: STUDENT IN AN ORGANIZED HEALTH CARE EDUCATION/TRAINING PROGRAM

## 2024-06-04 RX ORDER — PREDNISONE 5 MG/1
5 TABLET ORAL DAILY
Qty: 30 TABLET | Refills: 11 | Status: SHIPPED | OUTPATIENT
Start: 2024-06-04 | End: 2025-06-04

## 2024-06-04 RX ORDER — MYCOPHENOLIC ACID 180 MG/1
540 TABLET, DELAYED RELEASE ORAL 2 TIMES DAILY
Qty: 180 TABLET | Refills: 11 | Status: SHIPPED | OUTPATIENT
Start: 2024-06-04 | End: 2025-06-04

## 2024-06-04 ASSESSMENT — PAIN SCALES - GENERAL: PAINLEVEL: 0-NO PAIN

## 2024-06-04 NOTE — PROGRESS NOTES
Trever Ellison is a 19 y.o. male who underwent LRKT on 4/25/24. Here today for follow-up      Subjective  Recovering well  No acute events       Objective   Vitals:    06/04/24 1209   BP: 130/84   Pulse: 101   Temp: 36.2 °C (97.2 °F)   SpO2: 95%       General: Alert and oriented to time, place and person. Not in distress  ENT: unremarkable  Cardiovascular: Regular rate, normotensive  Respiratory: no signs of labored breathing on room air  Abdomen/ GI: Kidney transplant incision well healed  Extremity: BLE trace edema -  Skin: no rash     Assessment/Plan      Trever Ellison is a 19 y.o. male who underwent LRKT on 4/25/24.      Graft Function:    Creatinine 1.23, nimco 1.12     Immunosuppression:     Tacrolimus drop to 2 mg bid, goal 8-10  Prednisone 5 mg daily  Myfortic - drop to 540 bid for GI symptoms- plan to increase back in 1-2 weeks     Diuretics/ Electrolytes:  No changes     Diarrhea  Stool studies unremarkable  Has GI appt coming up for Crohn's follow-up in July  Biologic treatment TBD    Nutrition/ Dietitian for optimizing diet and probiotics     RTC:              3 week with labs q2 weeks     I saw and examined Trever Ellison this morning in the outpatient clinic.     I spent 35 minutes in the professional and overall care of this patient, including immunosuppression management.     Aravind Salas MD, [facility]  Transplant and Hepatobiliary Surgery

## 2024-06-04 NOTE — PATIENT INSTRUCTIONS
Decrease prednisone 5 mg daily  Decrease Myfortic 540 mg (3 tablets) every 12 hours  Labs every 2 weeks  RTC 3 weeks  Ok to go to a baseball game  We will set you up to see the dietician   show

## 2024-06-05 ENCOUNTER — LAB (OUTPATIENT)
Dept: LAB | Facility: LAB | Age: 19
End: 2024-06-05
Payer: COMMERCIAL

## 2024-06-05 DIAGNOSIS — Z94.0 KIDNEY REPLACED BY TRANSPLANT (HHS-HCC): ICD-10-CM

## 2024-06-05 PROCEDURE — 87801 DETECT AGNT MULT DNA AMPLI: CPT

## 2024-06-05 PROCEDURE — 36415 COLL VENOUS BLD VENIPUNCTURE: CPT

## 2024-06-05 PROCEDURE — 85027 COMPLETE CBC AUTOMATED: CPT

## 2024-06-05 PROCEDURE — 87799 DETECT AGENT NOS DNA QUANT: CPT

## 2024-06-05 PROCEDURE — 80197 ASSAY OF TACROLIMUS: CPT

## 2024-06-06 ENCOUNTER — PATIENT MESSAGE (OUTPATIENT)
Dept: TRANSPLANT | Facility: HOSPITAL | Age: 19
End: 2024-06-06
Payer: COMMERCIAL

## 2024-06-06 ENCOUNTER — TELEPHONE (OUTPATIENT)
Dept: TRANSPLANT | Facility: HOSPITAL | Age: 19
End: 2024-06-06
Payer: COMMERCIAL

## 2024-06-06 DIAGNOSIS — Z94.0 KIDNEY REPLACED BY TRANSPLANT (HHS-HCC): ICD-10-CM

## 2024-06-06 LAB
ERYTHROCYTE [DISTWIDTH] IN BLOOD BY AUTOMATED COUNT: 14.1 % (ref 11.5–14.5)
HCT VFR BLD AUTO: 42.9 % (ref 41–52)
HGB BLD-MCNC: 13.2 G/DL (ref 13.5–17.5)
MCH RBC QN AUTO: 30.3 PG (ref 26–34)
MCHC RBC AUTO-ENTMCNC: 30.8 G/DL (ref 32–36)
MCV RBC AUTO: 98 FL (ref 80–100)
NRBC BLD-RTO: 0 /100 WBCS (ref 0–0)
PLATELET # BLD AUTO: 290 X10*3/UL (ref 150–450)
RBC # BLD AUTO: 4.36 X10*6/UL (ref 4.5–5.9)
TACROLIMUS BLD-MCNC: 6.8 NG/ML
WBC # BLD AUTO: 10.1 X10*3/UL (ref 4.4–11.3)

## 2024-06-07 ENCOUNTER — LAB (OUTPATIENT)
Dept: LAB | Facility: LAB | Age: 19
End: 2024-06-07
Payer: COMMERCIAL

## 2024-06-07 DIAGNOSIS — Z94.0 KIDNEY REPLACED BY TRANSPLANT (HHS-HCC): ICD-10-CM

## 2024-06-07 LAB
ALBUMIN SERPL BCP-MCNC: 4.9 G/DL (ref 3.4–5)
ANION GAP SERPL CALC-SCNC: 13 MMOL/L (ref 10–20)
BKV DNA SERPL NAA+PROBE-LOG#: NORMAL {LOG_COPIES}/ML
BUN SERPL-MCNC: 19 MG/DL (ref 6–23)
CALCIUM SERPL-MCNC: 10.1 MG/DL (ref 8.6–10.6)
CHLORIDE SERPL-SCNC: 104 MMOL/L (ref 98–107)
CMV DNA SERPL NAA+PROBE-LOG IU: NORMAL {LOG_IU}/ML
CO2 SERPL-SCNC: 27 MMOL/L (ref 21–32)
CREAT SERPL-MCNC: 1.25 MG/DL (ref 0.5–1.3)
EBV DNA SPEC NAA+PROBE-LOG#: NORMAL {LOG_COPIES}/ML
EGFRCR SERPLBLD CKD-EPI 2021: 85 ML/MIN/1.73M*2
GLUCOSE SERPL-MCNC: 131 MG/DL (ref 74–99)
LABORATORY COMMENT REPORT: NOT DETECTED
PHOSPHATE SERPL-MCNC: 3.6 MG/DL (ref 2.5–4.9)
POTASSIUM SERPL-SCNC: 4.4 MMOL/L (ref 3.5–5.3)
SODIUM SERPL-SCNC: 140 MMOL/L (ref 136–145)

## 2024-06-07 PROCEDURE — 36415 COLL VENOUS BLD VENIPUNCTURE: CPT

## 2024-06-07 PROCEDURE — 80069 RENAL FUNCTION PANEL: CPT

## 2024-06-07 RX ORDER — TACROLIMUS 1 MG/1
2 CAPSULE ORAL EVERY 12 HOURS
Qty: 120 CAPSULE | Refills: 11 | Status: SHIPPED | OUTPATIENT
Start: 2024-06-07 | End: 2025-06-07

## 2024-06-13 ENCOUNTER — TELEPHONE (OUTPATIENT)
Dept: TRANSPLANT | Facility: HOSPITAL | Age: 19
End: 2024-06-13
Payer: COMMERCIAL

## 2024-06-13 DIAGNOSIS — Z94.0 KIDNEY REPLACED BY TRANSPLANT (HHS-HCC): ICD-10-CM

## 2024-06-13 NOTE — TELEPHONE ENCOUNTER
Lisseth from client service called with a cancel test didn't say what test it was. I called the lab they said there was nothing in there system.

## 2024-06-21 ENCOUNTER — PATIENT MESSAGE (OUTPATIENT)
Dept: TRANSPLANT | Facility: HOSPITAL | Age: 19
End: 2024-06-21
Payer: COMMERCIAL

## 2024-06-21 ENCOUNTER — LAB (OUTPATIENT)
Dept: LAB | Facility: LAB | Age: 19
End: 2024-06-21
Payer: COMMERCIAL

## 2024-06-21 DIAGNOSIS — Z94.0 KIDNEY REPLACED BY TRANSPLANT (HHS-HCC): ICD-10-CM

## 2024-06-21 LAB
ALBUMIN SERPL BCP-MCNC: 4.9 G/DL (ref 3.4–5)
ANION GAP SERPL CALC-SCNC: 13 MMOL/L (ref 10–20)
BUN SERPL-MCNC: 24 MG/DL (ref 6–23)
CALCIUM SERPL-MCNC: 10 MG/DL (ref 8.6–10.6)
CHLORIDE SERPL-SCNC: 105 MMOL/L (ref 98–107)
CO2 SERPL-SCNC: 26 MMOL/L (ref 21–32)
CREAT SERPL-MCNC: 1.27 MG/DL (ref 0.5–1.3)
EGFRCR SERPLBLD CKD-EPI 2021: 83 ML/MIN/1.73M*2
ERYTHROCYTE [DISTWIDTH] IN BLOOD BY AUTOMATED COUNT: 13.8 % (ref 11.5–14.5)
GLUCOSE SERPL-MCNC: 120 MG/DL (ref 74–99)
HCT VFR BLD AUTO: 39.8 % (ref 41–52)
HGB BLD-MCNC: 13 G/DL (ref 13.5–17.5)
MAGNESIUM SERPL-MCNC: 1.97 MG/DL (ref 1.6–2.4)
MCH RBC QN AUTO: 29.5 PG (ref 26–34)
MCHC RBC AUTO-ENTMCNC: 32.7 G/DL (ref 32–36)
MCV RBC AUTO: 91 FL (ref 80–100)
NRBC BLD-RTO: 0 /100 WBCS (ref 0–0)
PHOSPHATE SERPL-MCNC: 3.6 MG/DL (ref 2.5–4.9)
PLATELET # BLD AUTO: 252 X10*3/UL (ref 150–450)
POTASSIUM SERPL-SCNC: 4.4 MMOL/L (ref 3.5–5.3)
RBC # BLD AUTO: 4.4 X10*6/UL (ref 4.5–5.9)
SODIUM SERPL-SCNC: 140 MMOL/L (ref 136–145)
WBC # BLD AUTO: 6.1 X10*3/UL (ref 4.4–11.3)

## 2024-06-21 PROCEDURE — 80069 RENAL FUNCTION PANEL: CPT

## 2024-06-21 PROCEDURE — 83735 ASSAY OF MAGNESIUM: CPT

## 2024-06-21 PROCEDURE — 36415 COLL VENOUS BLD VENIPUNCTURE: CPT

## 2024-06-21 PROCEDURE — 85027 COMPLETE CBC AUTOMATED: CPT

## 2024-06-21 PROCEDURE — 80197 ASSAY OF TACROLIMUS: CPT

## 2024-06-21 NOTE — TELEPHONE ENCOUNTER
From: Trever Ellison  To: Nurse Louann ARIAS  Sent: 6/21/2024 1:22 PM EDT  Subject: Lab work     I believe you requested lab work be done this week. We are about to head over to the lab soon. Let me know what tests

## 2024-06-22 LAB — TACROLIMUS BLD-MCNC: 4.5 NG/ML

## 2024-06-25 ENCOUNTER — DOCUMENTATION (OUTPATIENT)
Dept: TRANSPLANT | Facility: HOSPITAL | Age: 19
End: 2024-06-25

## 2024-06-25 ENCOUNTER — SPECIALTY PHARMACY (OUTPATIENT)
Dept: PHARMACY | Facility: CLINIC | Age: 19
End: 2024-06-25

## 2024-06-25 ENCOUNTER — NUTRITION (OUTPATIENT)
Dept: TRANSPLANT | Facility: HOSPITAL | Age: 19
End: 2024-06-25
Payer: COMMERCIAL

## 2024-06-25 ENCOUNTER — APPOINTMENT (OUTPATIENT)
Dept: TRANSPLANT | Facility: HOSPITAL | Age: 19
End: 2024-06-25
Payer: COMMERCIAL

## 2024-06-25 VITALS
HEART RATE: 101 BPM | DIASTOLIC BLOOD PRESSURE: 90 MMHG | TEMPERATURE: 97.2 F | OXYGEN SATURATION: 96 % | WEIGHT: 103.4 LBS | SYSTOLIC BLOOD PRESSURE: 124 MMHG | BODY MASS INDEX: 16.69 KG/M2

## 2024-06-25 DIAGNOSIS — D84.9 IMMUNOSUPPRESSION (MULTI): Primary | ICD-10-CM

## 2024-06-25 DIAGNOSIS — R19.7 DIARRHEA, UNSPECIFIED TYPE: ICD-10-CM

## 2024-06-25 DIAGNOSIS — Z94.0 KIDNEY REPLACED BY TRANSPLANT (HHS-HCC): ICD-10-CM

## 2024-06-25 PROCEDURE — 99214 OFFICE O/P EST MOD 30 MIN: CPT | Mod: 24 | Performed by: TRANSPLANT SURGERY

## 2024-06-25 PROCEDURE — RXMED WILLOW AMBULATORY MEDICATION CHARGE

## 2024-06-25 PROCEDURE — 99214 OFFICE O/P EST MOD 30 MIN: CPT | Performed by: TRANSPLANT SURGERY

## 2024-06-25 RX ORDER — TACROLIMUS 1 MG/1
3 CAPSULE ORAL EVERY 12 HOURS
Qty: 180 CAPSULE | Refills: 11 | Status: SHIPPED | OUTPATIENT
Start: 2024-06-25 | End: 2025-06-25

## 2024-06-25 ASSESSMENT — ENCOUNTER SYMPTOMS
COUGH: 0
DYSURIA: 0
DIZZINESS: 0
COLOR CHANGE: 0
CONSTIPATION: 0
HALLUCINATIONS: 0
AGITATION: 0
SHORTNESS OF BREATH: 0
FEVER: 0
ADENOPATHY: 0
WEAKNESS: 0
CONFUSION: 0
LIGHT-HEADEDNESS: 0
EYES NEGATIVE: 1
FREQUENCY: 0
CHILLS: 0
ARTHRALGIAS: 0
DIARRHEA: 0
HEMATURIA: 0
ABDOMINAL PAIN: 0
ABDOMINAL DISTENTION: 0

## 2024-06-25 ASSESSMENT — PAIN SCALES - GENERAL: PAINLEVEL: 0-NO PAIN

## 2024-06-25 NOTE — PROGRESS NOTES
Subjective   Kelvin Ellison is a 19 y.o. male who underwent LRKT on 4/25/2024 (Kidney). Here today for follow-up.    Review of Systems   Constitutional:  Negative for chills and fever.   HENT: Negative.  Negative for congestion.    Eyes: Negative.    Respiratory:  Negative for cough and shortness of breath.    Cardiovascular:  Negative for chest pain.   Gastrointestinal:  Negative for abdominal distention, abdominal pain, constipation and diarrhea.   Endocrine: Negative for cold intolerance and heat intolerance.   Genitourinary:  Negative for dysuria, frequency, hematuria and urgency.   Musculoskeletal:  Negative for arthralgias.   Skin:  Negative for color change.   Allergic/Immunologic: Negative for environmental allergies.   Neurological:  Negative for dizziness, weakness and light-headedness.   Hematological:  Negative for adenopathy.   Psychiatric/Behavioral:  Negative for agitation, confusion and hallucinations.         Objective   Vitals:    06/25/24 1118   BP: 124/90   Pulse: 101   Temp: 36.2 °C (97.2 °F)   SpO2: 96%       Physical Exam  Constitutional:       Appearance: Normal appearance.   HENT:      Head: Normocephalic and atraumatic.      Nose: Nose normal.   Eyes:      Pupils: Pupils are equal, round, and reactive to light.   Cardiovascular:      Rate and Rhythm: Normal rate.   Pulmonary:      Effort: Pulmonary effort is normal. No respiratory distress.   Abdominal:      General: There is no distension.      Palpations: Abdomen is soft. There is no mass.      Comments: Well healed   Musculoskeletal:         General: No swelling. Normal range of motion.      Cervical back: Normal range of motion.      Right lower leg: No edema.      Left lower leg: No edema.   Skin:     General: Skin is warm and dry.   Neurological:      General: No focal deficit present.      Mental Status: He is alert and oriented to person, place, and time.   Psychiatric:         Mood and Affect: Mood normal.         Behavior: Behavior  normal.       Lab Results   Component Value Date    CREATININE 1.27 06/21/2024    K 4.4 06/21/2024    GLUCOSE 120 (H) 06/21/2024    HCT 39.8 (L) 06/21/2024    WBC 6.1 06/21/2024     06/21/2024    CALCIUM 10.0 06/21/2024     Lab Results   Component Value Date    WBC 6.1 06/21/2024    HGB 13.0 (L) 06/21/2024    HCT 39.8 (L) 06/21/2024    MCV 91 06/21/2024     06/21/2024     Lab Results   Component Value Date    GLUCOSE 120 (H) 06/21/2024    CALCIUM 10.0 06/21/2024     06/21/2024    K 4.4 06/21/2024    CO2 26 06/21/2024     06/21/2024    BUN 24 (H) 06/21/2024    CREATININE 1.27 06/21/2024       Current Outpatient Medications:     acyclovir (Zovirax) 400 mg tablet, Take 1 tablet (400 mg) by mouth every 12 hours., Disp: 60 tablet, Rfl: 1    amitriptyline (Elavil) 10 mg tablet, TAKE 1 TABLET (10 MG) BY MOUTH ONCE DAILY AT BEDTIME., Disp: 90 tablet, Rfl: 4    clotrimazole (Mycelex) 10 mg bernard, Take 1 tablet (10 mg) by mouth 3 times a day with meals., Disp: 90 tablet, Rfl: 0    ferrous sulfate, 325 mg ferrous sulfate, tablet, Take 1 tablet by mouth once daily with breakfast., Disp: 30 tablet, Rfl: 1    mycophenolate (Myfortic) 180 mg EC tablet, Take 3 tablets (540 mg) by mouth 2 times a day., Disp: 180 tablet, Rfl: 11    pantoprazole (ProtoNix) 40 mg EC tablet, Take 1 tablet (40 mg) by mouth once daily. Do not crush, chew, or split., Disp: 30 tablet, Rfl: 0    predniSONE (Deltasone) 5 mg tablet, Take 1 tablet (5 mg) by mouth once daily., Disp: 30 tablet, Rfl: 11    sulfamethoxazole-trimethoprim (Bactrim) 400-80 mg tablet, Take 1 tablet by mouth once daily., Disp: 30 tablet, Rfl: 4    tacrolimus (Prograf) 1 mg capsule, Take 2 capsules (2 mg) by mouth every 12 hours., Disp: 120 capsule, Rfl: 11    vedolizumab (Entyvio) 300 mg injection, Infuse 300 mg into a venous catheter 1 time., Disp: , Rfl:       Assessment/Plan   LRKT 4/25/2024    Kidney allograft function   Good function, creatinine  1.27    Immunosuppression   Thymo 4.5mg/kg    Tac 3/3   Myfortic 540   Pred 5    Crohn's disease   Has GI appt coming up for follow up   Will need to determine what biologic Rx and timing   Probably after 3 months   No symptoms

## 2024-06-25 NOTE — PROGRESS NOTES
Reason for Nutrition Visit:  Pt is a 19 y.o. male referred for medical nutrition therapy s/p kidney transplant 4/25/24.    Transplant Coordinator: Louann Breen RN    Past Medical Hx:  Patient Active Problem List   Diagnosis    Crohn's disease (Multi)    Interstitial nephritis determined by biopsy    ESRD on peritoneal dialysis (Multi)    Pulmonary granuloma (Multi)    Granulomatous colitis (Multi)    Acute diarrhea    Crohn's disease of both small and large intestine (Multi)    Fistula, anal    Adverse effect of cephalosporins and other beta-lactam antibiotics, subsequent encounter    Aluminum bone disease    Anemia in chronic kidney disease    Breakdown (mechanical) of intraperitoneal dialysis catheter, initial encounter (CMS-[facility])    Cachexia (Multi)    Decreased bone density    Acute renal failure superimposed on stage 4 chronic kidney disease (Multi)    ESRD (end stage renal disease) on dialysis (Multi)    ESRD needing dialysis (Multi)    Generalized abdominal pain    Hydronephrosis    Immunosuppression (Multi)    Iron deficiency anemia, unspecified    Kidney failure    Lesion of lung    Nausea and vomiting    Other specified noninfective gastroenteritis and colitis    Vitamin D deficiency    Unspecified severe protein-calorie malnutrition (Multi)    Transplant    Tachycardia    Short stature (child)    Secondary hyperparathyroidism of renal origin (Multi)    Renal dysfunction    Peritoneal dialysis catheter in place (CMS-HCC)    Dependence on renal dialysis (CMS-HCC)    Perianal Crohn's disease (Multi)    Hypokalemia    Shortness of breath    ESRD (end stage renal disease) (Multi)    Kidney replaced by transplant ([facility])        Lab Results   Component Value Date    HGBA1C 4.2 04/16/2024    HGBA1C 4.8 08/18/2023     06/21/2024    K 4.4 06/21/2024    PHOS 3.6 06/21/2024     06/21/2024    CO2 26 06/21/2024    BUN 24 (H) 06/21/2024    CREATININE 1.27 06/21/2024    CALCIUM 10.0 06/21/2024    ALBUMIN  4.9 06/21/2024    PROT 7.8 04/16/2024    BILITOT 0.3 04/16/2024    ALKPHOS 224 (H) 04/16/2024    ALT 17 04/16/2024    AST 17 04/16/2024    GLUCOSE 120 (H) 06/21/2024         Allergies: None  Intolerance: None  Appetite: Good  Intake: >75%  GI Symptoms : diarrhea every few days which patient thinks is to due to recent change in medication.  Swallowing Difficulty: No problems with swallowing  Dentition : own    Food and Nutrition Hx:    24 Diet Recall:  Nutrition Supplements: None-doesn't like  Meal 1: corn pops, eggs, phoenix biscuits (10am) (patient doesn't always eat breakfast)  AM Snack: none  Meal 2: leftovers-sub sandwiches, chicken tenders 2 palms worth (12-1pm)  PM Snack: boiled peanuts, chips, pretzels, dip  Meal 3: parents cook a lot...fish, chicken, enchiladas, chicken parm (cooked in olive oil), chicken pot pie, wings, meatloaf, meatball subs (by 8pm)  After dinner snacks: none  Meds at 2am and 2pm  Beverages: sweet green tea, water, coke, milk     WEIGHT HISTORY  CW: 46.9 kg (104 lbs)   BMI: 16.69 kg/m2 (underweight)  43.7 kg (4/25/24)  Weight change:  3.2 kg weight gain since transplant on 4/25/24  Initial weight goal of 115 lbs for healthy BMI  Goal to gain 11lbs    Food Preparation: Parents/Guardian  Cooking Skills/Barriers: None reported  Grocery Shopping: Guardian/Parent    Types of Activities: Walking  Patient and Mom set goal to do more family walks together in the matt by their home.     Sleep duration/quality : 7+ hours  Sleep disorders: none      Nutrition Interventions and Education:  Tips for healthy weight gain    *Aim for 3 meals and 2-3 snacks daily    *Choose foods that have the most calories and nutrients in the smallest volume.  For example, choose 1 tablespoon of nut butter (100 calories) versus 1 tablespoon of hummus (25 calories).    *Avoid sugar-sweetened beverages which provide empty calories and reduce your appetite for solid, nutrient-rich foods.    *Have a small amount of fluids  (approximately 8 oz) with meals. Too much fluid consumption during meals can fill you up and decrease your appetite for solid foods. Hydrating between meals allows you to consume more solid foods at meal times.    *Think of snacks as mini-meals and an opportunity to add more nutrition. Prioritize calorie-dense foods like lean protein and healthy fats (eggs, nuts and seeds, nut butters, hummus, chicken, avocado, etc.).    *If you are taking nutrition supplements, have one supplement after your evening meal and before bed. This will assist with weight gain and will prevent supplements from interfering with your daily meals and snacks. If you have been prescribed more than one supplement per day, please consume between meals. Calories from supplements will assist with weight gain when they are consumed in addition to a regular diet. Avoid using supplements as a meal replacement.    *Review ideas for healthy, higher-calories meals and snacks that we provided during our appointment.      Nutrition Goals:  Nutrition Goals : Consistent meal/snack pattern  Gradual Weight Gain  Eat 3 meals consistently  Consume meal or snack every 3-4 hours    Patient and Mom's Goals:  Patient's goal is to eat a minimum of three meals each day-see if planning it out or prepping the day before helps.  2. Patient currently consuming 3-4 sweet teas per day. Goal to decrease to two per day and not drink tea until end of dinner or after dinner, so it does not interfere with appetite for solid foods.  3. Family walks  4. Include protein and healthy fats at every meal      Nutrition Recommendations/Referrals:  High Protein, High Calorie Nutrition Prescription  Strategies for Healthy Weight Gain    Follow up: 7/30/24 at 11am

## 2024-06-26 ENCOUNTER — PHARMACY VISIT (OUTPATIENT)
Dept: PHARMACY | Facility: CLINIC | Age: 19
End: 2024-06-26
Payer: COMMERCIAL

## 2024-06-26 ENCOUNTER — SPECIALTY PHARMACY (OUTPATIENT)
Dept: PHARMACY | Facility: CLINIC | Age: 19
End: 2024-06-26

## 2024-06-28 ENCOUNTER — LAB (OUTPATIENT)
Dept: LAB | Facility: LAB | Age: 19
End: 2024-06-28
Payer: COMMERCIAL

## 2024-06-28 DIAGNOSIS — Z94.0 KIDNEY REPLACED BY TRANSPLANT (HHS-HCC): ICD-10-CM

## 2024-06-28 LAB
ALBUMIN SERPL BCP-MCNC: 5 G/DL (ref 3.4–5)
ANION GAP SERPL CALC-SCNC: 14 MMOL/L (ref 10–20)
BUN SERPL-MCNC: 24 MG/DL (ref 6–23)
CALCIUM SERPL-MCNC: 10.1 MG/DL (ref 8.6–10.6)
CHLORIDE SERPL-SCNC: 106 MMOL/L (ref 98–107)
CO2 SERPL-SCNC: 26 MMOL/L (ref 21–32)
CREAT SERPL-MCNC: 1.27 MG/DL (ref 0.5–1.3)
EGFRCR SERPLBLD CKD-EPI 2021: 83 ML/MIN/1.73M*2
ERYTHROCYTE [DISTWIDTH] IN BLOOD BY AUTOMATED COUNT: 14.1 % (ref 11.5–14.5)
GLUCOSE SERPL-MCNC: 82 MG/DL (ref 74–99)
HCT VFR BLD AUTO: 44 % (ref 41–52)
HGB BLD-MCNC: 13.6 G/DL (ref 13.5–17.5)
MAGNESIUM SERPL-MCNC: 1.98 MG/DL (ref 1.6–2.4)
MCH RBC QN AUTO: 29.2 PG (ref 26–34)
MCHC RBC AUTO-ENTMCNC: 30.9 G/DL (ref 32–36)
MCV RBC AUTO: 94 FL (ref 80–100)
NRBC BLD-RTO: 0 /100 WBCS (ref 0–0)
PHOSPHATE SERPL-MCNC: 4.1 MG/DL (ref 2.5–4.9)
PLATELET # BLD AUTO: 245 X10*3/UL (ref 150–450)
POTASSIUM SERPL-SCNC: 4.7 MMOL/L (ref 3.5–5.3)
RBC # BLD AUTO: 4.66 X10*6/UL (ref 4.5–5.9)
SODIUM SERPL-SCNC: 141 MMOL/L (ref 136–145)
WBC # BLD AUTO: 4.8 X10*3/UL (ref 4.4–11.3)

## 2024-06-28 PROCEDURE — 83735 ASSAY OF MAGNESIUM: CPT

## 2024-06-28 PROCEDURE — 85027 COMPLETE CBC AUTOMATED: CPT

## 2024-06-28 PROCEDURE — 80069 RENAL FUNCTION PANEL: CPT

## 2024-06-28 PROCEDURE — 80197 ASSAY OF TACROLIMUS: CPT

## 2024-06-28 PROCEDURE — 36415 COLL VENOUS BLD VENIPUNCTURE: CPT

## 2024-06-29 ENCOUNTER — DOCUMENTATION (OUTPATIENT)
Dept: TRANSPLANT | Facility: HOSPITAL | Age: 19
End: 2024-06-29
Payer: COMMERCIAL

## 2024-06-29 LAB — TACROLIMUS BLD-MCNC: 7.2 NG/ML

## 2024-06-29 NOTE — PROGRESS NOTES
On-Call Follow-Up  Primary coordinator requested on-call follow up on Tacrolimus since last level was low at 4.5 on Tac 2/2, new level 7.2  on Tac 3/3 per Dr. Williamson's notes.

## 2024-07-03 LAB
ALLOSURE SCORE - KIDNEY: 0.1 %
CAREDX_ORDER_ID: NORMAL
CENTER_ORDER_ID: NORMAL
CLIENT SPECIMEN ID - ALLOSURE: NORMAL
DONOR RELATION - ALLOSURE: NORMAL
NOTES - ALLOSURE: NORMAL
RELATIVE CHANGE VALUE - KIDNEY: NORMAL %
TEST COMMENTS - ALLOSURE: NORMAL
TIME POST TX - ALLOSURE: NORMAL
TRANSPLANTED ORGAN - ALLOSURE: NORMAL
TX DATE - ALLOSURE/ALLOMAP: NORMAL
WP_ORDER_ID: NORMAL

## 2024-07-09 ENCOUNTER — TELEPHONE (OUTPATIENT)
Dept: PRIMARY CARE | Facility: CLINIC | Age: 19
End: 2024-07-09
Payer: COMMERCIAL

## 2024-07-10 ENCOUNTER — PATIENT MESSAGE (OUTPATIENT)
Dept: TRANSPLANT | Facility: HOSPITAL | Age: 19
End: 2024-07-10
Payer: COMMERCIAL

## 2024-07-10 DIAGNOSIS — Z94.0 KIDNEY REPLACED BY TRANSPLANT (HHS-HCC): Primary | ICD-10-CM

## 2024-07-11 RX ORDER — TACROLIMUS 1 MG/1
3 CAPSULE ORAL 2 TIMES DAILY
Qty: 42 CAPSULE | Refills: 0 | Status: SHIPPED | OUTPATIENT
Start: 2024-07-11 | End: 2024-07-18

## 2024-07-12 ENCOUNTER — LAB (OUTPATIENT)
Dept: LAB | Facility: LAB | Age: 19
End: 2024-07-12
Payer: COMMERCIAL

## 2024-07-12 DIAGNOSIS — Z94.0 KIDNEY REPLACED BY TRANSPLANT (HHS-HCC): ICD-10-CM

## 2024-07-12 LAB
ALBUMIN SERPL BCP-MCNC: 5 G/DL (ref 3.4–5)
ANION GAP SERPL CALC-SCNC: 14 MMOL/L (ref 10–20)
BUN SERPL-MCNC: 22 MG/DL (ref 6–23)
CALCIUM SERPL-MCNC: 9.9 MG/DL (ref 8.6–10.6)
CHLORIDE SERPL-SCNC: 106 MMOL/L (ref 98–107)
CO2 SERPL-SCNC: 26 MMOL/L (ref 21–32)
CREAT SERPL-MCNC: 1.14 MG/DL (ref 0.5–1.3)
EGFRCR SERPLBLD CKD-EPI 2021: >90 ML/MIN/1.73M*2
ERYTHROCYTE [DISTWIDTH] IN BLOOD BY AUTOMATED COUNT: 13.8 % (ref 11.5–14.5)
GLUCOSE SERPL-MCNC: 84 MG/DL (ref 74–99)
HCT VFR BLD AUTO: 42 % (ref 41–52)
HGB BLD-MCNC: 13.4 G/DL (ref 13.5–17.5)
MAGNESIUM SERPL-MCNC: 1.93 MG/DL (ref 1.6–2.4)
MCH RBC QN AUTO: 29.3 PG (ref 26–34)
MCHC RBC AUTO-ENTMCNC: 31.9 G/DL (ref 32–36)
MCV RBC AUTO: 92 FL (ref 80–100)
NRBC BLD-RTO: 0 /100 WBCS (ref 0–0)
PHOSPHATE SERPL-MCNC: 4.3 MG/DL (ref 2.5–4.9)
PLATELET # BLD AUTO: 254 X10*3/UL (ref 150–450)
POTASSIUM SERPL-SCNC: 4.7 MMOL/L (ref 3.5–5.3)
RBC # BLD AUTO: 4.57 X10*6/UL (ref 4.5–5.9)
SODIUM SERPL-SCNC: 141 MMOL/L (ref 136–145)
TACROLIMUS BLD-MCNC: 5.5 NG/ML
WBC # BLD AUTO: 4 X10*3/UL (ref 4.4–11.3)

## 2024-07-12 PROCEDURE — 36415 COLL VENOUS BLD VENIPUNCTURE: CPT

## 2024-07-12 PROCEDURE — 85027 COMPLETE CBC AUTOMATED: CPT

## 2024-07-12 PROCEDURE — 80069 RENAL FUNCTION PANEL: CPT

## 2024-07-12 PROCEDURE — 80197 ASSAY OF TACROLIMUS: CPT

## 2024-07-12 PROCEDURE — 83735 ASSAY OF MAGNESIUM: CPT

## 2024-07-15 ENCOUNTER — PATIENT MESSAGE (OUTPATIENT)
Dept: TRANSPLANT | Facility: HOSPITAL | Age: 19
End: 2024-07-15
Payer: COMMERCIAL

## 2024-07-15 DIAGNOSIS — Z94.0 KIDNEY REPLACED BY TRANSPLANT (HHS-HCC): ICD-10-CM

## 2024-07-15 RX ORDER — TACROLIMUS 1 MG/1
4 CAPSULE ORAL EVERY 12 HOURS
Qty: 240 CAPSULE | Refills: 11 | Status: SHIPPED | OUTPATIENT
Start: 2024-07-15 | End: 2025-07-15

## 2024-07-22 ENCOUNTER — SPECIALTY PHARMACY (OUTPATIENT)
Dept: PHARMACY | Facility: CLINIC | Age: 19
End: 2024-07-22

## 2024-07-22 DIAGNOSIS — Z94.0 KIDNEY REPLACED BY TRANSPLANT (HHS-HCC): ICD-10-CM

## 2024-07-22 PROCEDURE — RXMED WILLOW AMBULATORY MEDICATION CHARGE

## 2024-07-23 ENCOUNTER — PHARMACY VISIT (OUTPATIENT)
Dept: PHARMACY | Facility: CLINIC | Age: 19
End: 2024-07-23
Payer: COMMERCIAL

## 2024-07-23 PROCEDURE — RXMED WILLOW AMBULATORY MEDICATION CHARGE

## 2024-07-23 RX ORDER — FERROUS SULFATE 325(65) MG
325 TABLET ORAL
Qty: 30 TABLET | Refills: 1 | Status: SHIPPED | OUTPATIENT
Start: 2024-07-23 | End: 2024-09-21

## 2024-07-25 ENCOUNTER — LAB (OUTPATIENT)
Dept: LAB | Facility: LAB | Age: 19
End: 2024-07-25
Payer: COMMERCIAL

## 2024-07-25 ENCOUNTER — LAB (OUTPATIENT)
Dept: TRANSPLANT | Facility: HOSPITAL | Age: 19
End: 2024-07-25

## 2024-07-25 ENCOUNTER — APPOINTMENT (OUTPATIENT)
Dept: PRIMARY CARE | Facility: CLINIC | Age: 19
End: 2024-07-25
Payer: COMMERCIAL

## 2024-07-25 ENCOUNTER — DOCUMENTATION (OUTPATIENT)
Dept: TRANSPLANT | Facility: HOSPITAL | Age: 19
End: 2024-07-25

## 2024-07-25 DIAGNOSIS — K50.118 CROHN'S DISEASE OF COLON WITH OTHER COMPLICATION (MULTI): Primary | ICD-10-CM

## 2024-07-25 DIAGNOSIS — Z94.0 KIDNEY REPLACED BY TRANSPLANT (HHS-HCC): ICD-10-CM

## 2024-07-25 DIAGNOSIS — R10.9 STOMACH CRAMPS: ICD-10-CM

## 2024-07-25 LAB
ALBUMIN SERPL BCP-MCNC: 5.1 G/DL (ref 3.4–5)
ANION GAP SERPL CALC-SCNC: 14 MMOL/L (ref 10–20)
BUN SERPL-MCNC: 25 MG/DL (ref 6–23)
CALCIUM SERPL-MCNC: 10.1 MG/DL (ref 8.6–10.6)
CHLORIDE SERPL-SCNC: 106 MMOL/L (ref 98–107)
CO2 SERPL-SCNC: 25 MMOL/L (ref 21–32)
CREAT SERPL-MCNC: 1.47 MG/DL (ref 0.5–1.3)
EGFRCR SERPLBLD CKD-EPI 2021: 70 ML/MIN/1.73M*2
ERYTHROCYTE [DISTWIDTH] IN BLOOD BY AUTOMATED COUNT: 13.5 % (ref 11.5–14.5)
GLUCOSE SERPL-MCNC: 74 MG/DL (ref 74–99)
HCT VFR BLD AUTO: 41.9 % (ref 41–52)
HGB BLD-MCNC: 13.6 G/DL (ref 13.5–17.5)
MAGNESIUM SERPL-MCNC: 2.08 MG/DL (ref 1.6–2.4)
MCH RBC QN AUTO: 29.2 PG (ref 26–34)
MCHC RBC AUTO-ENTMCNC: 32.5 G/DL (ref 32–36)
MCV RBC AUTO: 90 FL (ref 80–100)
NRBC BLD-RTO: 0 /100 WBCS (ref 0–0)
PHOSPHATE SERPL-MCNC: 4.9 MG/DL (ref 2.5–4.9)
PLATELET # BLD AUTO: 258 X10*3/UL (ref 150–450)
POTASSIUM SERPL-SCNC: 4.9 MMOL/L (ref 3.5–5.3)
RBC # BLD AUTO: 4.65 X10*6/UL (ref 4.5–5.9)
SODIUM SERPL-SCNC: 140 MMOL/L (ref 136–145)
TACROLIMUS BLD-MCNC: 10.7 NG/ML
WBC # BLD AUTO: 4.5 X10*3/UL (ref 4.4–11.3)

## 2024-07-25 PROCEDURE — 80197 ASSAY OF TACROLIMUS: CPT

## 2024-07-25 PROCEDURE — 83735 ASSAY OF MAGNESIUM: CPT

## 2024-07-25 PROCEDURE — 80069 RENAL FUNCTION PANEL: CPT

## 2024-07-25 PROCEDURE — 85027 COMPLETE CBC AUTOMATED: CPT

## 2024-07-25 PROCEDURE — 36415 COLL VENOUS BLD VENIPUNCTURE: CPT

## 2024-07-25 PROCEDURE — 87799 DETECT AGENT NOS DNA QUANT: CPT

## 2024-07-25 PROCEDURE — 99213 OFFICE O/P EST LOW 20 MIN: CPT | Performed by: FAMILY MEDICINE

## 2024-07-25 NOTE — PROGRESS NOTES
Rec'd v/m from An[facility]  [phone] she will be closing the case if there are no issues.  Left her a v/m patient had an appt on 07/26/24 and to let me know when the case is closed.

## 2024-07-25 NOTE — PROGRESS NOTES
"Subjective   Trever Ellison \"Kelvin\" is a 19 y.o. male who presents for No chief complaint on file..  HPI    C/o stomach cramps. Some loose stools. Bms are every other day. No vomiting. Appetite good. No bloody stools.     Taking Clotrimazole po     No entyvio in some time. Plans to see GI soon. Plan is to use steroids for chrons flares instead of biologics. Has not had flare in some time.     Appt w transplant tomorrow    Sees Dr Weston for nephro     An interactive audio and video telecommunication system which permits real time communications between the patient  (at the originating site) and provider (at the distant site) was utilized to provide this telehealth service. Verbal consent was requested and obtained today for a telehealth visit.  Current Outpatient Medications on File Prior to Visit   Medication Sig Dispense Refill    acyclovir (Zovirax) 400 mg tablet Take 1 tablet (400 mg) by mouth every 12 hours. 60 tablet 1    amitriptyline (Elavil) 10 mg tablet TAKE 1 TABLET (10 MG) BY MOUTH ONCE DAILY AT BEDTIME. 90 tablet 4    ferrous sulfate, 325 mg ferrous sulfate, (FeroSuL) tablet Take 1 tablet by mouth once daily with breakfast. 30 tablet 1    mycophenolate (Myfortic) 180 mg EC tablet Take 3 tablets (540 mg) by mouth 2 times a day. 180 tablet 11    predniSONE (Deltasone) 5 mg tablet Take 1 tablet (5 mg) by mouth once daily. 30 tablet 11    sulfamethoxazole-trimethoprim (Bactrim) 400-80 mg tablet Take 1 tablet by mouth once daily. 30 tablet 4    tacrolimus (Prograf) 1 mg capsule Take 3 capsules (3 mg) by mouth 2 times a day for 7 days. 42 capsule 0    tacrolimus (Prograf) 1 mg capsule Take 4 capsules (4 mg) by mouth every 12 hours. 240 capsule 11    vedolizumab (Entyvio) 300 mg injection Infuse 300 mg into a venous catheter 1 time.      [DISCONTINUED] ferrous sulfate, 325 mg ferrous sulfate, tablet Take 1 tablet by mouth once daily with breakfast. 30 tablet 1    [DISCONTINUED] mycophenolate (Cellcept) 250 mg " capsule Take 4 capsules (1,000 mg) by mouth every 12 hours. 240 capsule 11    [DISCONTINUED] pantoprazole (ProtoNix) 40 mg EC tablet Take 1 tablet (40 mg) by mouth once daily. Do not crush, chew, or split. 30 tablet 0     No current facility-administered medications on file prior to visit.                  Objective   There were no vitals taken for this visit.   Physical Exam  General: NAD  HEENT:NCAT  Lungs: no audible wheeze or rhonchi   Skin: no rashes  Psych: cooperative, appropriate affect  Neuro: speech clear. A&Ox3    Assessment/Plan   Problem List Items Addressed This Visit       Granulomatous colitis (Multi) - Primary  In remission off biologics  Upcoming GI appt  Prefers to Tx prn flares w steroids      Kidney replaced by transplant ([facility])  Doing well  Transplant appt tomorrow  Briefly discussed vaccines. Will ultimately defer to transplant team       Other Visit Diagnoses       Stomach cramps      ? Med SE  Consider stopping Fe since HGB nml

## 2024-07-26 ENCOUNTER — OFFICE VISIT (OUTPATIENT)
Dept: TRANSPLANT | Facility: HOSPITAL | Age: 19
End: 2024-07-26
Payer: COMMERCIAL

## 2024-07-26 ENCOUNTER — SPECIALTY PHARMACY (OUTPATIENT)
Dept: PHARMACY | Facility: CLINIC | Age: 19
End: 2024-07-26

## 2024-07-26 VITALS
SYSTOLIC BLOOD PRESSURE: 111 MMHG | BODY MASS INDEX: 16.62 KG/M2 | TEMPERATURE: 97.9 F | HEART RATE: 104 BPM | OXYGEN SATURATION: 98 % | WEIGHT: 103 LBS | DIASTOLIC BLOOD PRESSURE: 70 MMHG

## 2024-07-26 DIAGNOSIS — Z94.0 KIDNEY REPLACED BY TRANSPLANT (HHS-HCC): ICD-10-CM

## 2024-07-26 DIAGNOSIS — Z92.25 PERSONAL HISTORY OF IMMUNOSUPRESSION THERAPY: Primary | ICD-10-CM

## 2024-07-26 DIAGNOSIS — Z51.81 THERAPEUTIC DRUG MONITORING: ICD-10-CM

## 2024-07-26 LAB
BKV DNA SERPL NAA+PROBE-LOG#: NORMAL {LOG_COPIES}/ML
EBV DNA SPEC NAA+PROBE-LOG#: NORMAL {LOG_COPIES}/ML
LABORATORY COMMENT REPORT: NOT DETECTED
LABORATORY COMMENT REPORT: NOT DETECTED

## 2024-07-26 PROCEDURE — 99214 OFFICE O/P EST MOD 30 MIN: CPT

## 2024-07-26 RX ORDER — TACROLIMUS 0.5 MG/1
0.5 CAPSULE ORAL 2 TIMES DAILY
Qty: 180 CAPSULE | Refills: 3 | Status: SHIPPED | OUTPATIENT
Start: 2024-07-26 | End: 2025-07-26

## 2024-07-26 RX ORDER — TACROLIMUS 1 MG/1
3 CAPSULE ORAL EVERY 12 HOURS
Qty: 540 CAPSULE | Refills: 3 | Status: SHIPPED | OUTPATIENT
Start: 2024-07-26 | End: 2025-07-26

## 2024-07-26 ASSESSMENT — ENCOUNTER SYMPTOMS
PALPITATIONS: 0
COUGH: 0
FREQUENCY: 0
HEADACHES: 0
CONFUSION: 0
ABDOMINAL DISTENTION: 0
BACK PAIN: 0
SHORTNESS OF BREATH: 0
VOMITING: 0
NERVOUS/ANXIOUS: 0
CHEST TIGHTNESS: 0
NAUSEA: 0
DIARRHEA: 0
HEMATURIA: 0

## 2024-07-26 ASSESSMENT — PAIN SCALES - GENERAL: PAINLEVEL: 0-NO PAIN

## 2024-07-26 NOTE — PROGRESS NOTES
Trever Ellison 19 y.o. with a history of congenital hydronephrosis and ESRD secondary to chronic Remicade use secondary to chron's disease s/p living related kidney transplant done on 4/25/2024.  PRA 0%, hepatitis C viral status D-/R- CMV D-/R-, hepatitis B virus D-/R-.  Patient had a spontaneous graft function with no significant posttransplant complications.    Interim history: Patient's mom and patient have a long discussion with me about restarting back of Entyvio which is a monoclonal antibody for mucosal cell addition molecule1 for his Crohn's.  Seems that he is a stable without this medicine since transplant almost 3 months post.  And no episodes of Crohn's recently.  I think we can wait and watch and can initiate at the point of recurrence.  Will discuss with GI in detail.      Review of Systems   Respiratory:  Negative for cough, chest tightness and shortness of breath.    Cardiovascular:  Negative for chest pain, palpitations and leg swelling.   Gastrointestinal:  Negative for abdominal distention, diarrhea, nausea and vomiting.   Genitourinary:  Negative for frequency, hematuria and urgency.   Musculoskeletal:  Negative for back pain.   Neurological:  Negative for headaches.   Psychiatric/Behavioral:  Negative for confusion. The patient is not nervous/anxious.         Objective:  Visit Vitals  /70   Pulse 104   Temp 36.6 °C (97.9 °F) (Temporal)   Wt 46.7 kg (103 lb)   SpO2 98%   BMI 16.62 kg/m²   Smoking Status Never   BSA 1.47 m²      Physical Exam  HENT:      Head: Normocephalic and atraumatic.      Nose: Nose normal.      Mouth/Throat:      Mouth: Mucous membranes are moist.   Eyes:      Extraocular Movements: Extraocular movements intact.      Pupils: Pupils are equal, round, and reactive to light.   Cardiovascular:      Rate and Rhythm: Normal rate.      Pulses: Normal pulses.      Heart sounds: Normal heart sounds.   Pulmonary:      Effort: Pulmonary effort is normal.   Abdominal:       Palpations: Abdomen is soft.      Tenderness: There is no abdominal tenderness. There is no guarding.   Musculoskeletal:         General: Normal range of motion.      Cervical back: Normal range of motion.   Skin:     General: Skin is warm.   Neurological:      General: No focal deficit present.      Mental Status: Mental status is at baseline.   Psychiatric:         Mood and Affect: Mood normal.            Current Outpatient Medications:     acyclovir (Zovirax) 400 mg tablet, Take 1 tablet (400 mg) by mouth every 12 hours., Disp: 60 tablet, Rfl: 1    amitriptyline (Elavil) 10 mg tablet, TAKE 1 TABLET (10 MG) BY MOUTH ONCE DAILY AT BEDTIME., Disp: 90 tablet, Rfl: 4    ferrous sulfate, 325 mg ferrous sulfate, (FeroSuL) tablet, Take 1 tablet by mouth once daily with breakfast., Disp: 30 tablet, Rfl: 1    mycophenolate (Myfortic) 180 mg EC tablet, Take 3 tablets (540 mg) by mouth 2 times a day., Disp: 180 tablet, Rfl: 11    predniSONE (Deltasone) 5 mg tablet, Take 1 tablet (5 mg) by mouth once daily., Disp: 30 tablet, Rfl: 11    sulfamethoxazole-trimethoprim (Bactrim) 400-80 mg tablet, Take 1 tablet by mouth once daily., Disp: 30 tablet, Rfl: 4    tacrolimus (Prograf) 1 mg capsule, Take 4 capsules (4 mg) by mouth every 12 hours., Disp: 240 capsule, Rfl: 11    vedolizumab (Entyvio) 300 mg injection, Infuse 300 mg into a venous catheter 1 time., Disp: , Rfl:      [unfilled]     No images are attached to the encounter.     Assessment and Plan :Trever Ellison 19 y.o. with a history of congenital hydronephrosis and ESRD secondary to chronic Remicade use secondary to chron's disease s/p living related kidney transplant done on 4/25/2024.  PRA 0%, hepatitis C viral status D-/R- CMV D-/R-, hepatitis B virus D-/R-.  Patient had a spontaneous graft function with no significant posttransplant complications.    Interim history: Patient's mom and patient have a long discussion with me about restarting back of Entyvio  which is a monoclonal antibody for mucosal cell addition molecule1 for his Crohn's.  Seems that he is a stable without this medicine since transplant almost 3 months post.  And no episodes of Crohn's recently.  I think we can wait and watch and can initiate at the point of recurrence.  Will discuss with GI in detail.    Allograft function: Mild elevation in the creatinine noticed recently from his baseline 1.2-1.3.  Will decrease tacrolimus dose as likely because of tacrolimus toxicity.  Will follow-up with repeat labs.  Last UPC 0.48 downtrending.  AlloSure is also stable at 0.10.  -Recent EBV, BK, CMV negative as of 7/25/2024.  DSA negative as of 4/16/2024.    Immunosuppression: Continue with the prednisone 5 mg daily mycophenolate 3 capsules twice daily and tacrolimus 3.5 twice daily aim tacrolimus levels 5-8    Infectious prophylaxis: Stop acyclovir, continue with the Bactrim for 6 months.    No anemia or leukopenia continue iron supplementation.    Bone mineral disease: Vitamin D levels are 26 will consider vitamin D over-the-counter supplementation recent calcium levels are optimal    General health care: Recommended age-appropriate screening, COVID shots annual dermatology visits,DEXA scan 2-3 yrs while on steroids .    Labs every other week and follow-up in a month.  Will discuss with the GI about his Crohn's    Santa Singleton MD    Notes created by Lawrence -Please excuse the Typos .

## 2024-07-27 ENCOUNTER — PHARMACY VISIT (OUTPATIENT)
Dept: PHARMACY | Facility: CLINIC | Age: 19
End: 2024-07-27
Payer: COMMERCIAL

## 2024-07-28 LAB
ALLOSURE SCORE - KIDNEY: < 0.08 %
CAREDX_ORDER_ID: NORMAL
CENTER_ORDER_ID: NORMAL
CLIENT SPECIMEN ID - ALLOSURE: NORMAL
DONOR RELATION - ALLOSURE: NORMAL
NOTES - ALLOSURE: NORMAL
RELATIVE CHANGE VALUE - KIDNEY: NORMAL
TEST COMMENTS - ALLOSURE: NORMAL
TIME POST TX - ALLOSURE: NORMAL
TRANSPLANTED ORGAN - ALLOSURE: NORMAL
TX DATE - ALLOSURE/ALLOMAP: NORMAL
WP_ORDER_ID: NORMAL

## 2024-07-29 ENCOUNTER — LAB (OUTPATIENT)
Dept: LAB | Facility: LAB | Age: 19
End: 2024-07-29
Payer: COMMERCIAL

## 2024-07-29 DIAGNOSIS — Z94.0 KIDNEY REPLACED BY TRANSPLANT (HHS-HCC): ICD-10-CM

## 2024-07-29 LAB
ALBUMIN SERPL BCP-MCNC: 4.9 G/DL (ref 3.4–5)
ANION GAP SERPL CALC-SCNC: 16 MMOL/L (ref 10–20)
BUN SERPL-MCNC: 25 MG/DL (ref 6–23)
CALCIUM SERPL-MCNC: 10.1 MG/DL (ref 8.6–10.6)
CHLORIDE SERPL-SCNC: 108 MMOL/L (ref 98–107)
CO2 SERPL-SCNC: 25 MMOL/L (ref 21–32)
CREAT SERPL-MCNC: 1.33 MG/DL (ref 0.5–1.3)
EGFRCR SERPLBLD CKD-EPI 2021: 78 ML/MIN/1.73M*2
ERYTHROCYTE [DISTWIDTH] IN BLOOD BY AUTOMATED COUNT: 13.7 % (ref 11.5–14.5)
GLUCOSE SERPL-MCNC: 88 MG/DL (ref 74–99)
HCT VFR BLD AUTO: 41.9 % (ref 41–52)
HGB BLD-MCNC: 13.5 G/DL (ref 13.5–17.5)
MCH RBC QN AUTO: 29.6 PG (ref 26–34)
MCHC RBC AUTO-ENTMCNC: 32.2 G/DL (ref 32–36)
MCV RBC AUTO: 92 FL (ref 80–100)
NRBC BLD-RTO: 0 /100 WBCS (ref 0–0)
PHOSPHATE SERPL-MCNC: 4.1 MG/DL (ref 2.5–4.9)
PLATELET # BLD AUTO: 268 X10*3/UL (ref 150–450)
POTASSIUM SERPL-SCNC: 4.6 MMOL/L (ref 3.5–5.3)
RBC # BLD AUTO: 4.56 X10*6/UL (ref 4.5–5.9)
SODIUM SERPL-SCNC: 144 MMOL/L (ref 136–145)
TACROLIMUS BLD-MCNC: 11.4 NG/ML
WBC # BLD AUTO: 3.6 X10*3/UL (ref 4.4–11.3)

## 2024-07-29 PROCEDURE — 36415 COLL VENOUS BLD VENIPUNCTURE: CPT

## 2024-07-29 PROCEDURE — 80069 RENAL FUNCTION PANEL: CPT

## 2024-07-29 PROCEDURE — 80197 ASSAY OF TACROLIMUS: CPT

## 2024-07-29 PROCEDURE — 85027 COMPLETE CBC AUTOMATED: CPT

## 2024-07-29 NOTE — PROGRESS NOTES
Reason for Nutrition Visit:  Pt is a 19 y.o. male referred for MNT post status DDKT on 4/25/2024.     Past Medical Hx:  Patient Active Problem List   Diagnosis    Crohn's disease (Multi)    Interstitial nephritis determined by biopsy    ESRD on peritoneal dialysis (Multi)    Pulmonary granuloma (Multi)    Granulomatous colitis (Multi)    Acute diarrhea    Crohn's disease of both small and large intestine (Multi)    Fistula, anal    Adverse effect of cephalosporins and other beta-lactam antibiotics, subsequent encounter    Aluminum bone disease    Anemia in chronic kidney disease    Breakdown (mechanical) of intraperitoneal dialysis catheter, initial encounter (CMS-HCC)    Cachexia (Multi)    Decreased bone density    Acute renal failure superimposed on stage 4 chronic kidney disease (Multi)    ESRD (end stage renal disease) on dialysis (Multi)    ESRD needing dialysis (Multi)    Generalized abdominal pain    Hydronephrosis    Immunosuppression (Multi)    Iron deficiency anemia, unspecified    Kidney failure    Lesion of lung    Nausea and vomiting    Other specified noninfective gastroenteritis and colitis    Vitamin D deficiency    Unspecified severe protein-calorie malnutrition (Multi)    Transplant    Tachycardia    Short stature (child)    Secondary hyperparathyroidism of renal origin (Multi)    Renal dysfunction    Peritoneal dialysis catheter in place (CMS-HCC)    Dependence on renal dialysis (CMS-HCC)    Perianal Crohn's disease (Multi)    Hypokalemia    Shortness of breath    ESRD (end stage renal disease) (Multi)    Kidney replaced by transplant ([facility])        Food and Nutrition Hx:  Spoke with pt's mom. Pt's mom reports thing are going ok, meds being adjusted She reports trying to encourage pt to eat his meals and snacks. She reports buying various snacks high in calorie at [employer] that the pt likes. She reports about a week ago they had a death in the family so trying to process that. She reports he is  still eating about 2 meals a day with 1-2 snacks.    WEIGHT HISTORY  CW: 103 (46.7 kg), goal weight of 115#, weight has been stable. Has not lost since last appt.   BMI: 16.62 kg/m (underweight)  Weight change:  No  Significant Weight Change: No    Lab Results   Component Value Date    HGBA1C 4.2 04/16/2024    BUN 25 (H) 07/25/2024    PHOS 4.9 07/25/2024    K 4.9 07/25/2024          Food Preparation: Parents/Guardian  Cooking Skills/Barriers: None reported  Grocery Shopping: Guardian/Parent      Sleep duration/quality : 7+ hours  Sleep disorders: none      Nutrition Focused Physical Exam:    Performed/Deferred: Deferred due to be being virtual visit    Malnutrition Present: Unable to Determine at this Time    Estimated Energy Needs:    Weight Gain Needs: 30-35 kcal/kg/day and 1.2-1.5 g/pro/kg/day    Estimated Needs: 56-61 grams of  protein and 5189-4319  kcal for weight gain    Nutrition Diagnosis:    Diagnosis Statement 1:  Diagnosis Status: Ongoing  Diagnosis : Inadequate protein-energy intake  related to  increased metabolic demand   as evidenced by poor intake, change in eating habits, early satiety, skipped meals and diet recall, BMI 16.62 (underweight)    Nutrition Interventions:  We discussed the importance of increased calories and protein with current disease state.    Plan to eat 3 meals and 3 snacks daily.  [place] with timing meals to find out when you have a larger appetite.  Appetite may be greatest in the morning after not eating all night so you may prefer to eat your larger meals and snacksin the morning and at lunch.  Breakfast-type foods are often better tolerated so eat foods such as eggs, pancakes, waffles and cereal for any meal or snack.  Carry snacks with you so you are prepared to eat every 2 to 3 hours.  Determine what works best for you if your body's cues for feeling hungry or full are not working.  Eat a small meal or snack even if you don't feel hungry.  Set a timer to remind you  when it is time to eat.  Take a walk before you eat (with health care provider's approval).  Light or moderate physical activity can help you maintain muscle and increase your appetite.  Adding calories  Drink milk, chocolate milk, soy milk, or smoothies instead of low-calorie beverages such as diet drinks or water.  Cook with milk or soy milk instead of water when making dishes such as hot cereal, cocoa, or pudding.  Add jelly, jam, honey, butter or margarine to bread and crackers. Add jam or fruit to ice cream and as a topping over cake.  Mix dried fruit, nuts, granola, honey, or dry cereal with yogurt or hot cereals.  Enjoy snacks such as milkshakes, smoothies, pudding, ice cream, or custard.  Blend a fruit smoothie of a banana, frozen berries, milk or soy milk, and 1 tablespoon nonfat powdered milk or protein powder.    Adding Protein  Add ¼ cup nonfat dry milk powder or protein powder to make a high-protein milk to drink or to use in recipes that call for milk.  Vanilla or peppermint extract or unsweetened cocoa powder could help to boost the flavor.  Add hard-cooked eggs, leftover meat, grated cheese, canned beans or tofu to noodles, rice, salads, sandwiches, soups,  casseroles, pasta, tuna and other mixed dishes.  Add powdered milk or protein powder to hot cereals, meatloaf, casseroles, scrambled eggs, sauces, cream soups, and shakes.  Add beans and lentils to salads, soups, casseroles, and vegetable dishes.  Eat cottage cheese or yogurt, especially Greek yogurt, with fruit as a snack or dessert.     Nutrition Goals:  Nutrition Goals : Consistent meal/snack pattern  Gradual Weight Gain  Eat 3 meals consistently  Eat breakfast consistently  Consume meal or snack every 3-4 hours  Include both protein and starch at all meals and snacks      Educational Handouts: High Calorie High Protein

## 2024-07-30 ENCOUNTER — NUTRITION (OUTPATIENT)
Dept: TRANSPLANT | Facility: HOSPITAL | Age: 19
End: 2024-07-30
Payer: COMMERCIAL

## 2024-07-30 ENCOUNTER — APPOINTMENT (OUTPATIENT)
Dept: TRANSPLANT | Facility: HOSPITAL | Age: 19
End: 2024-07-30
Payer: COMMERCIAL

## 2024-07-31 ENCOUNTER — DOCUMENTATION (OUTPATIENT)
Dept: TRANSPLANT | Facility: HOSPITAL | Age: 19
End: 2024-07-31
Payer: COMMERCIAL

## 2024-07-31 LAB
CMV DNA SERPL NAA+PROBE-LOG IU: NORMAL {LOG_IU}/ML
LABORATORY COMMENT REPORT: NOT DETECTED

## 2024-07-31 NOTE — PROGRESS NOTES
Lab Review with Dr. Pratt,   Tac 11.4, just decreased Tac to 3.5 mg BID, prev 10.7 on 7/25 when Tac was 4 mg BID   -  Confirmed via mychart true trough and patient decreased tacrolimus to 3.5 mg BID  -  Plan per Dr. Pratt  decrease Tacrolimus to 3 mg BID, per mychart patient confirmed new dosing.

## 2024-08-06 ENCOUNTER — LAB (OUTPATIENT)
Dept: LAB | Facility: LAB | Age: 19
End: 2024-08-06
Payer: COMMERCIAL

## 2024-08-06 DIAGNOSIS — Z94.0 KIDNEY REPLACED BY TRANSPLANT (HHS-HCC): ICD-10-CM

## 2024-08-06 LAB
ALBUMIN SERPL BCP-MCNC: 4.7 G/DL (ref 3.4–5)
ANION GAP SERPL CALC-SCNC: 16 MMOL/L (ref 10–20)
BUN SERPL-MCNC: 21 MG/DL (ref 6–23)
CALCIUM SERPL-MCNC: 10 MG/DL (ref 8.6–10.6)
CHLORIDE SERPL-SCNC: 105 MMOL/L (ref 98–107)
CO2 SERPL-SCNC: 26 MMOL/L (ref 21–32)
CREAT SERPL-MCNC: 1.49 MG/DL (ref 0.5–1.3)
EGFRCR SERPLBLD CKD-EPI 2021: 68 ML/MIN/1.73M*2
ERYTHROCYTE [DISTWIDTH] IN BLOOD BY AUTOMATED COUNT: 13.3 % (ref 11.5–14.5)
GLUCOSE SERPL-MCNC: 85 MG/DL (ref 74–99)
HCT VFR BLD AUTO: 40.2 % (ref 41–52)
HGB BLD-MCNC: 12.8 G/DL (ref 13.5–17.5)
MAGNESIUM SERPL-MCNC: 2 MG/DL (ref 1.6–2.4)
MCH RBC QN AUTO: 29 PG (ref 26–34)
MCHC RBC AUTO-ENTMCNC: 31.8 G/DL (ref 32–36)
MCV RBC AUTO: 91 FL (ref 80–100)
NRBC BLD-RTO: 0 /100 WBCS (ref 0–0)
PHOSPHATE SERPL-MCNC: 3 MG/DL (ref 2.5–4.9)
PLATELET # BLD AUTO: 254 X10*3/UL (ref 150–450)
POTASSIUM SERPL-SCNC: 4.5 MMOL/L (ref 3.5–5.3)
RBC # BLD AUTO: 4.41 X10*6/UL (ref 4.5–5.9)
SODIUM SERPL-SCNC: 142 MMOL/L (ref 136–145)
TACROLIMUS BLD-MCNC: 6.2 NG/ML
WBC # BLD AUTO: 4.2 X10*3/UL (ref 4.4–11.3)

## 2024-08-06 PROCEDURE — 83735 ASSAY OF MAGNESIUM: CPT

## 2024-08-06 PROCEDURE — 85027 COMPLETE CBC AUTOMATED: CPT

## 2024-08-06 PROCEDURE — 36415 COLL VENOUS BLD VENIPUNCTURE: CPT

## 2024-08-06 PROCEDURE — 80069 RENAL FUNCTION PANEL: CPT

## 2024-08-06 PROCEDURE — 80197 ASSAY OF TACROLIMUS: CPT

## 2024-08-07 ENCOUNTER — DOCUMENTATION (OUTPATIENT)
Dept: TRANSPLANT | Facility: HOSPITAL | Age: 19
End: 2024-08-07
Payer: COMMERCIAL

## 2024-08-15 DIAGNOSIS — Z91.89 AT RISK FOR INFECTION TRANSMITTED FROM DONOR: ICD-10-CM

## 2024-08-15 DIAGNOSIS — Z94.0 KIDNEY REPLACED BY TRANSPLANT (HHS-HCC): ICD-10-CM

## 2024-08-23 DIAGNOSIS — Z94.0 KIDNEY REPLACED BY TRANSPLANT (HHS-HCC): ICD-10-CM

## 2024-08-26 ENCOUNTER — LAB (OUTPATIENT)
Dept: LAB | Facility: LAB | Age: 19
End: 2024-08-26
Payer: COMMERCIAL

## 2024-08-26 DIAGNOSIS — Z94.0 KIDNEY REPLACED BY TRANSPLANT (HHS-HCC): ICD-10-CM

## 2024-08-26 LAB
25(OH)D3 SERPL-MCNC: 20 NG/ML (ref 30–100)
ALBUMIN SERPL BCP-MCNC: 4.8 G/DL (ref 3.4–5)
ANION GAP SERPL CALC-SCNC: 11 MMOL/L (ref 10–20)
BUN SERPL-MCNC: 19 MG/DL (ref 6–23)
CALCIUM SERPL-MCNC: 9.7 MG/DL (ref 8.6–10.6)
CHLORIDE SERPL-SCNC: 107 MMOL/L (ref 98–107)
CO2 SERPL-SCNC: 26 MMOL/L (ref 21–32)
CREAT SERPL-MCNC: 1.22 MG/DL (ref 0.5–1.3)
EGFRCR SERPLBLD CKD-EPI 2021: 87 ML/MIN/1.73M*2
ERYTHROCYTE [DISTWIDTH] IN BLOOD BY AUTOMATED COUNT: 12.7 % (ref 11.5–14.5)
GLUCOSE SERPL-MCNC: 79 MG/DL (ref 74–99)
HCT VFR BLD AUTO: 41.2 % (ref 41–52)
HGB BLD-MCNC: 12.9 G/DL (ref 13.5–17.5)
MAGNESIUM SERPL-MCNC: 2.28 MG/DL (ref 1.6–2.4)
MCH RBC QN AUTO: 29.1 PG (ref 26–34)
MCHC RBC AUTO-ENTMCNC: 31.3 G/DL (ref 32–36)
MCV RBC AUTO: 93 FL (ref 80–100)
NRBC BLD-RTO: 0 /100 WBCS (ref 0–0)
PHOSPHATE SERPL-MCNC: 4 MG/DL (ref 2.5–4.9)
PLATELET # BLD AUTO: 274 X10*3/UL (ref 150–450)
POTASSIUM SERPL-SCNC: 4.4 MMOL/L (ref 3.5–5.3)
RBC # BLD AUTO: 4.43 X10*6/UL (ref 4.5–5.9)
SODIUM SERPL-SCNC: 140 MMOL/L (ref 136–145)
WBC # BLD AUTO: 3.6 X10*3/UL (ref 4.4–11.3)

## 2024-08-26 PROCEDURE — 82746 ASSAY OF FOLIC ACID SERUM: CPT

## 2024-08-26 PROCEDURE — 83550 IRON BINDING TEST: CPT

## 2024-08-26 PROCEDURE — 87799 DETECT AGENT NOS DNA QUANT: CPT

## 2024-08-26 PROCEDURE — 82607 VITAMIN B-12: CPT

## 2024-08-26 PROCEDURE — 83540 ASSAY OF IRON: CPT

## 2024-08-26 PROCEDURE — 80069 RENAL FUNCTION PANEL: CPT

## 2024-08-26 PROCEDURE — 82728 ASSAY OF FERRITIN: CPT

## 2024-08-26 PROCEDURE — 85027 COMPLETE CBC AUTOMATED: CPT

## 2024-08-26 PROCEDURE — 36415 COLL VENOUS BLD VENIPUNCTURE: CPT

## 2024-08-26 PROCEDURE — 80197 ASSAY OF TACROLIMUS: CPT

## 2024-08-26 PROCEDURE — 82306 VITAMIN D 25 HYDROXY: CPT

## 2024-08-26 PROCEDURE — 83735 ASSAY OF MAGNESIUM: CPT

## 2024-08-26 PROCEDURE — 83970 ASSAY OF PARATHORMONE: CPT

## 2024-08-27 ENCOUNTER — OFFICE VISIT (OUTPATIENT)
Dept: TRANSPLANT | Facility: HOSPITAL | Age: 19
End: 2024-08-27
Payer: COMMERCIAL

## 2024-08-27 VITALS
OXYGEN SATURATION: 99 % | SYSTOLIC BLOOD PRESSURE: 134 MMHG | BODY MASS INDEX: 16.33 KG/M2 | DIASTOLIC BLOOD PRESSURE: 85 MMHG | TEMPERATURE: 97.6 F | HEART RATE: 66 BPM | WEIGHT: 101.2 LBS

## 2024-08-27 DIAGNOSIS — Z94.0 KIDNEY REPLACED BY TRANSPLANT (HHS-HCC): ICD-10-CM

## 2024-08-27 DIAGNOSIS — E55.9 VITAMIN D DEFICIENCY: ICD-10-CM

## 2024-08-27 DIAGNOSIS — Z92.25 PERSONAL HISTORY OF IMMUNOSUPRESSION THERAPY: Primary | ICD-10-CM

## 2024-08-27 DIAGNOSIS — Z51.81 THERAPEUTIC DRUG MONITORING: ICD-10-CM

## 2024-08-27 LAB
FERRITIN SERPL-MCNC: 472 NG/ML (ref 20–300)
FOLATE SERPL-MCNC: 9.2 NG/ML
IRON SATN MFR SERPL: 56 % (ref 25–45)
IRON SERPL-MCNC: 159 UG/DL (ref 35–150)
PTH-INTACT SERPL-MCNC: 74.1 PG/ML (ref 18.5–88)
TACROLIMUS BLD-MCNC: 7.2 NG/ML
TIBC SERPL-MCNC: 282 UG/DL (ref 240–445)
UIBC SERPL-MCNC: 123 UG/DL (ref 110–370)
VIT B12 SERPL-MCNC: 256 PG/ML (ref 211–911)

## 2024-08-27 PROCEDURE — 99214 OFFICE O/P EST MOD 30 MIN: CPT

## 2024-08-27 RX ORDER — CEPHRADINE 500 MG
50000 CAPSULE ORAL WEEKLY
Qty: 40 CAPSULE | Refills: 0 | Status: CANCELLED | OUTPATIENT
Start: 2024-08-27 | End: 2024-10-16

## 2024-08-27 RX ORDER — ERGOCALCIFEROL 1.25 MG/1
1.25 CAPSULE ORAL WEEKLY
Qty: 8 CAPSULE | Refills: 0 | Status: SHIPPED | OUTPATIENT
Start: 2024-08-27 | End: 2024-10-16

## 2024-08-27 ASSESSMENT — ENCOUNTER SYMPTOMS
FREQUENCY: 0
HEMATURIA: 0
COUGH: 0
ABDOMINAL DISTENTION: 0
NERVOUS/ANXIOUS: 0
HEADACHES: 0
BACK PAIN: 0
SHORTNESS OF BREATH: 0
DIARRHEA: 0
PALPITATIONS: 0
CHEST TIGHTNESS: 0
CONFUSION: 0
VOMITING: 0
NAUSEA: 0

## 2024-08-27 ASSESSMENT — PAIN SCALES - GENERAL: PAINLEVEL: 0-NO PAIN

## 2024-08-27 NOTE — PROGRESS NOTES
Trever Ellison  19 y.o. with a history of congenital hydronephrosis and ESRD secondary to chronic Remicade use secondary to chron's disease s/p living related kidney transplant done on 4/25/2024.  PRA 0%, hepatitis C viral status D-/R- CMV D-/R-, hepatitis B virus D-/R-.  Patient had a spontaneous graft function with no significant posttransplant complications.     Interim history: Denied any complaints on today's visit.  I discussed with his GI doctor who wants to start Entyvio to maintain Crohn's in remission.  Discussed with him and his mom about talking with gastroenterologist and expressed the concerns      Review of Systems   Respiratory:  Negative for cough, chest tightness and shortness of breath.    Cardiovascular:  Negative for chest pain, palpitations and leg swelling.   Gastrointestinal:  Negative for abdominal distention, diarrhea, nausea and vomiting.   Genitourinary:  Negative for frequency, hematuria and urgency.   Musculoskeletal:  Negative for back pain.   Neurological:  Negative for headaches.   Psychiatric/Behavioral:  Negative for confusion. The patient is not nervous/anxious.         Objective:  Visit Vitals  /85   Pulse 66   Temp 36.4 °C (97.6 °F) (Temporal)   Wt 45.9 kg (101 lb 3.2 oz)   SpO2 99%   BMI 16.33 kg/m²   Smoking Status Never   BSA 1.46 m²      Physical Exam  HENT:      Head: Normocephalic and atraumatic.      Nose: Nose normal.      Mouth/Throat:      Mouth: Mucous membranes are moist.   Eyes:      Extraocular Movements: Extraocular movements intact.      Pupils: Pupils are equal, round, and reactive to light.   Cardiovascular:      Rate and Rhythm: Normal rate.      Pulses: Normal pulses.      Heart sounds: Normal heart sounds.   Pulmonary:      Effort: Pulmonary effort is normal.   Abdominal:      Palpations: Abdomen is soft.      Tenderness: There is no abdominal tenderness. There is no guarding.   Musculoskeletal:         General: Normal range of motion.      Cervical  back: Normal range of motion.   Skin:     General: Skin is warm.   Neurological:      General: No focal deficit present.      Mental Status: Mental status is at baseline.   Psychiatric:         Mood and Affect: Mood normal.            Current Outpatient Medications:     amitriptyline (Elavil) 10 mg tablet, TAKE 1 TABLET (10 MG) BY MOUTH ONCE DAILY AT BEDTIME., Disp: 90 tablet, Rfl: 4    ferrous sulfate, 325 mg ferrous sulfate, (FeroSuL) tablet, Take 1 tablet by mouth once daily with breakfast., Disp: 30 tablet, Rfl: 1    mycophenolate (Myfortic) 180 mg EC tablet, Take 3 tablets (540 mg) by mouth 2 times a day., Disp: 180 tablet, Rfl: 11    predniSONE (Deltasone) 5 mg tablet, Take 1 tablet (5 mg) by mouth once daily., Disp: 30 tablet, Rfl: 11    sulfamethoxazole-trimethoprim (Bactrim) 400-80 mg tablet, Take 1 tablet by mouth once daily., Disp: 30 tablet, Rfl: 4    tacrolimus (Prograf) 1 mg capsule, Take 3 capsules (3 mg) by mouth every 12 hours., Disp: 540 capsule, Rfl: 3    tacrolimus (Prograf) 0.5 mg capsule, Take 1 capsule (0.5 mg) by mouth 2 times a day. (Patient not taking: Reported on 7/31/2024), Disp: 180 capsule, Rfl: 3    vedolizumab (Entyvio) 300 mg injection, Infuse 300 mg into a venous catheter 1 time., Disp: , Rfl:      [unfilled]     No images are attached to the encounter.     Assessment and Plan :Trever Ellison 19 y.o. with a history of congenital hydronephrosis and ESRD secondary to chronic Remicade use secondary to chron's disease s/p living related kidney transplant done on 4/25/2024.  PRA 0%, hepatitis C viral status D-/R- CMV D-/R-, hepatitis B virus D-/R-.  Patient had a spontaneous graft function with no significant posttransplant complications.     Interim history: Denied any complaints on today's visit.  I discussed with his GI doctor who wants to start Entyvio to maintain Crohn's in remission.  Discussed with him and his mom about talking with gastroenterologist and expressed  the concerns    Allograft function: Stable creatinine in the range of 1.2-1.1, stable electrolytes.  Last UPC 0.48 we will follow-up with repeat.  -AlloSure is stable at 0.08 as of 7/25/2024.  -Infectious workup like CMV, EBV, BK negative as of 7/25/2024.    Immunosuppression: Continue with the Myfortic 540 twice daily, prednisone 5 mg daily and tacrolimus 3 twice daily.  Recent tacrolimus levels are around 7.2 aim levels are 5-8.    Hemodynamics: Blood pressures are optimally controlled continue with the current regimen.    Anemia/leukopenia: Mild leukopenia noticed continue with the lower dose of Myfortic.  Monitor labs weekly    Bone mineral disease: Vitamin D levels are around 20 will start 50,000 units ergocalciferol.  Weekly for 8 weeks.    General health care: Recommended age-appropriate screening, COVID shots annual dermatology visits,DEXA scan 2-3 yrs while on steroids .    Labs weekly and follow-up in 6 weeks    Santa Singleton MD    Notes created by Lawrence -Please excuse the Typos .

## 2024-08-27 NOTE — PATIENT INSTRUCTIONS
PLAN:  -Start Vit D 50,000 units once a week for 8 weeks  -Iron studies   -Labs every week  -Follow up in 6 weeks

## 2024-08-28 ENCOUNTER — DOCUMENTATION (OUTPATIENT)
Dept: TRANSPLANT | Facility: HOSPITAL | Age: 19
End: 2024-08-28
Payer: COMMERCIAL

## 2024-08-28 DIAGNOSIS — Z94.0 KIDNEY REPLACED BY TRANSPLANT (HHS-HCC): ICD-10-CM

## 2024-08-28 LAB
BKV DNA SERPL NAA+PROBE-LOG#: NORMAL {LOG_COPIES}/ML
CMV DNA SERPL NAA+PROBE-LOG IU: NORMAL {LOG_IU}/ML
EBV DNA SPEC NAA+PROBE-LOG#: ABNORMAL {LOG_COPIES}/ML
LABORATORY COMMENT REPORT: ABNORMAL
LABORATORY COMMENT REPORT: NOT DETECTED
LABORATORY COMMENT REPORT: NOT DETECTED

## 2024-08-28 NOTE — PROGRESS NOTES
Labs reviewed with Dr Salas. New EBV D+/R- EBV <35. Per Dr salas CTM every two weeks. Updated labs to reflect this. Messaged pt and pts mother on mychart to inform them.

## 2024-08-29 LAB
ALLOSURE SCORE - KIDNEY: 0.08 %
CAREDX_ORDER_ID: NORMAL
CENTER_ORDER_ID: NORMAL
CLIENT SPECIMEN ID - ALLOSURE: NORMAL
DONOR RELATION - ALLOSURE: NORMAL
NOTES - ALLOSURE: NORMAL
RELATIVE CHANGE VALUE - KIDNEY: NORMAL %
TEST COMMENTS - ALLOSURE: NORMAL
TIME POST TX - ALLOSURE: NORMAL
TRANSPLANTED ORGAN - ALLOSURE: NORMAL
TX DATE - ALLOSURE/ALLOMAP: NORMAL
WP_ORDER_ID: NORMAL

## 2024-09-03 ENCOUNTER — DOCUMENTATION (OUTPATIENT)
Dept: TRANSPLANT | Facility: HOSPITAL | Age: 19
End: 2024-09-03
Payer: COMMERCIAL

## 2024-09-03 ENCOUNTER — TELEPHONE (OUTPATIENT)
Dept: TRANSPLANT | Facility: HOSPITAL | Age: 19
End: 2024-09-03
Payer: COMMERCIAL

## 2024-09-03 DIAGNOSIS — Z94.0 KIDNEY REPLACED BY TRANSPLANT (HHS-HCC): ICD-10-CM

## 2024-09-03 RX ORDER — SULFAMETHOXAZOLE AND TRIMETHOPRIM 400; 80 MG/1; MG/1
1 TABLET ORAL DAILY
Qty: 30 TABLET | Refills: 2 | Status: SHIPPED | OUTPATIENT
Start: 2024-09-03 | End: 2024-12-02

## 2024-09-03 NOTE — PROGRESS NOTES
Labs reviewed with Dr Santana. Iron studies. Discontinue iron supplements, messaged patient on mychart

## 2024-09-03 NOTE — TELEPHONE ENCOUNTER
Patient called requesting refill for prescription.  Patient's demographics verified, including .  Patient called requesting a refill of a prescription of  Bactrim .  Patient has enough medication for 0 more days.  Patient is requesting prescription be sent to [facility] in .  Phone number is [phone].  Patient's call back number is [phone].

## 2024-09-05 ENCOUNTER — SPECIALTY PHARMACY (OUTPATIENT)
Dept: PHARMACY | Facility: CLINIC | Age: 19
End: 2024-09-05

## 2024-09-05 ENCOUNTER — LAB (OUTPATIENT)
Dept: LAB | Facility: LAB | Age: 19
End: 2024-09-05
Payer: COMMERCIAL

## 2024-09-05 DIAGNOSIS — Z94.0 KIDNEY REPLACED BY TRANSPLANT (HHS-HCC): ICD-10-CM

## 2024-09-05 DIAGNOSIS — Z91.89 AT RISK FOR INFECTION TRANSMITTED FROM DONOR: ICD-10-CM

## 2024-09-05 PROCEDURE — 87801 DETECT AGNT MULT DNA AMPLI: CPT

## 2024-09-05 PROCEDURE — 85027 COMPLETE CBC AUTOMATED: CPT

## 2024-09-05 PROCEDURE — 36415 COLL VENOUS BLD VENIPUNCTURE: CPT

## 2024-09-06 LAB
ERYTHROCYTE [DISTWIDTH] IN BLOOD BY AUTOMATED COUNT: 12.2 % (ref 11.5–14.5)
HCT VFR BLD AUTO: 42.3 % (ref 41–52)
HGB BLD-MCNC: 13.4 G/DL (ref 13.5–17.5)
MCH RBC QN AUTO: 29.2 PG (ref 26–34)
MCHC RBC AUTO-ENTMCNC: 31.7 G/DL (ref 32–36)
MCV RBC AUTO: 92 FL (ref 80–100)
NRBC BLD-RTO: 0 /100 WBCS (ref 0–0)
PLATELET # BLD AUTO: 273 X10*3/UL (ref 150–450)
RBC # BLD AUTO: 4.59 X10*6/UL (ref 4.5–5.9)
WBC # BLD AUTO: 3.7 X10*3/UL (ref 4.4–11.3)

## 2024-09-08 LAB — HIV1 RNA SERPL DONR QL PROBE AMP: NORMAL

## 2024-09-13 ENCOUNTER — LAB (OUTPATIENT)
Dept: LAB | Facility: LAB | Age: 19
End: 2024-09-13
Payer: COMMERCIAL

## 2024-09-13 DIAGNOSIS — Z94.0 KIDNEY REPLACED BY TRANSPLANT (HHS-HCC): ICD-10-CM

## 2024-09-13 LAB
ALBUMIN SERPL BCP-MCNC: 4.8 G/DL (ref 3.4–5)
ANION GAP SERPL CALC-SCNC: 14 MMOL/L (ref 10–20)
BUN SERPL-MCNC: 21 MG/DL (ref 6–23)
CALCIUM SERPL-MCNC: 10.1 MG/DL (ref 8.6–10.6)
CHLORIDE SERPL-SCNC: 104 MMOL/L (ref 98–107)
CO2 SERPL-SCNC: 26 MMOL/L (ref 21–32)
CREAT SERPL-MCNC: 1.26 MG/DL (ref 0.5–1.3)
EGFRCR SERPLBLD CKD-EPI 2021: 84 ML/MIN/1.73M*2
ERYTHROCYTE [DISTWIDTH] IN BLOOD BY AUTOMATED COUNT: 12.2 % (ref 11.5–14.5)
GLUCOSE SERPL-MCNC: 113 MG/DL (ref 74–99)
HCT VFR BLD AUTO: 41.4 % (ref 41–52)
HGB BLD-MCNC: 13.1 G/DL (ref 13.5–17.5)
MAGNESIUM SERPL-MCNC: 2.02 MG/DL (ref 1.6–2.4)
MCH RBC QN AUTO: 29 PG (ref 26–34)
MCHC RBC AUTO-ENTMCNC: 31.6 G/DL (ref 32–36)
MCV RBC AUTO: 92 FL (ref 80–100)
NRBC BLD-RTO: 0 /100 WBCS (ref 0–0)
PHOSPHATE SERPL-MCNC: 4 MG/DL (ref 2.5–4.9)
PLATELET # BLD AUTO: 274 X10*3/UL (ref 150–450)
POTASSIUM SERPL-SCNC: 4.8 MMOL/L (ref 3.5–5.3)
RBC # BLD AUTO: 4.51 X10*6/UL (ref 4.5–5.9)
SODIUM SERPL-SCNC: 139 MMOL/L (ref 136–145)
WBC # BLD AUTO: 4.7 X10*3/UL (ref 4.4–11.3)

## 2024-09-13 PROCEDURE — 85027 COMPLETE CBC AUTOMATED: CPT

## 2024-09-13 PROCEDURE — 80069 RENAL FUNCTION PANEL: CPT

## 2024-09-13 PROCEDURE — 83735 ASSAY OF MAGNESIUM: CPT

## 2024-09-13 PROCEDURE — 36415 COLL VENOUS BLD VENIPUNCTURE: CPT

## 2024-09-13 PROCEDURE — 80197 ASSAY OF TACROLIMUS: CPT

## 2024-09-14 LAB — TACROLIMUS BLD-MCNC: 5.5 NG/ML

## 2024-09-24 ENCOUNTER — LAB (OUTPATIENT)
Dept: LAB | Facility: LAB | Age: 19
End: 2024-09-24
Payer: COMMERCIAL

## 2024-09-24 DIAGNOSIS — Z94.0 KIDNEY REPLACED BY TRANSPLANT (HHS-HCC): ICD-10-CM

## 2024-09-24 PROCEDURE — 80069 RENAL FUNCTION PANEL: CPT

## 2024-09-24 PROCEDURE — 83735 ASSAY OF MAGNESIUM: CPT

## 2024-09-24 PROCEDURE — 87799 DETECT AGENT NOS DNA QUANT: CPT

## 2024-09-24 PROCEDURE — 80197 ASSAY OF TACROLIMUS: CPT

## 2024-09-24 PROCEDURE — 36415 COLL VENOUS BLD VENIPUNCTURE: CPT

## 2024-09-24 PROCEDURE — 85027 COMPLETE CBC AUTOMATED: CPT

## 2024-09-25 DIAGNOSIS — Z94.0 KIDNEY REPLACED BY TRANSPLANT (HHS-HCC): ICD-10-CM

## 2024-09-25 LAB
ALBUMIN SERPL BCP-MCNC: 5 G/DL (ref 3.4–5)
ANION GAP SERPL CALC-SCNC: 14 MMOL/L (ref 10–20)
BUN SERPL-MCNC: 26 MG/DL (ref 6–23)
CALCIUM SERPL-MCNC: 10.2 MG/DL (ref 8.6–10.6)
CHLORIDE SERPL-SCNC: 105 MMOL/L (ref 98–107)
CO2 SERPL-SCNC: 26 MMOL/L (ref 21–32)
CREAT SERPL-MCNC: 1.41 MG/DL (ref 0.5–1.3)
EGFRCR SERPLBLD CKD-EPI 2021: 73 ML/MIN/1.73M*2
ERYTHROCYTE [DISTWIDTH] IN BLOOD BY AUTOMATED COUNT: 12 % (ref 11.5–14.5)
GLUCOSE SERPL-MCNC: 88 MG/DL (ref 74–99)
HCT VFR BLD AUTO: 41.9 % (ref 41–52)
HGB BLD-MCNC: 13.1 G/DL (ref 13.5–17.5)
MAGNESIUM SERPL-MCNC: 2.09 MG/DL (ref 1.6–2.4)
MCH RBC QN AUTO: 29 PG (ref 26–34)
MCHC RBC AUTO-ENTMCNC: 31.3 G/DL (ref 32–36)
MCV RBC AUTO: 93 FL (ref 80–100)
NRBC BLD-RTO: 0 /100 WBCS (ref 0–0)
PHOSPHATE SERPL-MCNC: 4.5 MG/DL (ref 2.5–4.9)
PLATELET # BLD AUTO: 304 X10*3/UL (ref 150–450)
POTASSIUM SERPL-SCNC: 4.5 MMOL/L (ref 3.5–5.3)
RBC # BLD AUTO: 4.52 X10*6/UL (ref 4.5–5.9)
SODIUM SERPL-SCNC: 140 MMOL/L (ref 136–145)
TACROLIMUS BLD-MCNC: 8.7 NG/ML
WBC # BLD AUTO: 4.2 X10*3/UL (ref 4.4–11.3)

## 2024-09-26 LAB
EBV DNA SPEC NAA+PROBE-LOG#: NORMAL {LOG_COPIES}/ML
LABORATORY COMMENT REPORT: NOT DETECTED

## 2024-10-04 ENCOUNTER — LAB (OUTPATIENT)
Dept: LAB | Facility: LAB | Age: 19
End: 2024-10-04
Payer: COMMERCIAL

## 2024-10-04 DIAGNOSIS — Z94.0 KIDNEY REPLACED BY TRANSPLANT (HHS-HCC): ICD-10-CM

## 2024-10-04 PROCEDURE — 80069 RENAL FUNCTION PANEL: CPT

## 2024-10-04 PROCEDURE — 80197 ASSAY OF TACROLIMUS: CPT

## 2024-10-04 PROCEDURE — 83735 ASSAY OF MAGNESIUM: CPT

## 2024-10-04 PROCEDURE — 36415 COLL VENOUS BLD VENIPUNCTURE: CPT

## 2024-10-04 PROCEDURE — 85027 COMPLETE CBC AUTOMATED: CPT

## 2024-10-05 LAB
ALBUMIN SERPL BCP-MCNC: 4.9 G/DL (ref 3.4–5)
ANION GAP SERPL CALC-SCNC: 14 MMOL/L (ref 10–20)
BUN SERPL-MCNC: 24 MG/DL (ref 6–23)
CALCIUM SERPL-MCNC: 10.1 MG/DL (ref 8.6–10.6)
CHLORIDE SERPL-SCNC: 104 MMOL/L (ref 98–107)
CO2 SERPL-SCNC: 27 MMOL/L (ref 21–32)
CREAT SERPL-MCNC: 1.3 MG/DL (ref 0.5–1.3)
EGFRCR SERPLBLD CKD-EPI 2021: 81 ML/MIN/1.73M*2
ERYTHROCYTE [DISTWIDTH] IN BLOOD BY AUTOMATED COUNT: 11.9 % (ref 11.5–14.5)
GLUCOSE SERPL-MCNC: 92 MG/DL (ref 74–99)
HCT VFR BLD AUTO: 43.4 % (ref 41–52)
HGB BLD-MCNC: 13.6 G/DL (ref 13.5–17.5)
MAGNESIUM SERPL-MCNC: 2.02 MG/DL (ref 1.6–2.4)
MCH RBC QN AUTO: 28.5 PG (ref 26–34)
MCHC RBC AUTO-ENTMCNC: 31.3 G/DL (ref 32–36)
MCV RBC AUTO: 91 FL (ref 80–100)
NRBC BLD-RTO: 0 /100 WBCS (ref 0–0)
PHOSPHATE SERPL-MCNC: 4 MG/DL (ref 2.5–4.9)
PLATELET # BLD AUTO: 298 X10*3/UL (ref 150–450)
POTASSIUM SERPL-SCNC: 4.5 MMOL/L (ref 3.5–5.3)
RBC # BLD AUTO: 4.77 X10*6/UL (ref 4.5–5.9)
SODIUM SERPL-SCNC: 140 MMOL/L (ref 136–145)
TACROLIMUS BLD-MCNC: 8.2 NG/ML
WBC # BLD AUTO: 3.8 X10*3/UL (ref 4.4–11.3)

## 2024-10-08 ENCOUNTER — SPECIALTY PHARMACY (OUTPATIENT)
Dept: PHARMACY | Facility: CLINIC | Age: 19
End: 2024-10-08

## 2024-10-08 ENCOUNTER — APPOINTMENT (OUTPATIENT)
Dept: TRANSPLANT | Facility: HOSPITAL | Age: 19
End: 2024-10-08
Payer: COMMERCIAL

## 2024-10-08 VITALS
SYSTOLIC BLOOD PRESSURE: 116 MMHG | WEIGHT: 101.1 LBS | BODY MASS INDEX: 16.32 KG/M2 | OXYGEN SATURATION: 98 % | TEMPERATURE: 97.7 F | HEART RATE: 101 BPM | DIASTOLIC BLOOD PRESSURE: 80 MMHG

## 2024-10-08 DIAGNOSIS — K50.10 CROHN'S DISEASE OF COLON WITHOUT COMPLICATION (MULTI): ICD-10-CM

## 2024-10-08 DIAGNOSIS — Z79.899 IMMUNOSUPPRESSIVE MANAGEMENT ENCOUNTER FOLLOWING KIDNEY TRANSPLANT: ICD-10-CM

## 2024-10-08 DIAGNOSIS — Z94.0 KIDNEY REPLACED BY TRANSPLANT (HHS-HCC): Primary | ICD-10-CM

## 2024-10-08 DIAGNOSIS — Z94.0 IMMUNOSUPPRESSIVE MANAGEMENT ENCOUNTER FOLLOWING KIDNEY TRANSPLANT: ICD-10-CM

## 2024-10-08 DIAGNOSIS — I10 ESSENTIAL HYPERTENSION: ICD-10-CM

## 2024-10-08 PROCEDURE — 3074F SYST BP LT 130 MM HG: CPT | Performed by: INTERNAL MEDICINE

## 2024-10-08 PROCEDURE — RXMED WILLOW AMBULATORY MEDICATION CHARGE

## 2024-10-08 PROCEDURE — 99214 OFFICE O/P EST MOD 30 MIN: CPT | Performed by: INTERNAL MEDICINE

## 2024-10-08 PROCEDURE — 3079F DIAST BP 80-89 MM HG: CPT | Performed by: INTERNAL MEDICINE

## 2024-10-08 ASSESSMENT — PAIN SCALES - GENERAL: PAINLEVEL: 0-NO PAIN

## 2024-10-08 NOTE — PROGRESS NOTES
TRANSPLANT NEPHROLOGY :   OUTPATIENT CLINIC NOTE      SERVICE DATE : 10/08/2024    REASON FOR VISIT/CHIEF COMPLAINT:  S/P  TRANSPLANT SURGERY  IMMUNOSUPPRESSIVE MEDICATION MANAGEMENT  BLOOD PRESSURE MANAGEMENT    HPI:    Mr. Ellison is a 19 y.o. male with past medical history significant for congenital hydronephrosis and ESRD secondary to chronic Remicade use secondary to chron's disease s/p living related kidney transplant done on 4/25/2024. PRA 0%, hepatitis C viral status D-/R- CMV D-/R-, hepatitis B virus D-/R-. Patient had a spontaneous graft function with no significant posttransplant complications.     Patient is here for follow up s/p kidney transplant.    GI would like to start Entyvio.    Patient is doing well overall. No new complaints. Denied chest pain, SOB, CH, Palpitation. Normal urination and bowel movement. Normal gait and no weakness of arms/legs. No cough, runny nose, sore throat, cold symptoms, or rash. No hearing loss. Normal vision.No problems with his sleep, mood and function. No recent infection, hospitalization, surgery or ER visits.      ROS:  Review of  14 systems was performed system by system. See HPI. Otherwise, the symptoms were negative.    PAST MEDICAL HISTORY:  Past Medical History:   Diagnosis Date    Acute Crohn's disease (Multi)     colitis    Aluminum bone disease     Anemia     Angina pectoris (CMS-HCC)     Cachexia (Multi)     End stage chronic kidney disease (Multi)     Dialysis    Hemodialysis patient (CMS-HCC)     Hydronephrosis     1 yo    Nephritis     Tachycardia     Vitamin D deficiency         PAST SURGICAL HISTORY:  Past Surgical History:   Procedure Laterality Date    COLON SURGERY  2014    crohns    COLONOSCOPY      CT GUIDED CHEST TUBE PLACEMENT  06/28/2023    CT GUIDED CHEST TUBE PLACEMENT 6/28/2023 [facility] CT    CT GUIDED PERCUTANEOUS BIOPSY KIDNEY  2023    CT GUIDED PERCUTANEOUS BIOPSY LUNG  06/28/2023    CT GUIDED PERCUTANEOUS BIOPSY LUNG 6/28/2023 [facility] CT     IR TUNNELED DIALYSIS CATHETER  2023    LUNG BIOPSY  2023    UPPER GASTROINTESTINAL ENDOSCOPY          SOCIAL HISTORY:  Social History     Socioeconomic History    Marital status: Single     Spouse name: Not on file    Number of children: Not on file    Years of education: Not on file    Highest education level: Not on file   Occupational History    Not on file   Tobacco Use    Smoking status: Never    Smokeless tobacco: Never   Vaping Use    Vaping status: Never Used   Substance and Sexual Activity    Alcohol use: Never    Drug use: Never    Sexual activity: Not Currently   Other Topics Concern    Not on file   Social History Narrative    Not on file     Social Determinants of Health     Financial Resource Strain: Not on file   Food Insecurity: No Food Insecurity (6/15/2021)    [facility]    Hunger Vital Sign     Worried About Running Out of Food in the Last Year: Never true     Ran Out of Food in the Last Year: Never true   Transportation Needs: Not on file   Physical Activity: Not on file   Stress: Not on file   Social Connections: Not on file   Intimate Partner Violence: Not on file   Housing Stability: Not on file       FAMILY HISTORY:  Family History   Problem Relation Name Age of Onset    Graves' disease Mother      Hypertension Father      Kidney disease Brother      Heart disease Maternal Grandmother      Hypertension Maternal Grandfather      COPD Paternal Grandmother      Heart disease Paternal Grandmother      Accidental death Paternal Grandfather      Other (Other) Father's Sister          AMYOTROPHIC LATERAL SCLEROSIS    Hyperlipidemia Other MAT RELATIVES     Hyperlipidemia Other PAT RELATIVES        MEDICATION LIST:  Current Outpatient Medications   Medication Instructions    amitriptyline (ELAVIL) 10 mg, oral, Nightly    ergocalciferol (VITAMIN D-2) 1,250 mcg, oral, Weekly    mycophenolate (MYFORTIC) 540 mg, oral, 2 times  "daily    predniSONE (DELTASONE) 5 mg, oral, Daily    tacrolimus (PROGRAF) 3 mg, oral, Every 12 hours    tacrolimus (PROGRAF) 0.5 mg, oral, 2 times daily    vedolizumab (ENTYVIO) 300 mg, intravenous, Once       ALLERGY  Allergies   Allergen Reactions    Cephalexin Rash    Methotrexate Headache, Other and Unknown     \"Did not tolerate well\", per mom. \" He would get sick, it caused nausea and headaches.\"    Adhesive Tape-Silicones Rash     Redness, raw skin       PHYSICAL EXAM:    Visit Vitals  /80   Pulse 101   Temp 36.5 °C (97.7 °F) (Temporal)   Wt 45.9 kg (101 lb 1.6 oz)   SpO2 98%   BMI 16.32 kg/m²   Smoking Status Never   BSA 1.46 m²          Vital signs - reviewed. Acceptable BP at this office visit.   General Appearance - NAD, Good speech, oriented and alert  HEENT - Supple. Not pale. No jaundice. No cervical lymphadenopathy. Pharynx and tonsils are not injected.  CVS - RRR. Normal S1/S2. No murmur, click , rub or gallop  Lungs- clear to auscultation bilaterally  Abdomen - soft , not tender, no guarding, no rigidity. No hepatosplenomegaly. Normal bowel sounds. No masses and ascites. S/P Kidney transplant .  Transplanted kidney is not tender.   Musculoskeletal /Extremities - no edema. Full ROM. No joint tenderness.   Neuro/Psych - appropriate mood and affect. Motor power V/V all extremities. CN I -XII were grossly intact.  Skin - No visible rash      LABS:    Lab Results   Component Value Date    WBC 3.8 (L) 10/04/2024    HGB 13.6 10/04/2024    HCT 43.4 10/04/2024     10/04/2024    ALT 17 04/16/2024    AST 17 04/16/2024     10/04/2024    K 4.5 10/04/2024     10/04/2024    CREATININE 1.30 10/04/2024    BUN 24 (H) 10/04/2024    CO2 27 10/04/2024    INR 1.1 04/16/2024    HGBA1C 4.2 04/16/2024     par    ASSESSMENT AND PLAN:    Mr. Ellison is a 19 y.o. male  who is here for follow up s/p kidney transplant.    TRANSPLANT DATE: 4/25/2024 (Kidney)      1. ESRD S/P kidney transplant   - " Creatinine last check was :  Lab Results   Component Value Date    CREATININE 1.30 10/04/2024       - Renal allograft function is stable.   -Allosure last check was WNL  -Ensure adequate hydration  - Avoid nephrotoxic medications, NSAIDs, and IV contrast.    2. Immunosuppression  -Continue current immunosuppression regimen.    3. Electrolytes  Lab Results   Component Value Date    GLUCOSE 92 10/04/2024    CALCIUM 10.1 10/04/2024     10/04/2024    K 4.5 10/04/2024    CO2 27 10/04/2024     10/04/2024    BUN 24 (H) 10/04/2024    CREATININE 1.30 10/04/2024     -Acceptable from last lab drawn    4. Hypertension  Blood Pressures         10/8/2024  1202             BP: 116/80          -Home  BP had been acceptable  -Encourage to monitor home BP  -Continue current anti hypertensive medication    5. Bone Mineral Disease/Osteoporosis  Lab Results   Component Value Date    PTH 74.1 08/26/2024    CALCIUM 10.1 10/04/2024    PHOS 4.0 10/04/2024    VITD25 20 (L) 08/26/2024     -check VIT D, PTH with next lab  - Consider DEXA every 2-3 years , defer to PCP  - May consider initiation of Sensipar when PTH >300 AND Ca >8.4    6.Anemia  Lab Results   Component Value Date    WBC 3.8 (L) 10/04/2024    HGB 13.6 10/04/2024    HCT 43.4 10/04/2024    MCV 91 10/04/2024     10/04/2024     Lab Results   Component Value Date    IRON 159 (H) 08/26/2024    TIBC 282 08/26/2024    FERRITIN 472 (H) 08/26/2024     -asymptomatic  - Continue to monitor  -check iron studies and ferritin. Will consider TUTU as needed.  - No indications for blood transfusion     7.Health maintenance and vaccination  - Flu shot during flu season annually  - Cancer screening is up to date per the patient    summary:  GI plans to do Vedolizumab. Noted it has drug interaction with tacrolimus (Risk Rating- Class C (monitor therapy).  may decrease the serum concentration of CYP Substrates (Narrow Therapeutic Index/Sensitive with Inducers).   Discussed Envarsus  XR as an option. Kelvin takes tacrolimus IR at 4 am/4pm.  STOP Bactrim ( approaching 6 months in 1-2 weeks and low wbc)  Monitor WBC  5.   Lab weekey    RTC 1 month(s)    Vish Dutton    Transplant Nephrology

## 2024-10-09 ENCOUNTER — PHARMACY VISIT (OUTPATIENT)
Dept: PHARMACY | Facility: CLINIC | Age: 19
End: 2024-10-09
Payer: COMMERCIAL

## 2024-10-09 LAB
ALLOSURE SCORE - KIDNEY: 0.17 %
CAREDX_ORDER_ID: NORMAL
CENTER_ORDER_ID: NORMAL
CLIENT SPECIMEN ID - ALLOSURE: NORMAL
DONOR RELATION - ALLOSURE: NORMAL
NOTES - ALLOSURE: NORMAL
RELATIVE CHANGE VALUE - KIDNEY: NORMAL %
TEST COMMENTS - ALLOSURE: NORMAL
TIME POST TX - ALLOSURE: NORMAL
TRANSPLANTED ORGAN - ALLOSURE: NORMAL
TX DATE - ALLOSURE/ALLOMAP: NORMAL
WP_ORDER_ID: NORMAL

## 2024-10-10 ENCOUNTER — DOCUMENTATION (OUTPATIENT)
Dept: TRANSPLANT | Facility: HOSPITAL | Age: 19
End: 2024-10-10
Payer: COMMERCIAL

## 2024-10-11 ENCOUNTER — LAB (OUTPATIENT)
Dept: LAB | Facility: LAB | Age: 19
End: 2024-10-11
Payer: COMMERCIAL

## 2024-10-11 DIAGNOSIS — Z94.0 KIDNEY REPLACED BY TRANSPLANT (HHS-HCC): ICD-10-CM

## 2024-10-11 LAB
ALBUMIN SERPL BCP-MCNC: 4.9 G/DL (ref 3.4–5)
ANION GAP SERPL CALC-SCNC: 17 MMOL/L (ref 10–20)
BUN SERPL-MCNC: 27 MG/DL (ref 6–23)
CALCIUM SERPL-MCNC: 10 MG/DL (ref 8.6–10.6)
CHLORIDE SERPL-SCNC: 103 MMOL/L (ref 98–107)
CO2 SERPL-SCNC: 26 MMOL/L (ref 21–32)
CREAT SERPL-MCNC: 1.22 MG/DL (ref 0.5–1.3)
EGFRCR SERPLBLD CKD-EPI 2021: 87 ML/MIN/1.73M*2
ERYTHROCYTE [DISTWIDTH] IN BLOOD BY AUTOMATED COUNT: 12 % (ref 11.5–14.5)
GLUCOSE SERPL-MCNC: 70 MG/DL (ref 74–99)
HCT VFR BLD AUTO: 42.2 % (ref 41–52)
HGB BLD-MCNC: 13.2 G/DL (ref 13.5–17.5)
MAGNESIUM SERPL-MCNC: 2.03 MG/DL (ref 1.6–2.4)
MCH RBC QN AUTO: 28.2 PG (ref 26–34)
MCHC RBC AUTO-ENTMCNC: 31.3 G/DL (ref 32–36)
MCV RBC AUTO: 90 FL (ref 80–100)
NRBC BLD-RTO: 0 /100 WBCS (ref 0–0)
PHOSPHATE SERPL-MCNC: 4.2 MG/DL (ref 2.5–4.9)
PLATELET # BLD AUTO: 253 X10*3/UL (ref 150–450)
POTASSIUM SERPL-SCNC: 4.3 MMOL/L (ref 3.5–5.3)
RBC # BLD AUTO: 4.68 X10*6/UL (ref 4.5–5.9)
SODIUM SERPL-SCNC: 142 MMOL/L (ref 136–145)
TACROLIMUS BLD-MCNC: 8 NG/ML
WBC # BLD AUTO: 3.8 X10*3/UL (ref 4.4–11.3)

## 2024-10-11 PROCEDURE — 85027 COMPLETE CBC AUTOMATED: CPT

## 2024-10-11 PROCEDURE — 83735 ASSAY OF MAGNESIUM: CPT

## 2024-10-11 PROCEDURE — 80069 RENAL FUNCTION PANEL: CPT

## 2024-10-11 PROCEDURE — 36415 COLL VENOUS BLD VENIPUNCTURE: CPT

## 2024-10-11 PROCEDURE — 80197 ASSAY OF TACROLIMUS: CPT

## 2024-10-17 ENCOUNTER — LAB (OUTPATIENT)
Dept: LAB | Facility: LAB | Age: 19
End: 2024-10-17
Payer: COMMERCIAL

## 2024-10-17 DIAGNOSIS — Z94.0 KIDNEY REPLACED BY TRANSPLANT (HHS-HCC): ICD-10-CM

## 2024-10-17 LAB
ALBUMIN SERPL BCP-MCNC: 4.6 G/DL (ref 3.4–5)
ANION GAP SERPL CALC-SCNC: 18 MMOL/L (ref 10–20)
BUN SERPL-MCNC: 21 MG/DL (ref 6–23)
CALCIUM SERPL-MCNC: 9.8 MG/DL (ref 8.6–10.6)
CHLORIDE SERPL-SCNC: 104 MMOL/L (ref 98–107)
CO2 SERPL-SCNC: 22 MMOL/L (ref 21–32)
CREAT SERPL-MCNC: 1.22 MG/DL (ref 0.5–1.3)
EGFRCR SERPLBLD CKD-EPI 2021: 87 ML/MIN/1.73M*2
ERYTHROCYTE [DISTWIDTH] IN BLOOD BY AUTOMATED COUNT: 12.1 % (ref 11.5–14.5)
GLUCOSE SERPL-MCNC: 102 MG/DL (ref 74–99)
HCT VFR BLD AUTO: 40.5 % (ref 41–52)
HGB BLD-MCNC: 12.8 G/DL (ref 13.5–17.5)
MAGNESIUM SERPL-MCNC: 1.91 MG/DL (ref 1.6–2.4)
MCH RBC QN AUTO: 28.5 PG (ref 26–34)
MCHC RBC AUTO-ENTMCNC: 31.6 G/DL (ref 32–36)
MCV RBC AUTO: 90 FL (ref 80–100)
NRBC BLD-RTO: 0 /100 WBCS (ref 0–0)
PHOSPHATE SERPL-MCNC: 4.3 MG/DL (ref 2.5–4.9)
PLATELET # BLD AUTO: 264 X10*3/UL (ref 150–450)
POTASSIUM SERPL-SCNC: 4.5 MMOL/L (ref 3.5–5.3)
RBC # BLD AUTO: 4.49 X10*6/UL (ref 4.5–5.9)
SODIUM SERPL-SCNC: 139 MMOL/L (ref 136–145)
TACROLIMUS BLD-MCNC: 7.6 NG/ML
WBC # BLD AUTO: 4.1 X10*3/UL (ref 4.4–11.3)

## 2024-10-17 PROCEDURE — 36415 COLL VENOUS BLD VENIPUNCTURE: CPT

## 2024-10-17 PROCEDURE — 80069 RENAL FUNCTION PANEL: CPT

## 2024-10-17 PROCEDURE — 85027 COMPLETE CBC AUTOMATED: CPT

## 2024-10-17 PROCEDURE — 83735 ASSAY OF MAGNESIUM: CPT

## 2024-10-17 PROCEDURE — 80197 ASSAY OF TACROLIMUS: CPT

## 2024-10-24 ENCOUNTER — LAB (OUTPATIENT)
Dept: LAB | Facility: LAB | Age: 19
End: 2024-10-24
Payer: COMMERCIAL

## 2024-10-24 DIAGNOSIS — Z94.0 KIDNEY REPLACED BY TRANSPLANT (HHS-HCC): ICD-10-CM

## 2024-10-24 LAB
ALBUMIN SERPL BCP-MCNC: 5.1 G/DL (ref 3.4–5)
ANION GAP SERPL CALC-SCNC: 14 MMOL/L (ref 10–20)
BUN SERPL-MCNC: 22 MG/DL (ref 6–23)
CALCIUM SERPL-MCNC: 10.1 MG/DL (ref 8.6–10.6)
CHLORIDE SERPL-SCNC: 103 MMOL/L (ref 98–107)
CO2 SERPL-SCNC: 28 MMOL/L (ref 21–32)
CREAT SERPL-MCNC: 1.25 MG/DL (ref 0.5–1.3)
EGFRCR SERPLBLD CKD-EPI 2021: 85 ML/MIN/1.73M*2
GLUCOSE SERPL-MCNC: 84 MG/DL (ref 74–99)
MAGNESIUM SERPL-MCNC: 1.96 MG/DL (ref 1.6–2.4)
PHOSPHATE SERPL-MCNC: 4.3 MG/DL (ref 2.5–4.9)
POTASSIUM SERPL-SCNC: 4.4 MMOL/L (ref 3.5–5.3)
SODIUM SERPL-SCNC: 141 MMOL/L (ref 136–145)

## 2024-10-24 PROCEDURE — 80069 RENAL FUNCTION PANEL: CPT

## 2024-10-24 PROCEDURE — 36415 COLL VENOUS BLD VENIPUNCTURE: CPT

## 2024-10-24 PROCEDURE — 85027 COMPLETE CBC AUTOMATED: CPT

## 2024-10-24 PROCEDURE — 83735 ASSAY OF MAGNESIUM: CPT

## 2024-10-24 PROCEDURE — 80197 ASSAY OF TACROLIMUS: CPT

## 2024-10-25 LAB
ERYTHROCYTE [DISTWIDTH] IN BLOOD BY AUTOMATED COUNT: 12.3 % (ref 11.5–14.5)
HCT VFR BLD AUTO: 44.6 % (ref 41–52)
HGB BLD-MCNC: 14 G/DL (ref 13.5–17.5)
MCH RBC QN AUTO: 28.5 PG (ref 26–34)
MCHC RBC AUTO-ENTMCNC: 31.4 G/DL (ref 32–36)
MCV RBC AUTO: 91 FL (ref 80–100)
NRBC BLD-RTO: 0 /100 WBCS (ref 0–0)
PLATELET # BLD AUTO: 259 X10*3/UL (ref 150–450)
RBC # BLD AUTO: 4.91 X10*6/UL (ref 4.5–5.9)
TACROLIMUS BLD-MCNC: 7.6 NG/ML
WBC # BLD AUTO: 4.8 X10*3/UL (ref 4.4–11.3)

## 2024-11-07 ENCOUNTER — LAB (OUTPATIENT)
Dept: LAB | Facility: LAB | Age: 19
End: 2024-11-07
Payer: COMMERCIAL

## 2024-11-07 DIAGNOSIS — Z94.0 KIDNEY REPLACED BY TRANSPLANT (HHS-HCC): ICD-10-CM

## 2024-11-07 LAB
ALBUMIN SERPL BCP-MCNC: 4.6 G/DL (ref 3.4–5)
ANION GAP SERPL CALC-SCNC: 13 MMOL/L (ref 10–20)
BUN SERPL-MCNC: 22 MG/DL (ref 6–23)
CALCIUM SERPL-MCNC: 9.8 MG/DL (ref 8.6–10.6)
CHLORIDE SERPL-SCNC: 103 MMOL/L (ref 98–107)
CO2 SERPL-SCNC: 29 MMOL/L (ref 21–32)
CREAT SERPL-MCNC: 1.2 MG/DL (ref 0.5–1.3)
EGFRCR SERPLBLD CKD-EPI 2021: 89 ML/MIN/1.73M*2
ERYTHROCYTE [DISTWIDTH] IN BLOOD BY AUTOMATED COUNT: 12.6 % (ref 11.5–14.5)
GLUCOSE SERPL-MCNC: 74 MG/DL (ref 74–99)
HCT VFR BLD AUTO: 44.5 % (ref 41–52)
HGB BLD-MCNC: 13.6 G/DL (ref 13.5–17.5)
MAGNESIUM SERPL-MCNC: 2.07 MG/DL (ref 1.6–2.4)
MCH RBC QN AUTO: 27.9 PG (ref 26–34)
MCHC RBC AUTO-ENTMCNC: 30.6 G/DL (ref 32–36)
MCV RBC AUTO: 91 FL (ref 80–100)
NRBC BLD-RTO: 0 /100 WBCS (ref 0–0)
PHOSPHATE SERPL-MCNC: 3.9 MG/DL (ref 2.5–4.9)
PLATELET # BLD AUTO: 272 X10*3/UL (ref 150–450)
POTASSIUM SERPL-SCNC: 4.9 MMOL/L (ref 3.5–5.3)
RBC # BLD AUTO: 4.87 X10*6/UL (ref 4.5–5.9)
SODIUM SERPL-SCNC: 140 MMOL/L (ref 136–145)
WBC # BLD AUTO: 5 X10*3/UL (ref 4.4–11.3)

## 2024-11-07 PROCEDURE — 36415 COLL VENOUS BLD VENIPUNCTURE: CPT

## 2024-11-07 PROCEDURE — 83735 ASSAY OF MAGNESIUM: CPT

## 2024-11-07 PROCEDURE — 85027 COMPLETE CBC AUTOMATED: CPT

## 2024-11-07 PROCEDURE — 80069 RENAL FUNCTION PANEL: CPT

## 2024-11-07 PROCEDURE — 80197 ASSAY OF TACROLIMUS: CPT

## 2024-11-08 ENCOUNTER — OFFICE VISIT (OUTPATIENT)
Dept: TRANSPLANT | Facility: HOSPITAL | Age: 19
End: 2024-11-08
Payer: COMMERCIAL

## 2024-11-08 VITALS
HEART RATE: 101 BPM | SYSTOLIC BLOOD PRESSURE: 121 MMHG | DIASTOLIC BLOOD PRESSURE: 86 MMHG | TEMPERATURE: 97 F | OXYGEN SATURATION: 98 % | BODY MASS INDEX: 16.56 KG/M2 | WEIGHT: 102.6 LBS

## 2024-11-08 DIAGNOSIS — K50.10 CROHN'S DISEASE OF COLON WITHOUT COMPLICATION (MULTI): Primary | ICD-10-CM

## 2024-11-08 DIAGNOSIS — Z92.25 PERSONAL HISTORY OF IMMUNOSUPRESSION THERAPY: ICD-10-CM

## 2024-11-08 DIAGNOSIS — Z94.0 KIDNEY REPLACED BY TRANSPLANT (HHS-HCC): ICD-10-CM

## 2024-11-08 DIAGNOSIS — Z51.81 THERAPEUTIC DRUG MONITORING: ICD-10-CM

## 2024-11-08 LAB — TACROLIMUS BLD-MCNC: 7.3 NG/ML

## 2024-11-08 PROCEDURE — 99215 OFFICE O/P EST HI 40 MIN: CPT

## 2024-11-08 PROCEDURE — G2211 COMPLEX E/M VISIT ADD ON: HCPCS

## 2024-11-08 RX ORDER — LANOLIN ALCOHOL/MO/W.PET/CERES
1000 CREAM (GRAM) TOPICAL DAILY
Qty: 30 TABLET | Refills: 11 | Status: CANCELLED | OUTPATIENT
Start: 2024-11-08 | End: 2025-11-08

## 2024-11-08 RX ORDER — ACETAMINOPHEN 500 MG
10000 TABLET ORAL WEEKLY
COMMUNITY
End: 2025-03-31 | Stop reason: ALTCHOICE

## 2024-11-08 SDOH — ECONOMIC STABILITY: FOOD INSECURITY: WITHIN THE PAST 12 MONTHS, YOU WORRIED THAT YOUR FOOD WOULD RUN OUT BEFORE YOU GOT MONEY TO BUY MORE.: NEVER TRUE

## 2024-11-08 SDOH — ECONOMIC STABILITY: FOOD INSECURITY: WITHIN THE PAST 12 MONTHS, THE FOOD YOU BOUGHT JUST DIDN'T LAST AND YOU DIDN'T HAVE MONEY TO GET MORE.: NEVER TRUE

## 2024-11-08 ASSESSMENT — ENCOUNTER SYMPTOMS
HEADACHES: 0
ABDOMINAL DISTENTION: 0
OCCASIONAL FEELINGS OF UNSTEADINESS: 0
CONFUSION: 0
PALPITATIONS: 0
HEMATURIA: 0
NERVOUS/ANXIOUS: 0
LOSS OF SENSATION IN FEET: 0
CHEST TIGHTNESS: 0
VOMITING: 0
BACK PAIN: 0
SHORTNESS OF BREATH: 0
COUGH: 0
DIARRHEA: 0
NAUSEA: 0
FREQUENCY: 0
DEPRESSION: 0

## 2024-11-08 ASSESSMENT — LIFESTYLE VARIABLES
HOW MANY STANDARD DRINKS CONTAINING ALCOHOL DO YOU HAVE ON A TYPICAL DAY: PATIENT DOES NOT DRINK
HOW OFTEN DO YOU HAVE SIX OR MORE DRINKS ON ONE OCCASION: NEVER
AUDIT-C TOTAL SCORE: 0
SKIP TO QUESTIONS 9-10: 1
HOW OFTEN DO YOU HAVE A DRINK CONTAINING ALCOHOL: NEVER

## 2024-11-08 ASSESSMENT — PATIENT HEALTH QUESTIONNAIRE - PHQ9
SUM OF ALL RESPONSES TO PHQ9 QUESTIONS 1 AND 2: 0
1. LITTLE INTEREST OR PLEASURE IN DOING THINGS: NOT AT ALL
2. FEELING DOWN, DEPRESSED OR HOPELESS: NOT AT ALL

## 2024-11-08 ASSESSMENT — PAIN SCALES - GENERAL: PAINLEVEL_OUTOF10: 0-NO PAIN

## 2024-11-08 NOTE — PATIENT INSTRUCTIONS
PLAN:  Start Envarsus 6 mg daily  No probiotic supplementation: get through diet such as yogurts  Get flu shot and B 12 shot  Increase vitamin B 12 in diet as well: meat, fish, eggs, cheese, and milk, as well as fortified plant-based foods  Labs every week after starting Envarsus  RTC 1 m

## 2024-11-08 NOTE — PROGRESS NOTES
Trever Ellison  19-year-old with a history of congenital hydronephrosis and ESRD secondary to chronic Remicade use secondary to chron's disease s/p living related kidney transplant done on 4/25/2024. PRA 0%, hepatitis C viral status D-/R- CMV D-/R-, hepatitis B virus D-/R-. Patient had a spontaneous graft function with no significant posttransplant complications.      Denied any complaints on today's visit.  Had gastroenterology appointment later this month for starting up Entyvio with him.  Also complaining of some neuropathic changes in the lower extremities which I discussed about transitioning from tacrolimus to Envarsus.      Review of Systems   Respiratory:  Negative for cough, chest tightness and shortness of breath.    Cardiovascular:  Negative for chest pain, palpitations and leg swelling.   Gastrointestinal:  Negative for abdominal distention, diarrhea, nausea and vomiting.   Genitourinary:  Negative for frequency, hematuria and urgency.   Musculoskeletal:  Negative for back pain.   Neurological:  Negative for headaches.   Psychiatric/Behavioral:  Negative for confusion. The patient is not nervous/anxious.         Objective:  Visit Vitals  /86   Pulse 101   Temp 36.1 °C (97 °F) (Temporal)   Wt 46.5 kg (102 lb 9.6 oz)   SpO2 98%   BMI 16.56 kg/m²   Smoking Status Never   BSA 1.47 m²      Physical Exam  HENT:      Head: Normocephalic and atraumatic.      Nose: Nose normal.      Mouth/Throat:      Mouth: Mucous membranes are moist.   Eyes:      Extraocular Movements: Extraocular movements intact.      Pupils: Pupils are equal, round, and reactive to light.   Cardiovascular:      Rate and Rhythm: Normal rate.      Pulses: Normal pulses.      Heart sounds: Normal heart sounds.   Pulmonary:      Effort: Pulmonary effort is normal.   Abdominal:      Palpations: Abdomen is soft.      Tenderness: There is no abdominal tenderness. There is no guarding.   Musculoskeletal:         General: Normal range of motion.       Cervical back: Normal range of motion.   Skin:     General: Skin is warm.   Neurological:      General: No focal deficit present.      Mental Status: Mental status is at baseline.   Psychiatric:         Mood and Affect: Mood normal.            Current Outpatient Medications:     amitriptyline (Elavil) 10 mg tablet, TAKE 1 TABLET (10 MG) BY MOUTH ONCE DAILY AT BEDTIME., Disp: 90 tablet, Rfl: 4    mycophenolate (Myfortic) 180 mg EC tablet, Take 3 tablets (540 mg) by mouth 2 times a day., Disp: 180 tablet, Rfl: 11    predniSONE (Deltasone) 5 mg tablet, Take 1 tablet (5 mg) by mouth once daily., Disp: 30 tablet, Rfl: 11    tacrolimus (Prograf) 0.5 mg capsule, Take 1 capsule (0.5 mg) by mouth 2 times a day., Disp: 180 capsule, Rfl: 3    tacrolimus (Prograf) 1 mg capsule, Take 3 capsules (3 mg) by mouth every 12 hours., Disp: 540 capsule, Rfl: 3    cholecalciferol (Vitamin D3) 50 mcg (2,000 unit) capsule, Take 5 capsules (250 mcg) by mouth 1 (one) time per week., Disp: , Rfl:     vedolizumab (Entyvio) 300 mg injection, Infuse 300 mg into a venous catheter 1 time., Disp: , Rfl:      [unfilled]     No images are attached to the encounter.     Assessment and Plan :Trever Ellison 19-year-old with a history of congenital hydronephrosis and ESRD secondary to chronic Remicade use secondary to chron's disease s/p living related kidney transplant done on 4/25/2024. PRA 0%, hepatitis C viral status D-/R- CMV D-/R-, hepatitis B virus D-/R-. Patient had a spontaneous graft function with no significant posttransplant complications.      Denied any complaints on today's visit.  Had gastroenterology appointment later this month for starting up Entyvio with him.  Also complaining of some neuropathic changes in the lower extremities which I discussed about transitioning from tacrolimus to Envarsus.    Allograft function: Stable creatinine in the range of 1.2-1.4, stable electrolytes.  Last UPC is 0.48 we will follow-up  with repeat.  AlloSure is stable at 0.08.  -Infectious workup like EBV, BK, CMV negative as of 8/26/2024    Immunosuppression: Transition from tacrolimus to Envarsus currently on 3.5 mg twice daily will do 5 mg Envarsus, prednisone 5 mg mycophenolate Myfortic that is 540 mg twice daily.    Hemodynamics: Pressures are optimally controlled continue with the current regimen.    No anemia or leukopenia.  Seems B12 deficient likely due to oral absorption recommended intramuscular B12.    Bone mineral disease calcium and phosphorus okay prior vitamin D deficient will recheck vitamin D and PTH    General health care: Recommended age-appropriate screening, COVID shots annual dermatology visits,DEXA scan 2-3 yrs while on steroids .    Labs every other week and follow-up in a month    Santa Singleton MD    Notes created by Lawrence -Please excuse the Typos .

## 2024-11-11 ENCOUNTER — DOCUMENTATION (OUTPATIENT)
Dept: TRANSPLANT | Facility: HOSPITAL | Age: 19
End: 2024-11-11
Payer: COMMERCIAL

## 2024-11-12 ENCOUNTER — APPOINTMENT (OUTPATIENT)
Dept: PRIMARY CARE | Facility: CLINIC | Age: 19
End: 2024-11-12
Payer: COMMERCIAL

## 2024-11-12 ENCOUNTER — TELEPHONE (OUTPATIENT)
Dept: PRIMARY CARE | Facility: CLINIC | Age: 19
End: 2024-11-12

## 2024-11-12 DIAGNOSIS — E53.8 B12 DEFICIENCY: ICD-10-CM

## 2024-11-12 DIAGNOSIS — Z23 IMMUNIZATION DUE: Primary | ICD-10-CM

## 2024-11-12 PROCEDURE — 99024 POSTOP FOLLOW-UP VISIT: CPT | Performed by: FAMILY MEDICINE

## 2024-11-12 PROCEDURE — 90471 IMMUNIZATION ADMIN: CPT | Performed by: FAMILY MEDICINE

## 2024-11-12 PROCEDURE — 90656 IIV3 VACC NO PRSV 0.5 ML IM: CPT | Performed by: FAMILY MEDICINE

## 2024-11-12 PROCEDURE — 1036F TOBACCO NON-USER: CPT | Performed by: FAMILY MEDICINE

## 2024-11-12 PROCEDURE — 96372 THER/PROPH/DIAG INJ SC/IM: CPT | Performed by: FAMILY MEDICINE

## 2024-11-12 RX ORDER — CYANOCOBALAMIN 1000 UG/ML
1000 INJECTION, SOLUTION INTRAMUSCULAR; SUBCUTANEOUS ONCE
Status: COMPLETED | OUTPATIENT
Start: 2024-11-12 | End: 2024-11-12

## 2024-11-12 NOTE — PROGRESS NOTES
Administered B12 injection to right deltoid muscle.   Administered low dose influenza vaccine to left deltoid muscle.  Pt took well to both injections.

## 2024-11-12 NOTE — TELEPHONE ENCOUNTER
Mother asked if he can have b-12 injection today with his flu vaccine, stated the nephrologist asked for it, last lab was 08/24 normal range advise?

## 2024-11-18 NOTE — PROGRESS NOTES
Reviewed labs with Dr Salas. Cr 1.49 from 1.33. Tac now 6.2. Per MD, continue to monitor labs.  
Cough since Saturday and today started feeling abdominal pain and had diarrhea x1 today. Eating and drinking fine, no vomiting but feels slight nausea. Fever today at 101. Tylenol last at 0500. Went to urgent care who said to go to ED for evaluation. Abdomen soft, non-distended, no guarding or point tenderness but states that both lower quadrants hurt. Able to jump up and down. No PMH, IUTD

## 2024-11-20 DIAGNOSIS — R11.15 CYCLIC VOMITING SYNDROME: ICD-10-CM

## 2024-11-20 RX ORDER — AMITRIPTYLINE HYDROCHLORIDE 10 MG/1
10 TABLET, FILM COATED ORAL NIGHTLY
Qty: 90 TABLET | Refills: 3 | Status: SHIPPED | OUTPATIENT
Start: 2024-11-20 | End: 2025-11-20

## 2024-11-22 ENCOUNTER — LAB (OUTPATIENT)
Dept: LAB | Facility: LAB | Age: 19
End: 2024-11-22
Payer: COMMERCIAL

## 2024-11-22 DIAGNOSIS — Z94.0 KIDNEY REPLACED BY TRANSPLANT (HHS-HCC): ICD-10-CM

## 2024-11-22 LAB
ALBUMIN SERPL BCP-MCNC: 5 G/DL (ref 3.4–5)
ANION GAP SERPL CALC-SCNC: 14 MMOL/L (ref 10–20)
BUN SERPL-MCNC: 24 MG/DL (ref 6–23)
CALCIUM SERPL-MCNC: 10.1 MG/DL (ref 8.6–10.6)
CHLORIDE SERPL-SCNC: 104 MMOL/L (ref 98–107)
CO2 SERPL-SCNC: 29 MMOL/L (ref 21–32)
CREAT SERPL-MCNC: 1.28 MG/DL (ref 0.5–1.3)
EGFRCR SERPLBLD CKD-EPI 2021: 82 ML/MIN/1.73M*2
ERYTHROCYTE [DISTWIDTH] IN BLOOD BY AUTOMATED COUNT: 12.8 % (ref 11.5–14.5)
GLUCOSE SERPL-MCNC: 80 MG/DL (ref 74–99)
HCT VFR BLD AUTO: 43.8 % (ref 41–52)
HGB BLD-MCNC: 13.8 G/DL (ref 13.5–17.5)
MAGNESIUM SERPL-MCNC: 1.95 MG/DL (ref 1.6–2.4)
MCH RBC QN AUTO: 27.8 PG (ref 26–34)
MCHC RBC AUTO-ENTMCNC: 31.5 G/DL (ref 32–36)
MCV RBC AUTO: 88 FL (ref 80–100)
NRBC BLD-RTO: 0 /100 WBCS (ref 0–0)
PHOSPHATE SERPL-MCNC: 4.5 MG/DL (ref 2.5–4.9)
PLATELET # BLD AUTO: 294 X10*3/UL (ref 150–450)
POTASSIUM SERPL-SCNC: 4.9 MMOL/L (ref 3.5–5.3)
RBC # BLD AUTO: 4.96 X10*6/UL (ref 4.5–5.9)
SODIUM SERPL-SCNC: 142 MMOL/L (ref 136–145)
WBC # BLD AUTO: 4.4 X10*3/UL (ref 4.4–11.3)

## 2024-11-22 PROCEDURE — 85027 COMPLETE CBC AUTOMATED: CPT

## 2024-11-22 PROCEDURE — 80197 ASSAY OF TACROLIMUS: CPT

## 2024-11-22 PROCEDURE — 80069 RENAL FUNCTION PANEL: CPT

## 2024-11-22 PROCEDURE — 36415 COLL VENOUS BLD VENIPUNCTURE: CPT

## 2024-11-22 PROCEDURE — 83735 ASSAY OF MAGNESIUM: CPT

## 2024-11-23 LAB — TACROLIMUS BLD-MCNC: 8.5 NG/ML

## 2024-11-26 ENCOUNTER — OFFICE VISIT (OUTPATIENT)
Dept: GASTROENTEROLOGY | Facility: HOSPITAL | Age: 19
End: 2024-11-26
Payer: COMMERCIAL

## 2024-11-26 VITALS — WEIGHT: 102 LBS | HEIGHT: 66 IN | BODY MASS INDEX: 16.39 KG/M2

## 2024-11-26 DIAGNOSIS — K50.80 CROHN'S DISEASE OF BOTH SMALL AND LARGE INTESTINE WITHOUT COMPLICATION (MULTI): Primary | ICD-10-CM

## 2024-11-26 PROCEDURE — 3008F BODY MASS INDEX DOCD: CPT | Performed by: INTERNAL MEDICINE

## 2024-11-26 PROCEDURE — 99214 OFFICE O/P EST MOD 30 MIN: CPT | Performed by: INTERNAL MEDICINE

## 2024-11-26 RX ORDER — SODIUM, POTASSIUM,MAG SULFATES 17.5-3.13G
SOLUTION, RECONSTITUTED, ORAL ORAL
Qty: 1 EACH | Refills: 0 | Status: SHIPPED | OUTPATIENT
Start: 2024-11-26

## 2024-11-26 ASSESSMENT — ENCOUNTER SYMPTOMS
HEMATOLOGIC/LYMPHATIC NEGATIVE: 1
RESPIRATORY NEGATIVE: 1
ENDOCRINE NEGATIVE: 1
CARDIOVASCULAR NEGATIVE: 1
EYES NEGATIVE: 1
GASTROINTESTINAL NEGATIVE: 1
CONSTITUTIONAL NEGATIVE: 1
PSYCHIATRIC NEGATIVE: 1
MUSCULOSKELETAL NEGATIVE: 1
NEUROLOGICAL NEGATIVE: 1

## 2024-11-26 NOTE — ASSESSMENT & PLAN NOTE
Patient is a 19-year-old with a complicated history of Crohn's disease treated with a biologic that resulted in kidney failure resulting in a kidney transplant.  He has done well posttransplant and is gaining weight.  He is on immunosuppressive agents for his transplant and has been off Entyvio.  I recommend we pursue colonoscopy to assess the mucosal extent of his disease prior to restarting Entyvio.  Depending on the extent of his disease would then dictate management medication wise for his Crohn's to be also discussed with the transplant team.  He and his mother agree.  They will schedule.    I further discussed the need for increasing nutrition with the goal of 1 g of protein per pound daily.  All other questions are answered.    At some point we will pursue a bone density as well.    He and his mother are requesting that I fill out information for school to give him special bathroom privileges in the setting of his Crohn's which I will.

## 2024-11-26 NOTE — PROGRESS NOTES
"Crohn's disease complicated by renal failure due to Biologics    History of Present Illness:   Trever Ellison \"Kelvin\" is a 19 y.o. male with PMHx of Crohn's disease, renal failure and subsequent kidney transplant and who presents to GI clinic for follow up.  The patient denies any rectal bleeding and had some loose bowel movements over the last 48 hours but overall is doing well.  He denies chronic diarrhea, abdominal pain, fever or chills.  He has done well post transplant on immunosuppressive agents.  Blood work recently showed no anemia with normal renal and liver function.    He has no other complaints and has been off Entyvio since surgery    He denies any fever, chills, severe abdominal pain, or weight loss.    He is currently in school at [employer]    He started to become an     He does not smoke or drink alcohol         Review of Systems  Review of Systems   Constitutional: Negative.    HENT: Negative.     Eyes: Negative.    Respiratory: Negative.     Cardiovascular: Negative.    Gastrointestinal: Negative.    Endocrine: Negative.    Genitourinary: Negative.    Musculoskeletal: Negative.    Skin: Negative.    Neurological: Negative.    Hematological: Negative.    Psychiatric/Behavioral: Negative.         Allergies  Allergies   Allergen Reactions    Cephalexin Rash    Methotrexate Headache, Other and Unknown     \"Did not tolerate well\", per mom. \" He would get sick, it caused nausea and headaches.\"    Adhesive Tape-Silicones Rash     Redness, raw skin       Medications  Current Outpatient Medications   Medication Instructions    amitriptyline (ELAVIL) 10 mg, oral, Nightly    cholecalciferol (VITAMIN D3) 10,000 Units, oral, Weekly    mycophenolate (MYFORTIC) 540 mg, oral, 2 times daily    predniSONE (DELTASONE) 5 mg, oral, Daily    vedolizumab (ENTYVIO) 300 mg, intravenous, Once        Objective   There were no vitals taken for this visit.   Physical Exam  Constitutional:       Appearance: " "Normal appearance.   Eyes:      Conjunctiva/sclera: Conjunctivae normal.   Cardiovascular:      Rate and Rhythm: Normal rate and regular rhythm.   Pulmonary:      Effort: Pulmonary effort is normal.      Breath sounds: Normal breath sounds.   Abdominal:      General: Abdomen is flat. Bowel sounds are normal.      Palpations: Abdomen is soft.   Musculoskeletal:         General: Normal range of motion.      Cervical back: Normal range of motion.   Neurological:      Mental Status: He is alert.           Lab Results   Component Value Date    WBC 4.4 11/22/2024    WBC 5.0 11/07/2024    WBC 4.8 10/24/2024    HGB 13.8 11/22/2024    HGB 13.6 11/07/2024    HGB 14.0 10/24/2024    HCT 43.8 11/22/2024    HCT 44.5 11/07/2024    HCT 44.6 10/24/2024     11/22/2024     11/07/2024     10/24/2024     Lab Results   Component Value Date     11/22/2024     11/07/2024     10/24/2024    K 4.9 11/22/2024    K 4.9 11/07/2024    K 4.4 10/24/2024     11/22/2024     11/07/2024     10/24/2024    CO2 29 11/22/2024    CO2 29 11/07/2024    CO2 28 10/24/2024    BUN 24 (H) 11/22/2024    BUN 22 11/07/2024    BUN 22 10/24/2024    CREATININE 1.28 11/22/2024    CREATININE 1.20 11/07/2024    CREATININE 1.25 10/24/2024    CALCIUM 10.1 11/22/2024    CALCIUM 9.8 11/07/2024    CALCIUM 10.1 10/24/2024    PROT 7.8 04/16/2024    PROT 6.2 (L) 06/22/2023    PROT 8.5 (H) 06/20/2023    BILITOT 0.3 04/16/2024    BILITOT 0.3 06/22/2023    BILITOT 0.4 06/20/2023    ALKPHOS 224 (H) 04/16/2024    ALKPHOS 106 06/22/2023    ALKPHOS 128 (H) 06/20/2023    ALT 17 04/16/2024    ALT 14 06/22/2023    ALT 20 06/20/2023    AST 17 04/16/2024    AST 15 06/22/2023    AST 15 06/20/2023    GLUCOSE 80 11/22/2024    GLUCOSE 74 11/07/2024    GLUCOSE 84 10/24/2024           Trever Ellison \"Kelvin\" is a 19 y.o. male who presents to GI clinic for Crohn's disease status post renal transplant for renal failure.    Crohn's disease " (Multi)  Patient is a 19-year-old with a complicated history of Crohn's disease treated with a biologic that resulted in kidney failure resulting in a kidney transplant.  He has done well posttransplant and is gaining weight.  He is on immunosuppressive agents for his transplant and has been off Entyvio.  I recommend we pursue colonoscopy to assess the mucosal extent of his disease prior to restarting Entyvio.  Depending on the extent of his disease would then dictate management medication wise for his Crohn's to be also discussed with the transplant team.  He and his mother agree.  They will schedule.    I further discussed the need for increasing nutrition with the goal of 1 g of protein per pound daily.  All other questions are answered.    At some point we will pursue a bone density as well.    He and his mother are requesting that I fill out information for school to give him special bathroom privileges in the setting of his Crohn's which I will.       Vipin Hermosillo MD

## 2024-12-09 ENCOUNTER — LAB (OUTPATIENT)
Dept: LAB | Facility: LAB | Age: 19
End: 2024-12-09
Payer: COMMERCIAL

## 2024-12-09 DIAGNOSIS — Z94.0 KIDNEY REPLACED BY TRANSPLANT (HHS-HCC): ICD-10-CM

## 2024-12-09 LAB
ERYTHROCYTE [DISTWIDTH] IN BLOOD BY AUTOMATED COUNT: 12.9 % (ref 11.5–14.5)
HCT VFR BLD AUTO: 44.1 % (ref 41–52)
HGB BLD-MCNC: 13.9 G/DL (ref 13.5–17.5)
MCH RBC QN AUTO: 28.1 PG (ref 26–34)
MCHC RBC AUTO-ENTMCNC: 31.5 G/DL (ref 32–36)
MCV RBC AUTO: 89 FL (ref 80–100)
NRBC BLD-RTO: 0 /100 WBCS (ref 0–0)
PLATELET # BLD AUTO: 305 X10*3/UL (ref 150–450)
RBC # BLD AUTO: 4.95 X10*6/UL (ref 4.5–5.9)
WBC # BLD AUTO: 4.4 X10*3/UL (ref 4.4–11.3)

## 2024-12-09 PROCEDURE — 83735 ASSAY OF MAGNESIUM: CPT

## 2024-12-09 PROCEDURE — 80197 ASSAY OF TACROLIMUS: CPT

## 2024-12-09 PROCEDURE — 80069 RENAL FUNCTION PANEL: CPT

## 2024-12-09 PROCEDURE — 85027 COMPLETE CBC AUTOMATED: CPT

## 2024-12-09 PROCEDURE — 36415 COLL VENOUS BLD VENIPUNCTURE: CPT

## 2024-12-10 ENCOUNTER — APPOINTMENT (OUTPATIENT)
Dept: TRANSPLANT | Facility: HOSPITAL | Age: 19
End: 2024-12-10
Payer: COMMERCIAL

## 2024-12-10 VITALS
HEART RATE: 101 BPM | BODY MASS INDEX: 17.11 KG/M2 | TEMPERATURE: 97.4 F | DIASTOLIC BLOOD PRESSURE: 74 MMHG | WEIGHT: 106 LBS | OXYGEN SATURATION: 97 % | SYSTOLIC BLOOD PRESSURE: 104 MMHG

## 2024-12-10 DIAGNOSIS — Z51.81 THERAPEUTIC DRUG MONITORING: ICD-10-CM

## 2024-12-10 DIAGNOSIS — Z92.25 PERSONAL HISTORY OF IMMUNOSUPRESSION THERAPY: ICD-10-CM

## 2024-12-10 DIAGNOSIS — E55.9 VITAMIN D DEFICIENCY: ICD-10-CM

## 2024-12-10 DIAGNOSIS — D84.9 IMMUNOSUPPRESSION: ICD-10-CM

## 2024-12-10 DIAGNOSIS — Z94.0 IMMUNOSUPPRESSIVE MANAGEMENT ENCOUNTER FOLLOWING KIDNEY TRANSPLANT: ICD-10-CM

## 2024-12-10 DIAGNOSIS — Z79.899 IMMUNOSUPPRESSIVE MANAGEMENT ENCOUNTER FOLLOWING KIDNEY TRANSPLANT: ICD-10-CM

## 2024-12-10 DIAGNOSIS — Z94.0 KIDNEY REPLACED BY TRANSPLANT (HHS-HCC): Primary | ICD-10-CM

## 2024-12-10 LAB
ALBUMIN SERPL BCP-MCNC: 4.9 G/DL (ref 3.4–5)
ANION GAP SERPL CALC-SCNC: 15 MMOL/L (ref 10–20)
BUN SERPL-MCNC: 22 MG/DL (ref 6–23)
CALCIUM SERPL-MCNC: 10.1 MG/DL (ref 8.6–10.6)
CHLORIDE SERPL-SCNC: 103 MMOL/L (ref 98–107)
CO2 SERPL-SCNC: 28 MMOL/L (ref 21–32)
CREAT SERPL-MCNC: 1.16 MG/DL (ref 0.5–1.3)
EGFRCR SERPLBLD CKD-EPI 2021: >90 ML/MIN/1.73M*2
GLUCOSE SERPL-MCNC: 73 MG/DL (ref 74–99)
MAGNESIUM SERPL-MCNC: 2.18 MG/DL (ref 1.6–2.4)
PHOSPHATE SERPL-MCNC: 3.6 MG/DL (ref 2.5–4.9)
POTASSIUM SERPL-SCNC: 5.2 MMOL/L (ref 3.5–5.3)
SODIUM SERPL-SCNC: 141 MMOL/L (ref 136–145)
TACROLIMUS BLD-MCNC: 7.9 NG/ML

## 2024-12-10 PROCEDURE — 99215 OFFICE O/P EST HI 40 MIN: CPT

## 2024-12-10 PROCEDURE — G2211 COMPLEX E/M VISIT ADD ON: HCPCS

## 2024-12-10 RX ORDER — FLUTICASONE PROPIONATE 50 MCG
1 SPRAY, SUSPENSION (ML) NASAL DAILY
Qty: 16 G | Refills: 12 | Status: SHIPPED | OUTPATIENT
Start: 2024-12-10 | End: 2025-12-10

## 2024-12-10 ASSESSMENT — PAIN SCALES - GENERAL: PAINLEVEL_OUTOF10: 0-NO PAIN

## 2024-12-10 NOTE — PROGRESS NOTES
TRANSPLANT NEPHROLOGY :   OUTPATIENT CLINIC NOTE      SERVICE DATE : 12/10/2024    REASON FOR VISIT/CHIEF COMPLAINT:  S/P  TRANSPLANT SURGERY  IMMUNOSUPPRESSIVE MEDICATION MANAGEMENT  BLOOD PRESSURE MANAGEMENT    HPI:    Mr. Ellison is a 19 y.o. male with past medical history significant for congenital hydronephrosis and ESRD secondary to chronic Remicade use secondary to Crohn disease s/p living related kidney transplant done on 4/25/2024. PRA 0%, hepatitis C viral status D-/R- CMV D-/R-, hepatitis B virus D-/R-. Patient had a spontaneous graft function with no significant posttransplant complications.         Doing well in general. Mild frontal HA about twice a week associated with nasal congestion. Recently had a common cold about 2 weeks ago.    Repeat colonoscopy is coming up to monitor Crohn disease activity. He denies new GI symptoms.    No problem urinating.  Does not check home BP but his office BP has always been good.  Compliant with medications.   In the process of switching to Envarsus    Patient is doing well overall. No new complaints. Denied chest pain, SOB, CH, Palpitation. Normal urination and bowel movement. Normal gait and no weakness of arms/legs. No cough, runny nose, sore throat, cold symptoms, or rash. No hearing loss. Normal vision.No problems with his sleep, mood and function. No recent infection, hospitalization, surgery or ER visits.      ROS:  Review of  14 systems was performed system by system. See HPI. Otherwise, the symptoms were negative.    PAST MEDICAL HISTORY:  Past Medical History:   Diagnosis Date    Acute Crohn's disease (Multi)     colitis    Aluminum bone disease     Anemia     Angina pectoris     Cachexia (Multi)     End stage chronic kidney disease (Multi)     Dialysis    Hemodialysis patient (CMS-HCC)     Hydronephrosis     1 yo    Nephritis     Tachycardia     Vitamin D deficiency         PAST SURGICAL HISTORY:  Past Surgical History:   Procedure Laterality Date     COLON SURGERY  2014    crohns    COLONOSCOPY      CT GUIDED CHEST TUBE PLACEMENT  06/28/2023    CT GUIDED CHEST TUBE PLACEMENT 6/28/2023 [facility]    CT GUIDED PERCUTANEOUS BIOPSY KIDNEY  2023    CT GUIDED PERCUTANEOUS BIOPSY LUNG  06/28/2023    CT GUIDED PERCUTANEOUS BIOPSY LUNG 6/28/2023 [facility]    IR TUNNELED DIALYSIS CATHETER  2023    LUNG BIOPSY  2023    UPPER GASTROINTESTINAL ENDOSCOPY          SOCIAL HISTORY:  Social History     Socioeconomic History    Marital status: Single     Spouse name: Not on file    Number of children: Not on file    Years of education: Not on file    Highest education level: Not on file   Occupational History    Not on file   Tobacco Use    Smoking status: Never    Smokeless tobacco: Never   Vaping Use    Vaping status: Never Used   Substance and Sexual Activity    Alcohol use: Never    Drug use: Never    Sexual activity: Not Currently   Other Topics Concern    Not on file   Social History Narrative    Not on file     Social Drivers of Health     Financial Resource Strain: Not on file   Food Insecurity: No Food Insecurity (11/8/2024)    Hunger Vital Sign     Worried About Running Out of Food in the Last Year: Never true     Ran Out of Food in the Last Year: Never true   Transportation Needs: Not on file   Physical Activity: Not on file   Stress: Not on file   Social Connections: Not on file   Intimate Partner Violence: Not on file   Housing Stability: Not on file       FAMILY HISTORY:  Family History   Problem Relation Name Age of Onset    Graves' disease Mother      Hypertension Father      Kidney disease Brother      Heart disease Maternal Grandmother      Hypertension Maternal Grandfather      COPD Paternal Grandmother      Heart disease Paternal Grandmother      Accidental death Paternal Grandfather      Other (Other) Father's Sister          AMYOTROPHIC LATERAL SCLEROSIS    Hyperlipidemia Other MAT RELATIVES     Hyperlipidemia Other PAT RELATIVES        MEDICATION LIST:  Current  "Outpatient Medications   Medication Instructions    amitriptyline (ELAVIL) 10 mg, oral, Nightly    cholecalciferol (VITAMIN D3) 10,000 Units, oral, Weekly    mycophenolate (MYFORTIC) 540 mg, oral, 2 times daily    predniSONE (DELTASONE) 5 mg, oral, Daily    sodium,potassium,mag sulfates (Suprep) 17.5-3.13-1.6 gram recon soln solution Take one bottle beginning at 6pm night before procedure and then take the other bottle 5 hours before procedure time as directed per instruction sheet    vedolizumab (ENTYVIO) 300 mg, intravenous, Once       ALLERGY  Allergies   Allergen Reactions    Cephalexin Rash    Methotrexate Headache, Other and Unknown     \"Did not tolerate well\", per mom. \" He would get sick, it caused nausea and headaches.\"    Adhesive Tape-Silicones Rash     Redness, raw skin       PHYSICAL EXAM:    Visit Vitals  /74   Pulse 101   Temp 36.3 °C (97.4 °F) (Temporal)   Wt 48.1 kg (106 lb)   SpO2 97%   BMI 17.11 kg/m²   Smoking Status Never   BSA 1.5 m²          Vital signs - reviewed. Acceptable BP at this office visit.   General Appearance - NAD, Good speech, oriented and alert  HEENT - Supple. Not pale. No jaundice.   CVS - RRR. Normal S1/S2. No murmur, click , rub or gallop  Lungs- clear to auscultation bilaterally  Abdomen - soft , not tender, no guarding, no rigidity. No hepatosplenomegaly. Normal bowel sounds. No masses and ascites. S/P Kidney transplant .  Transplanted kidney is not tender.   Musculoskeletal /Extremities - no edema. Full ROM. No joint tenderness.   Neuro/Psych - appropriate mood and affect. Motor power V/V all extremities. CN I -XII were grossly intact.  Skin - No visible rash      LABS:    Lab Results   Component Value Date    WBC 4.4 12/09/2024    HGB 13.9 12/09/2024    HCT 44.1 12/09/2024     12/09/2024    ALT 17 04/16/2024    AST 17 04/16/2024     12/09/2024    K 5.2 12/09/2024     12/09/2024    CREATININE 1.16 12/09/2024    BUN 22 12/09/2024    CO2 28 " 12/09/2024    INR 1.1 04/16/2024    HGBA1C 4.2 04/16/2024       ASSESSMENT AND PLAN:    Mr. Ellison is a 19 y.o. male  who is here for follow up s/p kidney transplant.    TRANSPLANT DATE: 4/25/2024 (Kidney)      1. ESRD S/P kidney transplant   - Creatinine last check was :  Lab Results   Component Value Date    CREATININE 1.16 12/09/2024     - Renal allograft function is excellent.   -Allosure last check was normal  -Ensure adequate hydration  - Avoid nephrotoxic medications, NSAIDs, and IV contrast.    2. Immunosuppression  -Tacrolimus level last check was 7.9  -Continue current immunosuppression regimen.  TAC 3.5 mg BID --> switched to Envarsus 5 mg QAM   mg BID  Pred 5 mg once daily    3. Electrolytes  Lab Results   Component Value Date    GLUCOSE 73 (L) 12/09/2024    CALCIUM 10.1 12/09/2024     12/09/2024    K 5.2 12/09/2024    CO2 28 12/09/2024     12/09/2024    BUN 22 12/09/2024    CREATININE 1.16 12/09/2024     -Acceptable from last lab drawn    4. Hypertension  Blood Pressures         12/10/2024  1137             BP: 104/74        WNL      5. Bone Mineral Disease/Osteoporosis  Lab Results   Component Value Date    PTH 74.1 08/26/2024    CALCIUM 10.1 12/09/2024    PHOS 3.6 12/09/2024    VITD25 20 (L) 08/26/2024   Vitamin D  - May consider initiation of Sensipar when PTH >300 AND Ca >8.4    6.Anemia  Lab Results   Component Value Date    WBC 4.4 12/09/2024    HGB 13.9 12/09/2024    HCT 44.1 12/09/2024    MCV 89 12/09/2024     12/09/2024     Lab Results   Component Value Date    IRON 159 (H) 08/26/2024    TIBC 282 08/26/2024    FERRITIN 472 (H) 08/26/2024     -asymptomatic  - Continue to monitor  -check iron studies and ferritin. Will consider TUTU as needed.  - No indications for blood transfusion     7.Health maintenance and vaccination  - Flu shot during flu season annually  - Cancer screening is up to date per the patient    8.Allergic rhinitis - Flonase    Lab : Routine transplant  lab ( CBC, RFP, and anti-rejection trough level ) every 1 month  Additional labs:  VIT D, PTH with next lab  Viral screening PCR, Allosure and UPC per protocol.    Additional Plan :  Switch to Envarsus 5 mg QAM  Ok to undergo colonoscopy - prep per GI.  Ok to start taking protein supplement and weight lifting    RTC 2 month(s)    Jignesh Haddad MD    Transplant Nephrology

## 2024-12-10 NOTE — PATIENT INSTRUCTIONS
Envarsus 5 mg daily FELIPE Malcolm has been working on it per patient.  Currently on Tac 3.5 mg BID  Pt has upcoming colonoscopy on 12/19  Labs monthly  RTC in 2 months    Started Flonase

## 2024-12-10 NOTE — Clinical Note
Envarsus 5 mg daily FELIPE Malcolm has been working on it per patient. Currently on 3.5 mg BID Pt has upcoming colonoscopy on 12/19 Labs monthly RTC in 2 months  Started Flonase

## 2024-12-11 ENCOUNTER — DOCUMENTATION (OUTPATIENT)
Dept: TRANSPLANT | Facility: HOSPITAL | Age: 19
End: 2024-12-11
Payer: COMMERCIAL

## 2024-12-18 ENCOUNTER — TELEPHONE (OUTPATIENT)
Dept: TRANSPLANT | Facility: HOSPITAL | Age: 19
End: 2024-12-18

## 2024-12-18 DIAGNOSIS — Z94.0 KIDNEY REPLACED BY TRANSPLANT (HHS-HCC): ICD-10-CM

## 2024-12-19 ENCOUNTER — SPECIALTY PHARMACY (OUTPATIENT)
Dept: PHARMACY | Facility: CLINIC | Age: 19
End: 2024-12-19

## 2024-12-19 ENCOUNTER — TELEPHONE (OUTPATIENT)
Facility: HOSPITAL | Age: 19
End: 2024-12-19

## 2024-12-19 ENCOUNTER — APPOINTMENT (OUTPATIENT)
Dept: GASTROENTEROLOGY | Facility: EXTERNAL LOCATION | Age: 19
End: 2024-12-19
Payer: COMMERCIAL

## 2024-12-19 DIAGNOSIS — K50.80 CROHN'S DISEASE OF BOTH SMALL AND LARGE INTESTINE WITHOUT COMPLICATION (MULTI): ICD-10-CM

## 2024-12-19 DIAGNOSIS — D12.0 BENIGN NEOPLASM OF CECUM: ICD-10-CM

## 2024-12-19 DIAGNOSIS — K50.80 CROHN'S DISEASE OF BOTH SMALL AND LARGE INTESTINE WITHOUT COMPLICATION (MULTI): Primary | ICD-10-CM

## 2024-12-19 PROCEDURE — 45380 COLONOSCOPY AND BIOPSY: CPT | Performed by: INTERNAL MEDICINE

## 2024-12-19 PROCEDURE — 45385 COLONOSCOPY W/LESION REMOVAL: CPT | Performed by: INTERNAL MEDICINE

## 2024-12-19 PROCEDURE — 88342 IMHCHEM/IMCYTCHM 1ST ANTB: CPT | Performed by: PATHOLOGY

## 2024-12-19 PROCEDURE — 88305 TISSUE EXAM BY PATHOLOGIST: CPT

## 2024-12-19 PROCEDURE — 0753T DGTZ GLS MCRSCP SLD LEVEL IV: CPT

## 2024-12-19 PROCEDURE — 88305 TISSUE EXAM BY PATHOLOGIST: CPT | Performed by: PATHOLOGY

## 2024-12-19 PROCEDURE — 0760T DGTZ GLS MCRSCP SL IMM 1ST: CPT

## 2024-12-19 PROCEDURE — 88342 IMHCHEM/IMCYTCHM 1ST ANTB: CPT

## 2024-12-19 NOTE — TELEPHONE ENCOUNTER
Returned patients mothers call. Mom states pt already got test done. No further questions at this time

## 2024-12-19 NOTE — TELEPHONE ENCOUNTER
Patient called requesting to speak with coordinator about Patient mom called in, Kelvin taking prep for colonoscopy .   Patient call back number is [phone]

## 2024-12-19 NOTE — PROGRESS NOTES
Colonoscopy for history of Crohn's status post kidney transplant on no medication for disease assessment shows mild Crohn's colitis of the right colon with a small polyp.  Pending the pathology we will discuss biologic therapy due to his complicated Crohn's.

## 2024-12-19 NOTE — TELEPHONE ENCOUNTER
ON CALL:   Patient mom called in, Jack taking prep for colonoscopy tomorrow 12/19/24 at 11am.   Took Tac at 4 pm, threw up at 9 pm. No meds in vomit, concerned if patient cannot keep down 4 am dose. Advised her to see how he feels and if throws up to call us in morning and we can discuss timing of tac at that time.   Patient mom agreeable to this.   NFQ

## 2024-12-19 NOTE — TELEPHONE ENCOUNTER
Patient called requesting to speak with coordinator about medication dosaging after his colonoscopy . Patient has called a few times today please call her back today.  Patient call back number is [phone]

## 2024-12-20 ENCOUNTER — LAB REQUISITION (OUTPATIENT)
Dept: LAB | Facility: HOSPITAL | Age: 19
End: 2024-12-20
Payer: COMMERCIAL

## 2024-12-26 LAB
LAB AP ASR DISCLAIMER: NORMAL
LABORATORY COMMENT REPORT: NORMAL
PATH REPORT.FINAL DX SPEC: NORMAL
PATH REPORT.GROSS SPEC: NORMAL
PATH REPORT.MICROSCOPIC SPEC OTHER STN: NORMAL
PATH REPORT.RELEVANT HX SPEC: NORMAL
PATH REPORT.TOTAL CANCER: NORMAL

## 2024-12-27 ENCOUNTER — TELEPHONE (OUTPATIENT)
Dept: GASTROENTEROLOGY | Facility: CLINIC | Age: 19
End: 2024-12-27
Payer: COMMERCIAL

## 2024-12-27 DIAGNOSIS — K50.80 CROHN'S DISEASE OF BOTH SMALL AND LARGE INTESTINE WITHOUT COMPLICATION (MULTI): Primary | ICD-10-CM

## 2024-12-27 NOTE — TELEPHONE ENCOUNTER
Discussed with the mother as for consent of the son that Crohn's shows a small adenoma in Crohn's colitis.  Largely he is in remission.  He has no real significant symptoms.  I did do a search of tacrolimus and its usefulness and long-term of Crohn's which there is minimal data.  The data shows there is some case reports of treatment of pediatric patients with fistula healing to a small degree.  I have also consulted with other gastroenterologist at [facility] who say that in their experience some people on immunologic medicines for transplant do stay in remission.  At this point we are holding off on any medication for Crohn's disease in remission with close follow-up seeing him every 4 months with blood work and fecal calprotectin including symptoms to assess whether we restart Entyvio.  I am ordering fecal calprotectin and labs for baseline.  The mother and son will make an appointment with me in late March or early April.  All the questions are answered.  They are comfortable with this approach.  I will consider repeat exam in 2 to 3 years to assess endoscopic healing.

## 2025-01-02 ENCOUNTER — SPECIALTY PHARMACY (OUTPATIENT)
Dept: PHARMACY | Facility: CLINIC | Age: 20
End: 2025-01-02

## 2025-01-02 DIAGNOSIS — Z94.0 KIDNEY REPLACED BY TRANSPLANT (HHS-HCC): ICD-10-CM

## 2025-01-07 DIAGNOSIS — Z94.0 KIDNEY REPLACED BY TRANSPLANT (HHS-HCC): ICD-10-CM

## 2025-01-08 RX ORDER — TACROLIMUS 1 MG/1
TABLET, EXTENDED RELEASE ORAL
Qty: 30 TABLET | Refills: 11 | Status: SHIPPED | OUTPATIENT
Start: 2025-01-08 | End: 2025-01-09 | Stop reason: SDUPTHER

## 2025-01-09 DIAGNOSIS — Z94.0 KIDNEY REPLACED BY TRANSPLANT (HHS-HCC): ICD-10-CM

## 2025-01-10 ENCOUNTER — LAB (OUTPATIENT)
Dept: LAB | Facility: LAB | Age: 20
End: 2025-01-10
Payer: COMMERCIAL

## 2025-01-10 DIAGNOSIS — Z94.0 KIDNEY REPLACED BY TRANSPLANT (HHS-HCC): ICD-10-CM

## 2025-01-10 LAB
ALBUMIN SERPL BCP-MCNC: 4.6 G/DL (ref 3.4–5)
ANION GAP SERPL CALC-SCNC: 16 MMOL/L (ref 10–20)
BUN SERPL-MCNC: 33 MG/DL (ref 6–23)
CALCIUM SERPL-MCNC: 9.9 MG/DL (ref 8.6–10.6)
CHLORIDE SERPL-SCNC: 104 MMOL/L (ref 98–107)
CO2 SERPL-SCNC: 26 MMOL/L (ref 21–32)
CREAT SERPL-MCNC: 1.43 MG/DL (ref 0.5–1.3)
EGFRCR SERPLBLD CKD-EPI 2021: 72 ML/MIN/1.73M*2
ERYTHROCYTE [DISTWIDTH] IN BLOOD BY AUTOMATED COUNT: 13 % (ref 11.5–14.5)
GLUCOSE SERPL-MCNC: 92 MG/DL (ref 74–99)
HCT VFR BLD AUTO: 41.8 % (ref 41–52)
HGB BLD-MCNC: 12.9 G/DL (ref 13.5–17.5)
MAGNESIUM SERPL-MCNC: 2.14 MG/DL (ref 1.6–2.4)
MCH RBC QN AUTO: 27.2 PG (ref 26–34)
MCHC RBC AUTO-ENTMCNC: 30.9 G/DL (ref 32–36)
MCV RBC AUTO: 88 FL (ref 80–100)
NRBC BLD-RTO: 0 /100 WBCS (ref 0–0)
PHOSPHATE SERPL-MCNC: 3.8 MG/DL (ref 2.5–4.9)
PLATELET # BLD AUTO: 235 X10*3/UL (ref 150–450)
POTASSIUM SERPL-SCNC: 4.6 MMOL/L (ref 3.5–5.3)
RBC # BLD AUTO: 4.75 X10*6/UL (ref 4.5–5.9)
SODIUM SERPL-SCNC: 141 MMOL/L (ref 136–145)
WBC # BLD AUTO: 4.1 X10*3/UL (ref 4.4–11.3)

## 2025-01-10 PROCEDURE — 83735 ASSAY OF MAGNESIUM: CPT

## 2025-01-10 PROCEDURE — 85027 COMPLETE CBC AUTOMATED: CPT

## 2025-01-10 PROCEDURE — 80069 RENAL FUNCTION PANEL: CPT

## 2025-01-10 PROCEDURE — 80197 ASSAY OF TACROLIMUS: CPT

## 2025-01-11 LAB — TACROLIMUS BLD-MCNC: 9.2 NG/ML

## 2025-01-14 ENCOUNTER — DOCUMENTATION (OUTPATIENT)
Facility: HOSPITAL | Age: 20
End: 2025-01-14
Payer: COMMERCIAL

## 2025-01-14 NOTE — PROGRESS NOTES
Labs reviewed with Dr Dutton    cr 1.43 from 1.16  tac 9.2 from 7.9  3.5 BID- switching to envarsus 5 this week  last allosure 10/24 0.08    PLAN: hydrate and repeat labs. Messaged with plan

## 2025-01-23 ENCOUNTER — TELEPHONE (OUTPATIENT)
Facility: HOSPITAL | Age: 20
End: 2025-01-23
Payer: COMMERCIAL

## 2025-01-23 DIAGNOSIS — Z94.0 KIDNEY REPLACED BY TRANSPLANT (HHS-HCC): ICD-10-CM

## 2025-01-23 NOTE — TELEPHONE ENCOUNTER
Emailed PT's mother a copy of our dental clearance letter. Email on file was, [email or web address]. Cc'd Ally on the email

## 2025-01-24 ENCOUNTER — LAB (OUTPATIENT)
Dept: LAB | Facility: LAB | Age: 20
End: 2025-01-24
Payer: COMMERCIAL

## 2025-01-24 DIAGNOSIS — Z94.0 KIDNEY REPLACED BY TRANSPLANT (HHS-HCC): ICD-10-CM

## 2025-01-24 PROCEDURE — 80069 RENAL FUNCTION PANEL: CPT

## 2025-01-24 PROCEDURE — 85027 COMPLETE CBC AUTOMATED: CPT

## 2025-01-24 PROCEDURE — 83735 ASSAY OF MAGNESIUM: CPT

## 2025-01-24 PROCEDURE — 80197 ASSAY OF TACROLIMUS: CPT

## 2025-01-25 LAB
ALBUMIN SERPL BCP-MCNC: 4.9 G/DL (ref 3.4–5)
ANION GAP SERPL CALC-SCNC: 16 MMOL/L (ref 10–20)
BUN SERPL-MCNC: 33 MG/DL (ref 6–23)
CALCIUM SERPL-MCNC: 10.4 MG/DL (ref 8.6–10.6)
CHLORIDE SERPL-SCNC: 105 MMOL/L (ref 98–107)
CO2 SERPL-SCNC: 26 MMOL/L (ref 21–32)
CREAT SERPL-MCNC: 1.43 MG/DL (ref 0.5–1.3)
EGFRCR SERPLBLD CKD-EPI 2021: 71 ML/MIN/1.73M*2
ERYTHROCYTE [DISTWIDTH] IN BLOOD BY AUTOMATED COUNT: 12.6 % (ref 11.5–14.5)
GLUCOSE SERPL-MCNC: 104 MG/DL (ref 74–99)
HCT VFR BLD AUTO: 42.1 % (ref 41–52)
HGB BLD-MCNC: 12.8 G/DL (ref 13.5–17.5)
MAGNESIUM SERPL-MCNC: 2.11 MG/DL (ref 1.6–2.4)
MCH RBC QN AUTO: 27.2 PG (ref 26–34)
MCHC RBC AUTO-ENTMCNC: 30.4 G/DL (ref 32–36)
MCV RBC AUTO: 89 FL (ref 80–100)
NRBC BLD-RTO: 0 /100 WBCS (ref 0–0)
PHOSPHATE SERPL-MCNC: 4.4 MG/DL (ref 2.5–4.9)
PLATELET # BLD AUTO: 353 X10*3/UL (ref 150–450)
POTASSIUM SERPL-SCNC: 4.8 MMOL/L (ref 3.5–5.3)
RBC # BLD AUTO: 4.71 X10*6/UL (ref 4.5–5.9)
SODIUM SERPL-SCNC: 142 MMOL/L (ref 136–145)
TACROLIMUS BLD-MCNC: 9.8 NG/ML
WBC # BLD AUTO: 6.1 X10*3/UL (ref 4.4–11.3)

## 2025-01-27 DIAGNOSIS — Z94.0 KIDNEY REPLACED BY TRANSPLANT (HHS-HCC): ICD-10-CM

## 2025-01-29 ENCOUNTER — DOCUMENTATION (OUTPATIENT)
Facility: HOSPITAL | Age: 20
End: 2025-01-29
Payer: COMMERCIAL

## 2025-01-29 ENCOUNTER — TELEPHONE (OUTPATIENT)
Facility: HOSPITAL | Age: 20
End: 2025-01-29
Payer: COMMERCIAL

## 2025-01-29 LAB
ALLOSURE SCORE - KIDNEY: 0.08 %
CAREDX_ORDER_ID: NORMAL
CENTER_ORDER_ID: NORMAL
CLIENT SPECIMEN ID - ALLOSURE: NORMAL
DONOR RELATION - ALLOSURE: NORMAL
NOTES - ALLOSURE: NORMAL
RELATIVE CHANGE VALUE - KIDNEY: NORMAL
TEST COMMENTS - ALLOSURE: NORMAL
TIME POST TX - ALLOSURE: NORMAL
TRANSPLANTED ORGAN - ALLOSURE: NORMAL
TX DATE - ALLOSURE/ALLOMAP: NORMAL
WP_ORDER_ID: NORMAL

## 2025-01-29 NOTE — TELEPHONE ENCOUNTER
Pharmacy called requesting to speak with coordinator about patient enversus.  Patient call back number is [phone]

## 2025-01-30 NOTE — TELEPHONE ENCOUNTER
Returned pharmacy phone call. Spoke with Molly bryant is scheduled for delivery and there is no further med clarification.

## 2025-02-10 ENCOUNTER — APPOINTMENT (OUTPATIENT)
Dept: TRANSPLANT | Facility: HOSPITAL | Age: 20
End: 2025-02-10
Payer: COMMERCIAL

## 2025-02-10 ENCOUNTER — TELEPHONE (OUTPATIENT)
Facility: HOSPITAL | Age: 20
End: 2025-02-10
Payer: COMMERCIAL

## 2025-02-10 VITALS
HEART RATE: 98 BPM | OXYGEN SATURATION: 98 % | DIASTOLIC BLOOD PRESSURE: 86 MMHG | SYSTOLIC BLOOD PRESSURE: 120 MMHG | TEMPERATURE: 97.6 F | WEIGHT: 104.3 LBS | BODY MASS INDEX: 16.83 KG/M2

## 2025-02-10 DIAGNOSIS — Z94.0 KIDNEY REPLACED BY TRANSPLANT (HHS-HCC): ICD-10-CM

## 2025-02-10 DIAGNOSIS — R19.7 DIARRHEA, UNSPECIFIED TYPE: ICD-10-CM

## 2025-02-10 PROCEDURE — 99215 OFFICE O/P EST HI 40 MIN: CPT | Performed by: INTERNAL MEDICINE

## 2025-02-10 PROCEDURE — 99215 OFFICE O/P EST HI 40 MIN: CPT | Mod: AF | Performed by: INTERNAL MEDICINE

## 2025-02-10 ASSESSMENT — PAIN SCALES - GENERAL: PAINLEVEL_OUTOF10: 0-NO PAIN

## 2025-02-10 NOTE — PROGRESS NOTES
TRANSPLANT NEPHROLOGY :   OUTPATIENT CLINIC NOTE      SERVICE DATE : 02/10/2025     REASON FOR VISIT/CHIEF COMPLAINT:  S/P  TRANSPLANT SURGERY  IMMUNOSUPPRESSIVE MEDICATION MANAGEMENT  BLOOD PRESSURE MANAGEMENT    HPI:    Mr. Ellison is a 20 y.o. male with past medical history significant for congenital hydronephrosis and ESRD secondary to chronic Remicade use secondary to Crohn disease s/p living related kidney transplant done on 4/25/2024. PRA 0%, hepatitis C viral status D-/R- CMV D-/R-, hepatitis B virus D-/R-. Patient had a spontaneous graft function with no significant posttransplant complications.       Last seen 6 weeks ago. Here for follow up.     Switched to Envarsus approx a week ago due to persistent tremors. Trough level pending. Reports hand tremors are improving.     Recently seen by GI for follow up on Crohn's. No plan to restart Entyvio.     No new complaints. Denied chest pain, SOB, CH, Palpitation. Normal urination and bowel movement. Normal gait and no weakness of arms/legs. No cough, runny nose, sore throat, cold symptoms, or rash. No hearing loss. Normal vision.No problems with his sleep, mood and function. No recent infection, hospitalization, surgery or ER visits.    ROS:  Review of  14 systems was performed system by system. See HPI. Otherwise, the symptoms were negative.    PAST MEDICAL HISTORY:  Past Medical History:   Diagnosis Date    Acute Crohn's disease (Multi)     colitis    Aluminum bone disease     Anemia     Angina pectoris     Cachexia (Multi)     End stage chronic kidney disease (Multi)     Dialysis    Hemodialysis patient (CMS-[facility])     Hydronephrosis     1 yo    Nephritis     Tachycardia     Vitamin D deficiency         PAST SURGICAL HISTORY:  Past Surgical History:   Procedure Laterality Date    COLON SURGERY  2014    crohns    COLONOSCOPY      CT GUIDED CHEST TUBE PLACEMENT  06/28/2023    CT GUIDED CHEST TUBE PLACEMENT 6/28/2023 [facility]    CT GUIDED PERCUTANEOUS  BIOPSY KIDNEY  2023    CT GUIDED PERCUTANEOUS BIOPSY LUNG  06/28/2023    CT GUIDED PERCUTANEOUS BIOPSY LUNG 6/28/2023 [facility] CT    IR TUNNELED DIALYSIS CATHETER  2023    LUNG BIOPSY  2023    UPPER GASTROINTESTINAL ENDOSCOPY          SOCIAL HISTORY:  Social History     Socioeconomic History    Marital status: Single     Spouse name: Not on file    Number of children: Not on file    Years of education: Not on file    Highest education level: Not on file   Occupational History    Not on file   Tobacco Use    Smoking status: Never    Smokeless tobacco: Never   Vaping Use    Vaping status: Never Used   Substance and Sexual Activity    Alcohol use: Never    Drug use: Never    Sexual activity: Not Currently   Other Topics Concern    Not on file   Social History Narrative    Not on file     Social Drivers of Health     Financial Resource Strain: Not on file   Food Insecurity: No Food Insecurity (11/8/2024)    Hunger Vital Sign     Worried About Running Out of Food in the Last Year: Never true     Ran Out of Food in the Last Year: Never true   Transportation Needs: Not on file   Physical Activity: Not on file   Stress: Not on file   Social Connections: Not on file   Intimate Partner Violence: Not on file   Housing Stability: Not on file       FAMILY HISTORY:  Family History   Problem Relation Name Age of Onset    Graves' disease Mother      Hypertension Father      Kidney disease Brother      Heart disease Maternal Grandmother      Hypertension Maternal Grandfather      COPD Paternal Grandmother      Heart disease Paternal Grandmother      Accidental death Paternal Grandfather      Other (Other) Father's Sister          AMYOTROPHIC LATERAL SCLEROSIS    Hyperlipidemia Other MAT RELATIVES     Hyperlipidemia Other PAT RELATIVES        MEDICATION LIST:  Current Outpatient Medications   Medication Instructions    amitriptyline (ELAVIL) 10 mg, oral, Nightly    cholecalciferol (VITAMIN D3) 10,000 Units, oral, Weekly    fluticasone  "(Flonase) 50 mcg/actuation nasal spray 1 spray, Each Nostril, Daily, Shake gently. Before first use, prime pump. After use, clean tip and replace cap.    mycophenolate (MYFORTIC) 540 mg, oral, 2 times daily    predniSONE (DELTASONE) 5 mg, oral, Daily    tacrolimus ER (ENVARSUS XR) 4 mg, oral, Daily    tacrolimus ER (ENVARSUS XR) 1 mg, oral, Daily, In addition to 4 mg tabs for daily total 5 mg    vedolizumab (ENTYVIO) 300 mg, intravenous, Once       ALLERGY  Allergies   Allergen Reactions    Cephalexin Rash    Methotrexate Headache, Other and Unknown     \"Did not tolerate well\", per mom. \" He would get sick, it caused nausea and headaches.\"    Adhesive Tape-Silicones Rash     Redness, raw skin       PHYSICAL EXAM:    Visit Vitals  /86 (BP Location: Right arm, Patient Position: Sitting, BP Cuff Size: Adult)   Pulse 98   Temp 36.4 °C (97.6 °F) (Temporal)   Wt 47.3 kg (104 lb 4.8 oz)   SpO2 98%   BMI 16.83 kg/m²   Smoking Status Never   BSA 1.48 m²          General Appearance - NAD, A&Ox3   HEENT - Supple. Not pale. No jaundice. No JVD or LAD  CVS - RRR. Normal S1/S2. No murmur, rub or gallop  Lungs- clear to auscultation bilaterally  Abdomen - soft , NT, ND, no guarding. No hepatosplenomegaly. No allograft tenderness  Musculoskeletal /Extremities - no edema. Full ROM. No joint tenderness.   Neuro/Psych - appropriate mood and affect. Motor power V/V all extremities. CN I -XII were grossly intact.  Skin - No visible rash      LABS:    Lab Results   Component Value Date    CREATININE 1.43 (H) 01/24/2025    BUN 33 (H) 01/24/2025     01/24/2025    K 4.8 01/24/2025     01/24/2025    CO2 26 01/24/2025     Lab Results   Component Value Date    PTH 74.1 08/26/2024    CALCIUM 10.4 01/24/2025    PHOS 4.4 01/24/2025     Lab Results   Component Value Date    WBC 6.1 01/24/2025    HGB 12.8 (L) 01/24/2025    HCT 42.1 01/24/2025    MCV 89 01/24/2025     01/24/2025     Lab Results   Component Value Date    IRON " 159 (H) 08/26/2024    TIBC 282 08/26/2024    FERRITIN 472 (H) 08/26/2024     Lab Results   Component Value Date    TACROLIMUS 9.8 01/24/2025    EBVDNAPCR Not Detected 09/24/2024     Lab Results   Component Value Date    CMVDNAPCR Not Detected 08/26/2024    BKVDNAPCR Not Detected 08/26/2024    EBVDNAPCR Not Detected 09/24/2024         ASSESSMENT AND PLAN:    Mr. Ellison is a 20 y.o. male  who is here for follow up s/p kidney transplant.    TRANSPLANT DATE: 4/25/2024 (Kidney)      1. ESRD S/P kidney transplant   - Creatinine last check was 1.43. slightly elevated from baseline ~1.1. ?sec to supratherapeutic Fk vs other  - Renal allograft function is relatively stable.   -Random urine protein/creatinine ratio is 480 mg/g 5/2024. Due for updated urine studies  -Allosure last check was 0.08%   -Ensure adequate hydration  - Avoid nephrotoxic medications, NSAIDs, and IV contrast.    2. Immunosuppression  -Tacrolimus level last check was 9.8, abeo goal (5-8). recently switched to Envarsus. Monitor rpt level to titrate dose as indicated.  -Continue  mg q12, Pred 5 mg/d   - EBV, CMV, BK PCR neg    3. Electrolytes  -Acceptable from last lab drawn    4. Hypertension  - Office BP elevated today. Has not checked home Bps recently.   - No hypervolemia on exam  - advised pt to check home Bps regulalry and call with log in a week or so.   - currently not on any BP meds.      5. Bone Mineral Disease/Osteoporosis  - Ca, phos acceptable. PTH wnl 8/2024  - Consider DEXA every 2-3 years , defer to PCP    6.Anemia/Leukopenia:  - Hb 12.8, acceptable  - WBC stable    7.Health maintenance and vaccination  - Flu shot during flu season annually  - Cancer screening is up to date per the patient    Lab : Routine transplant lab ( CBC, RFP, and anti-rejection trough level ) every 1 week   Additional labs:  VIT D, PTH Q3 months  Viral screening PCR, Allosure and UPC per protocol.    RTC 6 weeks

## 2025-02-10 NOTE — PATIENT INSTRUCTIONS
Labs this week and weekly while we monitor your tacrolimus level- you informed us you take your Envarsus at 5 pm daily and will get labs drawn at 4 pm.     Monitor home BP and HR let us know if either elevated consistently.    Return to see us in 6 week     We will call you to make an appointment with Dr. Iliana Willams, Psychologist.

## 2025-02-10 NOTE — TELEPHONE ENCOUNTER
"Patient scheduled for nephrology as requested by coordinator.   Location, date, and instructions given to patient.   Advised patient that if any changes to appointment are needed, they must call the office so we are aware    Name: Earl Guzman \"Kelvin\" MRN: [id number]  Date: 2/20/2025 Status: [facility]  Time: 11:00 AM    Arrive By:   Arrival Location: Patient's Home  Length: 60    Visit Type: TXP PSYCH VISIT EPV [1708] Copay: $0.00  Provider: Iliana Willams, PhD Department: [address]      Department Address: [address] [place] [id number]  CSN: [id number]    Bill Area:     Referral Number:        Referring Provider:   Referral Status:    WHITNEY: EARL GUZMAN [[id number]]      Notes: f/u  Made On: 2/10/2025 1:42 PM By: VIRGEN BADILLO [[id number]] (ES)      "

## 2025-02-12 ENCOUNTER — LAB (OUTPATIENT)
Dept: LAB | Facility: HOSPITAL | Age: 20
End: 2025-02-12
Payer: COMMERCIAL

## 2025-02-12 PROCEDURE — 84156 ASSAY OF PROTEIN URINE: CPT

## 2025-02-12 PROCEDURE — 82306 VITAMIN D 25 HYDROXY: CPT

## 2025-02-12 PROCEDURE — 83735 ASSAY OF MAGNESIUM: CPT

## 2025-02-12 PROCEDURE — 83970 ASSAY OF PARATHORMONE: CPT

## 2025-02-12 PROCEDURE — 80069 RENAL FUNCTION PANEL: CPT

## 2025-02-12 PROCEDURE — 85027 COMPLETE CBC AUTOMATED: CPT

## 2025-02-12 PROCEDURE — 82570 ASSAY OF URINE CREATININE: CPT

## 2025-02-13 ENCOUNTER — LAB (OUTPATIENT)
Dept: LAB | Facility: HOSPITAL | Age: 20
End: 2025-02-13
Payer: COMMERCIAL

## 2025-02-13 DIAGNOSIS — Z94.0 KIDNEY REPLACED BY TRANSPLANT (HHS-HCC): ICD-10-CM

## 2025-02-13 DIAGNOSIS — Z94.0 KIDNEY TRANSPLANT STATUS: Primary | ICD-10-CM

## 2025-02-13 LAB
25(OH)D3 SERPL-MCNC: 139 NG/ML (ref 30–100)
ALBUMIN SERPL BCP-MCNC: 4.7 G/DL (ref 3.4–5)
ANION GAP SERPL CALC-SCNC: 14 MMOL/L (ref 10–20)
BKV DNA SERPL NAA+PROBE-LOG#: NORMAL {LOG_COPIES}/ML
BUN SERPL-MCNC: 21 MG/DL (ref 6–23)
CALCIUM SERPL-MCNC: 9.9 MG/DL (ref 8.6–10.6)
CHLORIDE SERPL-SCNC: 103 MMOL/L (ref 98–107)
CMV DNA SERPL NAA+PROBE-LOG IU: NORMAL {LOG_IU}/ML
CO2 SERPL-SCNC: 27 MMOL/L (ref 21–32)
CREAT SERPL-MCNC: 1.19 MG/DL (ref 0.5–1.3)
CREAT UR-MCNC: 69 MG/DL (ref 20–370)
EBV DNA SERPL NAA+PROBE-ACNC: 596 IU/ML
EBV DNA SPEC NAA+PROBE-LOG#: 2.78 LOG IU/ML
EGFRCR SERPLBLD CKD-EPI 2021: 89 ML/MIN/1.73M*2
ERYTHROCYTE [DISTWIDTH] IN BLOOD BY AUTOMATED COUNT: 13.2 % (ref 11.5–14.5)
GLUCOSE SERPL-MCNC: 108 MG/DL (ref 74–99)
HCT VFR BLD AUTO: 37.9 % (ref 41–52)
HGB BLD-MCNC: 11.3 G/DL (ref 13.5–17.5)
HOLD SPECIMEN: NORMAL
LABORATORY COMMENT REPORT: DETECTED
LABORATORY COMMENT REPORT: NOT DETECTED
LABORATORY COMMENT REPORT: NOT DETECTED
MAGNESIUM SERPL-MCNC: 1.88 MG/DL (ref 1.6–2.4)
MCH RBC QN AUTO: 27.4 PG (ref 26–34)
MCHC RBC AUTO-ENTMCNC: 29.8 G/DL (ref 32–36)
MCV RBC AUTO: 92 FL (ref 80–100)
NRBC BLD-RTO: 0 /100 WBCS (ref 0–0)
PHOSPHATE SERPL-MCNC: 3.8 MG/DL (ref 2.5–4.9)
PLATELET # BLD AUTO: 254 X10*3/UL (ref 150–450)
POTASSIUM SERPL-SCNC: 4.6 MMOL/L (ref 3.5–5.3)
PROT UR-ACNC: 15 MG/DL (ref 5–25)
PROT/CREAT UR: 0.22 MG/MG CREAT (ref 0–0.17)
PTH-INTACT SERPL-MCNC: 56.7 PG/ML (ref 18.5–88)
RBC # BLD AUTO: 4.13 X10*6/UL (ref 4.5–5.9)
SODIUM SERPL-SCNC: 139 MMOL/L (ref 136–145)
TACROLIMUS BLD-MCNC: 10.7 NG/ML
WBC # BLD AUTO: 5.5 X10*3/UL (ref 4.4–11.3)

## 2025-02-13 RX ORDER — MYCOPHENOLIC ACID 180 MG/1
360 TABLET, DELAYED RELEASE ORAL 2 TIMES DAILY
Qty: 120 TABLET | Refills: 11 | Status: SHIPPED | OUTPATIENT
Start: 2025-02-13 | End: 2026-02-13

## 2025-02-13 NOTE — TELEPHONE ENCOUNTER
Labs reviewed with Dr Dutton    ebv 596 from neg, prior non qt  cr 1.19  tac 10.7, 9.8, 9.2, 7.9  -  envarsus 5    PLAN: decrease envarsus to 4 mg daily and MMF to 360 BID and repeat labs next week    Called pt- unable to leave message. Message sent via [employer] [facility] to pt and mother

## 2025-02-14 DIAGNOSIS — Z94.0 KIDNEY REPLACED BY TRANSPLANT (HHS-HCC): ICD-10-CM

## 2025-02-14 LAB
HLA RESULTS: NORMAL
HLA-A+B+C AB NFR SER: NORMAL %
HLA-DP+DQ+DR AB NFR SER: NORMAL %

## 2025-02-19 DIAGNOSIS — Z94.0 KIDNEY REPLACED BY TRANSPLANT (HHS-HCC): ICD-10-CM

## 2025-02-20 ENCOUNTER — LAB (OUTPATIENT)
Dept: LAB | Facility: HOSPITAL | Age: 20
End: 2025-02-20
Payer: COMMERCIAL

## 2025-02-20 ENCOUNTER — TELEMEDICINE (OUTPATIENT)
Facility: HOSPITAL | Age: 20
End: 2025-02-20
Payer: COMMERCIAL

## 2025-02-20 DIAGNOSIS — Z94.0 KIDNEY REPLACED BY TRANSPLANT (HHS-HCC): ICD-10-CM

## 2025-02-20 DIAGNOSIS — Z94.0 KIDNEY REPLACED BY TRANSPLANT (HHS-HCC): Primary | ICD-10-CM

## 2025-02-20 DIAGNOSIS — F43.20 ADJUSTMENT DISORDER IN REMISSION: ICD-10-CM

## 2025-02-20 DIAGNOSIS — Z94.0 KIDNEY TRANSPLANT STATUS: Primary | ICD-10-CM

## 2025-02-20 LAB
25(OH)D3 SERPL-MCNC: 134 NG/ML (ref 30–100)
ALBUMIN SERPL BCP-MCNC: 4.6 G/DL (ref 3.4–5)
ANION GAP SERPL CALC-SCNC: 12 MMOL/L (ref 10–20)
BUN SERPL-MCNC: 20 MG/DL (ref 6–23)
CALCIUM SERPL-MCNC: 9.7 MG/DL (ref 8.6–10.6)
CHLORIDE SERPL-SCNC: 105 MMOL/L (ref 98–107)
CO2 SERPL-SCNC: 26 MMOL/L (ref 21–32)
CREAT SERPL-MCNC: 1.22 MG/DL (ref 0.5–1.3)
CREAT UR-MCNC: 64.2 MG/DL (ref 20–370)
EGFRCR SERPLBLD CKD-EPI 2021: 87 ML/MIN/1.73M*2
ERYTHROCYTE [DISTWIDTH] IN BLOOD BY AUTOMATED COUNT: 13 % (ref 11.5–14.5)
GLUCOSE SERPL-MCNC: 80 MG/DL (ref 74–99)
HCT VFR BLD AUTO: 37.1 % (ref 41–52)
HGB BLD-MCNC: 11.9 G/DL (ref 13.5–17.5)
MCH RBC QN AUTO: 28.3 PG (ref 26–34)
MCHC RBC AUTO-ENTMCNC: 32.1 G/DL (ref 32–36)
MCV RBC AUTO: 88 FL (ref 80–100)
NRBC BLD-RTO: 0 /100 WBCS (ref 0–0)
PHOSPHATE SERPL-MCNC: 4.2 MG/DL (ref 2.5–4.9)
PLATELET # BLD AUTO: 335 X10*3/UL (ref 150–450)
POTASSIUM SERPL-SCNC: 4.2 MMOL/L (ref 3.5–5.3)
PROT UR-ACNC: 7 MG/DL (ref 5–25)
PROT/CREAT UR: 0.11 MG/MG CREAT (ref 0–0.17)
PTH-INTACT SERPL-MCNC: 83.2 PG/ML (ref 18.5–88)
RBC # BLD AUTO: 4.21 X10*6/UL (ref 4.5–5.9)
SODIUM SERPL-SCNC: 139 MMOL/L (ref 136–145)
TACROLIMUS BLD-MCNC: 4.4 NG/ML
WBC # BLD AUTO: 6.4 X10*3/UL (ref 4.4–11.3)

## 2025-02-20 PROCEDURE — 84156 ASSAY OF PROTEIN URINE: CPT

## 2025-02-20 PROCEDURE — 90832 PSYTX W PT 30 MINUTES: CPT | Performed by: PSYCHOLOGIST

## 2025-02-20 PROCEDURE — 80197 ASSAY OF TACROLIMUS: CPT

## 2025-02-20 PROCEDURE — 82306 VITAMIN D 25 HYDROXY: CPT

## 2025-02-20 PROCEDURE — 36415 COLL VENOUS BLD VENIPUNCTURE: CPT

## 2025-02-20 PROCEDURE — 90832 PSYTX W PT 30 MINUTES: CPT | Mod: 95 | Performed by: PSYCHOLOGIST

## 2025-02-20 PROCEDURE — 80069 RENAL FUNCTION PANEL: CPT

## 2025-02-20 PROCEDURE — 87799 DETECT AGENT NOS DNA QUANT: CPT

## 2025-02-20 PROCEDURE — 85027 COMPLETE CBC AUTOMATED: CPT

## 2025-02-20 PROCEDURE — 82570 ASSAY OF URINE CREATININE: CPT

## 2025-02-20 PROCEDURE — 83970 ASSAY OF PARATHORMONE: CPT

## 2025-02-20 NOTE — PROGRESS NOTES
BEHAVIORAL HEALTH: TRANSPLANT PSYCHOLOGY FOLLOW-UP    Patient Name: Trever Ellison   :  2005   MRN:  [id number]   Diagnoses: kidney replaced by transplant; adjustment disorder  Date of service: 2025  Time in: 11:00 am  Time out: 11:20 am  Location: This telehealth visit is a real time audio/visual communication. During the scheduling process, this patient has verbally consented to the submission of Telehealth visits and the patient is aware of the risks, benefits, and possible coinsurance/copay costs. This visit is being conducted by real time video on [employer] platform. Pt participated from his home. I participated from my office at [address] [place] [address]. I confirmed patient identity at the beginning of the session.     Presenting information: Trever was seen for this follow-up meeting at the request of transplant team. He is a 20 y.o. male with past medical history significant for congenital hydronephrosis and ESRD secondary to chronic Remicade use secondary to Crohn disease s/p living related kidney transplant done on 2024. PRA 0%, hepatitis C viral status D-/R- CMV D-/R-, hepatitis B virus D-/R-. Patient had a spontaneous graft function with no significant posttransplant complications. It appears from chart review that he sometimes has been difficult to contact and has occasionally not followed through precisely with medication changes. He had previously been described by his mother as being frustrated and unhappy because of physical discomfort. She had asked the  for mental health resources.    Padmaja Warren was cooperative with this brief interview today but did not spontaneously volunteer much about his situation. He did respond to questions, usually with fairly succinct answers. He said he is doing well now following his living donor transplant. He denied any current problems taking his medications. He is looking forward to getting more active. He is in school and has an interest in  architecture.    He denied symptoms of clinical depression, anxiety, or any other mood/psychological disorders. He denied current or past suicidal ideation/intent. He feels his coping is adequate to any psychosocial stressors he encounters. He does not feel he needs mental health counseling at this time.    Objective  Mental Status Exam:  General Appearance: well groomed, appropriate eye contact  Attitude/Behavior: cooperative, somewhat difficult to engage (appeared a little uncomfortable with the situation)  Motor: no psychomotor agitation or retardation, no tremor or other abnormal movements  Speech: normal rate, volume, prosody  Gait/Station: not tested  Mood: euthymic   Affect: euthymic, full-range  Thought Process: goal-directed  Thought Associations: no loosening of associations  Thought Content: normal  Perception: no perceptual abnormalities noted  Sensorium: alert and oriented to person, place, time and situation  Insight: fair  Judgment: fair  Cognition: cognitively intact to conversational testing with respect to attention, orientation, fund of knowledge, recent and remote memory, and language    Assessment/Plan  Today, I employed a combination of the following therapeutic interventions:  supportive psychotherapy/active listening and validation    I will be happy to follow up with Mr. Ellison in the future as warranted. I suspect he did have an adjustment disorder early on but symptoms seem to be resolving. I made him aware that I would be available to meet with him again.    Iliana Willams, PhD  Clinical Psychologist, Transplant [place]  Clinical Professor, Department of Psychiatry  [facility]      Note to patient: The 21st Century Cares Act makes medical notes like these available to patients in the interest of transparency; however, be advised this is a medical document. It is intended for communication with other medical professionals, not as a form of communication  from the healthcare provider to the patient. It is written in medical language and may contain abbreviations or words that are unfamiliar. It may appear blunt or direct. Medical documents are intended to carry relevant information, facts as evident, and the clinical opinion of only the healthcare provider.

## 2025-02-21 ENCOUNTER — DOCUMENTATION (OUTPATIENT)
Facility: HOSPITAL | Age: 20
End: 2025-02-21
Payer: COMMERCIAL

## 2025-02-21 DIAGNOSIS — Z94.0 KIDNEY REPLACED BY TRANSPLANT (HHS-HCC): ICD-10-CM

## 2025-02-21 LAB
BKV DNA SERPL NAA+PROBE-LOG#: NORMAL {LOG_COPIES}/ML
CMV DNA SERPL NAA+PROBE-LOG IU: NORMAL {LOG_IU}/ML
EBV DNA SERPL NAA+PROBE-ACNC: 891 IU/ML
EBV DNA SPEC NAA+PROBE-LOG#: 2.95 LOG IU/ML
LABORATORY COMMENT REPORT: DETECTED
LABORATORY COMMENT REPORT: NOT DETECTED
LABORATORY COMMENT REPORT: NOT DETECTED

## 2025-02-21 NOTE — PROGRESS NOTES
Labs reviewed with Dr mcnamara    Tac 4.4, 10.7, 9.8, 9.2  Pt recently switched to envarsus  Pt supposed to be on 5 mg envarsus- pt only started 4 mg capsules  Called pt- pt confirmed he has 1 mg capsules on hand  - pt just decreased MMF this week     PLAN: increase to 5 mg daily and get labs next week- no changes to MMF since pt just decreased dose- see ebv next week  Lab orders in place    Pt agreeable to plan- mom also updated via [employer]

## 2025-02-27 ENCOUNTER — LAB (OUTPATIENT)
Dept: LAB | Facility: HOSPITAL | Age: 20
End: 2025-02-27
Payer: COMMERCIAL

## 2025-02-27 ENCOUNTER — TELEPHONE (OUTPATIENT)
Facility: HOSPITAL | Age: 20
End: 2025-02-27
Payer: COMMERCIAL

## 2025-02-27 DIAGNOSIS — Z94.0 KIDNEY REPLACED BY TRANSPLANT (HHS-HCC): ICD-10-CM

## 2025-02-27 DIAGNOSIS — Z94.0 KIDNEY TRANSPLANT STATUS: Primary | ICD-10-CM

## 2025-02-27 LAB
25(OH)D3 SERPL-MCNC: 117 NG/ML (ref 30–100)
ALBUMIN SERPL BCP-MCNC: 4.6 G/DL (ref 3.4–5)
ANION GAP SERPL CALC-SCNC: 14 MMOL/L (ref 10–20)
BUN SERPL-MCNC: 22 MG/DL (ref 6–23)
CALCIUM SERPL-MCNC: 9.8 MG/DL (ref 8.6–10.6)
CHLORIDE SERPL-SCNC: 104 MMOL/L (ref 98–107)
CO2 SERPL-SCNC: 26 MMOL/L (ref 21–32)
CREAT SERPL-MCNC: 1.41 MG/DL (ref 0.5–1.3)
CREAT UR-MCNC: 62.2 MG/DL (ref 20–370)
EGFRCR SERPLBLD CKD-EPI 2021: 73 ML/MIN/1.73M*2
ERYTHROCYTE [DISTWIDTH] IN BLOOD BY AUTOMATED COUNT: 13.3 % (ref 11.5–14.5)
GLUCOSE SERPL-MCNC: 111 MG/DL (ref 74–99)
HCT VFR BLD AUTO: 38.3 % (ref 41–52)
HGB BLD-MCNC: 11.9 G/DL (ref 13.5–17.5)
MAGNESIUM SERPL-MCNC: 1.8 MG/DL (ref 1.6–2.4)
MCH RBC QN AUTO: 27.5 PG (ref 26–34)
MCHC RBC AUTO-ENTMCNC: 31.1 G/DL (ref 32–36)
MCV RBC AUTO: 89 FL (ref 80–100)
NRBC BLD-RTO: 0 /100 WBCS (ref 0–0)
PHOSPHATE SERPL-MCNC: 4.1 MG/DL (ref 2.5–4.9)
PLATELET # BLD AUTO: 298 X10*3/UL (ref 150–450)
POTASSIUM SERPL-SCNC: 4.4 MMOL/L (ref 3.5–5.3)
PROT UR-ACNC: 45 MG/DL (ref 5–25)
PROT/CREAT UR: 0.72 MG/MG CREAT (ref 0–0.17)
PTH-INTACT SERPL-MCNC: 74.2 PG/ML (ref 18.5–88)
RBC # BLD AUTO: 4.33 X10*6/UL (ref 4.5–5.9)
SODIUM SERPL-SCNC: 140 MMOL/L (ref 136–145)
TACROLIMUS BLD-MCNC: 9.9 NG/ML
WBC # BLD AUTO: 5.3 X10*3/UL (ref 4.4–11.3)

## 2025-02-27 PROCEDURE — 87799 DETECT AGENT NOS DNA QUANT: CPT

## 2025-02-27 PROCEDURE — 82570 ASSAY OF URINE CREATININE: CPT

## 2025-02-27 PROCEDURE — 80197 ASSAY OF TACROLIMUS: CPT

## 2025-02-27 PROCEDURE — 83735 ASSAY OF MAGNESIUM: CPT

## 2025-02-27 PROCEDURE — 80069 RENAL FUNCTION PANEL: CPT

## 2025-02-27 PROCEDURE — 84156 ASSAY OF PROTEIN URINE: CPT

## 2025-02-27 PROCEDURE — 85027 COMPLETE CBC AUTOMATED: CPT

## 2025-02-27 PROCEDURE — 36415 COLL VENOUS BLD VENIPUNCTURE: CPT

## 2025-02-27 PROCEDURE — 82306 VITAMIN D 25 HYDROXY: CPT

## 2025-02-27 PROCEDURE — 83970 ASSAY OF PARATHORMONE: CPT

## 2025-02-27 NOTE — TELEPHONE ENCOUNTER
Patient's mother wanted update on Envarsus- states she needs a ID, Group and BIN number for Envarsus script with kailyn. Called [employer]- [employer] states pt was denied PAP due to insurance covering med. Called [employer]- they state pt has $2,000 charge due to hitting a deductible then the patient would be charged $186.16 monthly once that deductible is hit.   Signed pt up for $0 copay card  BIN: [id number]  PCN:2002  Group: [id number]  ID: [id number]    Called [employer] with info- co pay card was applied by [employer].   Messaged mom to let her know.

## 2025-02-28 DIAGNOSIS — Z94.0 KIDNEY REPLACED BY TRANSPLANT (HHS-HCC): ICD-10-CM

## 2025-02-28 LAB
BKV DNA SERPL NAA+PROBE-LOG#: NORMAL {LOG_COPIES}/ML
HOLD SPECIMEN: NORMAL
HOLD SPECIMEN: NORMAL
LABORATORY COMMENT REPORT: NOT DETECTED
PTH-INTACT SERPL-MCNC: 77.2 PG/ML (ref 18.5–88)

## 2025-03-01 LAB
CMV DNA SERPL NAA+PROBE-LOG IU: NORMAL {LOG_IU}/ML
EBV DNA SERPL NAA+PROBE-ACNC: 236 IU/ML
EBV DNA SPEC NAA+PROBE-LOG#: 2.37 LOG IU/ML
LABORATORY COMMENT REPORT: DETECTED
LABORATORY COMMENT REPORT: NOT DETECTED

## 2025-03-05 ENCOUNTER — TELEPHONE (OUTPATIENT)
Facility: HOSPITAL | Age: 20
End: 2025-03-05
Payer: COMMERCIAL

## 2025-03-06 ENCOUNTER — LAB (OUTPATIENT)
Dept: LAB | Facility: HOSPITAL | Age: 20
End: 2025-03-06
Payer: COMMERCIAL

## 2025-03-06 DIAGNOSIS — Z94.0 KIDNEY TRANSPLANT STATUS: Primary | ICD-10-CM

## 2025-03-06 LAB
CREAT UR-MCNC: 135.3 MG/DL (ref 20–370)
MAGNESIUM SERPL-MCNC: 1.77 MG/DL (ref 1.6–2.4)

## 2025-03-06 PROCEDURE — 83735 ASSAY OF MAGNESIUM: CPT

## 2025-03-06 PROCEDURE — 82570 ASSAY OF URINE CREATININE: CPT

## 2025-03-06 NOTE — TELEPHONE ENCOUNTER
TRANSPLANT COORDINATOR BRII NOTE    Date: 3/5/2025  Time: 8:15 PM    Reason for call:  Pt called at 1821 to report he realized he took his Envarsus this morning by mistake.  Typical routine is to take it at 5pm.  He took it as normal yesterday, but accidentally took this morning again with other medications.  Reviewed with Dr. Haddad - no changes, okay to continue normal routine tomorrow.  I notified pt to continue normal routine tomorrow, and he verbalized understanding.    Is patient admitted to outside Facility or ER? If yes include contact number and name of provider: NO    Triage :   N/A     Plan  Medical management: N/A  Current Immunosuppression: Adjustment: NO  New prescription needed: NO      Follow-Up   To follow up in: N/A    Task sent to  to schedule a follow up : N/A      Notified physicians on call :  Surgeon: N/A  Nephrologist: DR. FAROOQ HADDAD  Primary coordinator: YES  *Note sent to post transplant coordinator team YES

## 2025-03-07 ENCOUNTER — LAB REQUISITION (OUTPATIENT)
Dept: LAB | Facility: HOSPITAL | Age: 20
End: 2025-03-07
Payer: COMMERCIAL

## 2025-03-07 ENCOUNTER — TELEPHONE (OUTPATIENT)
Facility: HOSPITAL | Age: 20
End: 2025-03-07
Payer: COMMERCIAL

## 2025-03-07 ENCOUNTER — LAB (OUTPATIENT)
Dept: LAB | Facility: HOSPITAL | Age: 20
End: 2025-03-07
Payer: COMMERCIAL

## 2025-03-07 DIAGNOSIS — Z94.0 KIDNEY TRANSPLANT STATUS: ICD-10-CM

## 2025-03-07 DIAGNOSIS — Z94.0 KIDNEY REPLACED BY TRANSPLANT (HHS-HCC): ICD-10-CM

## 2025-03-07 DIAGNOSIS — Z94.0 KIDNEY TRANSPLANT STATUS: Primary | ICD-10-CM

## 2025-03-07 LAB
ALBUMIN SERPL BCP-MCNC: 4.7 G/DL (ref 3.4–5)
ANION GAP SERPL CALC-SCNC: 12 MMOL/L (ref 10–20)
BUN SERPL-MCNC: 20 MG/DL (ref 6–23)
CALCIUM SERPL-MCNC: 9.7 MG/DL (ref 8.6–10.3)
CHLORIDE SERPL-SCNC: 105 MMOL/L (ref 98–107)
CO2 SERPL-SCNC: 27 MMOL/L (ref 21–32)
CREAT SERPL-MCNC: 1.26 MG/DL (ref 0.5–1.3)
EGFRCR SERPLBLD CKD-EPI 2021: 83 ML/MIN/1.73M*2
ERYTHROCYTE [DISTWIDTH] IN BLOOD BY AUTOMATED COUNT: 13.5 % (ref 11.5–14.5)
GLUCOSE SERPL-MCNC: 93 MG/DL (ref 74–99)
HCT VFR BLD AUTO: 38.7 % (ref 41–52)
HGB BLD-MCNC: 12.3 G/DL (ref 13.5–17.5)
HOLD SPECIMEN: NORMAL
HOLD SPECIMEN: NORMAL
MCH RBC QN AUTO: 28.2 PG (ref 26–34)
MCHC RBC AUTO-ENTMCNC: 31.8 G/DL (ref 32–36)
MCV RBC AUTO: 89 FL (ref 80–100)
NRBC BLD-RTO: 0 /100 WBCS (ref 0–0)
PHOSPHATE SERPL-MCNC: 3.3 MG/DL (ref 2.5–4.9)
PLATELET # BLD AUTO: 262 X10*3/UL (ref 150–450)
POTASSIUM SERPL-SCNC: 4.2 MMOL/L (ref 3.5–5.3)
RBC # BLD AUTO: 4.36 X10*6/UL (ref 4.5–5.9)
SODIUM SERPL-SCNC: 140 MMOL/L (ref 136–145)
WBC # BLD AUTO: 6.1 X10*3/UL (ref 4.4–11.3)

## 2025-03-07 PROCEDURE — 80197 ASSAY OF TACROLIMUS: CPT

## 2025-03-07 PROCEDURE — 87799 DETECT AGENT NOS DNA QUANT: CPT

## 2025-03-07 PROCEDURE — 83970 ASSAY OF PARATHORMONE: CPT

## 2025-03-07 PROCEDURE — 85027 COMPLETE CBC AUTOMATED: CPT

## 2025-03-07 PROCEDURE — 80069 RENAL FUNCTION PANEL: CPT

## 2025-03-07 PROCEDURE — 82306 VITAMIN D 25 HYDROXY: CPT

## 2025-03-07 PROCEDURE — 36415 COLL VENOUS BLD VENIPUNCTURE: CPT

## 2025-03-07 NOTE — TELEPHONE ENCOUNTER
Patient called requesting to speak with coordinator about  how to go about ensuring lab draws are in  .  Patient call back number is [phone] .

## 2025-03-08 LAB
25(OH)D3 SERPL-MCNC: 107 NG/ML (ref 30–100)
PTH-INTACT SERPL-MCNC: 51.5 PG/ML (ref 18.5–88)
TACROLIMUS BLD-MCNC: 6.6 NG/ML

## 2025-03-09 LAB
BKV DNA SERPL NAA+PROBE-LOG#: NORMAL {LOG_COPIES}/ML
CMV DNA SERPL NAA+PROBE-LOG IU: NORMAL {LOG_IU}/ML
LABORATORY COMMENT REPORT: NOT DETECTED
LABORATORY COMMENT REPORT: NOT DETECTED

## 2025-03-13 ENCOUNTER — LAB (OUTPATIENT)
Dept: LAB | Facility: HOSPITAL | Age: 20
End: 2025-03-13
Payer: COMMERCIAL

## 2025-03-13 DIAGNOSIS — Z94.0 KIDNEY REPLACED BY TRANSPLANT (HHS-HCC): ICD-10-CM

## 2025-03-13 PROCEDURE — 80197 ASSAY OF TACROLIMUS: CPT

## 2025-03-13 PROCEDURE — 80069 RENAL FUNCTION PANEL: CPT

## 2025-03-13 PROCEDURE — 85027 COMPLETE CBC AUTOMATED: CPT

## 2025-03-13 PROCEDURE — 87799 DETECT AGENT NOS DNA QUANT: CPT

## 2025-03-13 PROCEDURE — 83735 ASSAY OF MAGNESIUM: CPT

## 2025-03-14 DIAGNOSIS — Z94.0 KIDNEY REPLACED BY TRANSPLANT (HHS-HCC): ICD-10-CM

## 2025-03-14 LAB
ALBUMIN SERPL BCP-MCNC: 4.7 G/DL (ref 3.4–5)
ANION GAP SERPL CALC-SCNC: 12 MMOL/L (ref 10–20)
BKV DNA SERPL NAA+PROBE-LOG#: NORMAL {LOG_COPIES}/ML
BUN SERPL-MCNC: 21 MG/DL (ref 6–23)
CALCIUM SERPL-MCNC: 9.7 MG/DL (ref 8.6–10.6)
CHLORIDE SERPL-SCNC: 105 MMOL/L (ref 98–107)
CO2 SERPL-SCNC: 25 MMOL/L (ref 21–32)
CREAT SERPL-MCNC: 1.29 MG/DL (ref 0.5–1.3)
CREAT UR-MCNC: 94.8 MG/DL (ref 20–370)
EBV DNA SPEC NAA+PROBE-LOG#: ABNORMAL {LOG_COPIES}/ML
EGFRCR SERPLBLD CKD-EPI 2021: 81 ML/MIN/1.73M*2
ERYTHROCYTE [DISTWIDTH] IN BLOOD BY AUTOMATED COUNT: 14 % (ref 11.5–14.5)
GLUCOSE SERPL-MCNC: 112 MG/DL (ref 74–99)
HCT VFR BLD AUTO: 42.4 % (ref 41–52)
HGB BLD-MCNC: 12.6 G/DL (ref 13.5–17.5)
LABORATORY COMMENT REPORT: ABNORMAL
LABORATORY COMMENT REPORT: NOT DETECTED
MAGNESIUM SERPL-MCNC: 1.8 MG/DL (ref 1.6–2.4)
MCH RBC QN AUTO: 27.9 PG (ref 26–34)
MCHC RBC AUTO-ENTMCNC: 29.7 G/DL (ref 32–36)
MCV RBC AUTO: 94 FL (ref 80–100)
NRBC BLD-RTO: 0 /100 WBCS (ref 0–0)
PHOSPHATE SERPL-MCNC: 4.1 MG/DL (ref 2.5–4.9)
PLATELET # BLD AUTO: 264 X10*3/UL (ref 150–450)
POTASSIUM SERPL-SCNC: 4.1 MMOL/L (ref 3.5–5.3)
PROT UR-ACNC: 7 MG/DL (ref 5–25)
PROT/CREAT UR: 0.07 MG/MG CREAT (ref 0–0.17)
RBC # BLD AUTO: 4.51 X10*6/UL (ref 4.5–5.9)
SODIUM SERPL-SCNC: 138 MMOL/L (ref 136–145)
TACROLIMUS BLD-MCNC: 15.1 NG/ML
WBC # BLD AUTO: 5.5 X10*3/UL (ref 4.4–11.3)

## 2025-03-17 ENCOUNTER — TELEPHONE (OUTPATIENT)
Facility: HOSPITAL | Age: 20
End: 2025-03-17
Payer: COMMERCIAL

## 2025-03-17 ENCOUNTER — PATIENT MESSAGE (OUTPATIENT)
Facility: HOSPITAL | Age: 20
End: 2025-03-17
Payer: COMMERCIAL

## 2025-03-17 NOTE — TELEPHONE ENCOUNTER
Tac 15.1 on labs  Pt on 5 mg envarsus    Called pt to discuss- pt did not take envarsus before labs. Pt confirmed he is on 5 mg. Pt states labs were about an our before he usually takes his meds.    Will review with MD and get back to patient this afternoon

## 2025-03-17 NOTE — TELEPHONE ENCOUNTER
Reviewed labs with Dr Pratt    Tac 15.1  On 5 envarsus daily    PLAN; hold 1 dose the restart envarsus at 4 mg daily- labs in 1 week  Attempted to call pt. Unable to leave vm    Messaged pt plan

## 2025-03-21 ENCOUNTER — LAB (OUTPATIENT)
Dept: LAB | Facility: HOSPITAL | Age: 20
End: 2025-03-21
Payer: COMMERCIAL

## 2025-03-21 DIAGNOSIS — Z94.0 KIDNEY TRANSPLANT STATUS: Primary | ICD-10-CM

## 2025-03-21 DIAGNOSIS — Z94.0 KIDNEY REPLACED BY TRANSPLANT (HHS-HCC): ICD-10-CM

## 2025-03-21 LAB
ALBUMIN SERPL BCP-MCNC: 4.8 G/DL (ref 3.4–5)
ANION GAP SERPL CALC-SCNC: 12 MMOL/L (ref 10–20)
BUN SERPL-MCNC: 20 MG/DL (ref 6–23)
CALCIUM SERPL-MCNC: 10 MG/DL (ref 8.6–10.6)
CHLORIDE SERPL-SCNC: 105 MMOL/L (ref 98–107)
CO2 SERPL-SCNC: 27 MMOL/L (ref 21–32)
CREAT SERPL-MCNC: 1.32 MG/DL (ref 0.5–1.3)
CREAT UR-MCNC: 134.7 MG/DL (ref 20–370)
EGFRCR SERPLBLD CKD-EPI 2021: 79 ML/MIN/1.73M*2
ERYTHROCYTE [DISTWIDTH] IN BLOOD BY AUTOMATED COUNT: 13.6 % (ref 11.5–14.5)
GLUCOSE SERPL-MCNC: 94 MG/DL (ref 74–99)
HCT VFR BLD AUTO: 42.6 % (ref 41–52)
HGB BLD-MCNC: 13.4 G/DL (ref 13.5–17.5)
HOLD SPECIMEN: NORMAL
MAGNESIUM SERPL-MCNC: 1.92 MG/DL (ref 1.6–2.4)
MCH RBC QN AUTO: 28.4 PG (ref 26–34)
MCHC RBC AUTO-ENTMCNC: 31.5 G/DL (ref 32–36)
MCV RBC AUTO: 90 FL (ref 80–100)
NRBC BLD-RTO: 0 /100 WBCS (ref 0–0)
PHOSPHATE SERPL-MCNC: 4.2 MG/DL (ref 2.5–4.9)
PLATELET # BLD AUTO: 291 X10*3/UL (ref 150–450)
POTASSIUM SERPL-SCNC: 4.4 MMOL/L (ref 3.5–5.3)
PROT UR-ACNC: 11 MG/DL (ref 5–25)
PROT/CREAT UR: 0.08 MG/MG CREAT (ref 0–0.17)
RBC # BLD AUTO: 4.72 X10*6/UL (ref 4.5–5.9)
SODIUM SERPL-SCNC: 140 MMOL/L (ref 136–145)
WBC # BLD AUTO: 6.4 X10*3/UL (ref 4.4–11.3)

## 2025-03-21 PROCEDURE — 82570 ASSAY OF URINE CREATININE: CPT

## 2025-03-21 PROCEDURE — 80069 RENAL FUNCTION PANEL: CPT

## 2025-03-21 PROCEDURE — 87799 DETECT AGENT NOS DNA QUANT: CPT

## 2025-03-21 PROCEDURE — 36415 COLL VENOUS BLD VENIPUNCTURE: CPT

## 2025-03-21 PROCEDURE — 84156 ASSAY OF PROTEIN URINE: CPT

## 2025-03-21 PROCEDURE — 83735 ASSAY OF MAGNESIUM: CPT

## 2025-03-21 PROCEDURE — 85027 COMPLETE CBC AUTOMATED: CPT

## 2025-03-21 PROCEDURE — 80197 ASSAY OF TACROLIMUS: CPT

## 2025-03-22 LAB — TACROLIMUS BLD-MCNC: 10 NG/ML

## 2025-03-24 ENCOUNTER — DOCUMENTATION (OUTPATIENT)
Facility: HOSPITAL | Age: 20
End: 2025-03-24
Payer: COMMERCIAL

## 2025-03-24 NOTE — PROGRESS NOTES
Labs reviewed with Dr Salas    tac 10.0, 15.1 (decreased envarsus)  envarsus 4  only 4 days inbetween med change and lab  pt on alec- he is getting rpt labs 1 week    OK to wait for rpt labs per dr salas

## 2025-03-28 DIAGNOSIS — Z94.0 KIDNEY REPLACED BY TRANSPLANT (HHS-HCC): ICD-10-CM

## 2025-03-31 ENCOUNTER — NURSE ONLY (OUTPATIENT)
Facility: HOSPITAL | Age: 20
End: 2025-03-31
Payer: COMMERCIAL

## 2025-03-31 ENCOUNTER — APPOINTMENT (OUTPATIENT)
Facility: HOSPITAL | Age: 20
End: 2025-03-31
Payer: COMMERCIAL

## 2025-03-31 VITALS
HEART RATE: 105 BPM | SYSTOLIC BLOOD PRESSURE: 117 MMHG | TEMPERATURE: 96.4 F | WEIGHT: 105.7 LBS | BODY MASS INDEX: 17.06 KG/M2 | DIASTOLIC BLOOD PRESSURE: 82 MMHG | OXYGEN SATURATION: 98 %

## 2025-03-31 DIAGNOSIS — Z48.298 AFTERCARE FOLLOWING ORGAN TRANSPLANT: ICD-10-CM

## 2025-03-31 DIAGNOSIS — Z94.0 KIDNEY REPLACED BY TRANSPLANT (HHS-HCC): ICD-10-CM

## 2025-03-31 DIAGNOSIS — I15.1 HYPERTENSION SECONDARY TO OTHER RENAL DISORDERS: ICD-10-CM

## 2025-03-31 DIAGNOSIS — Z79.899 ENCOUNTER FOR LONG-TERM (CURRENT) USE OF HIGH-RISK MEDICATION: ICD-10-CM

## 2025-03-31 DIAGNOSIS — B27.00 EBV (EPSTEIN-BARR VIRUS) VIREMIA: ICD-10-CM

## 2025-03-31 DIAGNOSIS — Z94.0 KIDNEY REPLACED BY TRANSPLANT (HHS-HCC): Primary | ICD-10-CM

## 2025-03-31 LAB
ALBUMIN SERPL BCP-MCNC: 4.6 G/DL (ref 3.4–5)
ANION GAP SERPL CALC-SCNC: 14 MMOL/L (ref 10–20)
BUN SERPL-MCNC: 17 MG/DL (ref 6–23)
CALCIUM SERPL-MCNC: 9.7 MG/DL (ref 8.6–10.6)
CHLORIDE SERPL-SCNC: 105 MMOL/L (ref 98–107)
CO2 SERPL-SCNC: 27 MMOL/L (ref 21–32)
CREAT SERPL-MCNC: 1.24 MG/DL (ref 0.5–1.3)
EGFRCR SERPLBLD CKD-EPI 2021: 85 ML/MIN/1.73M*2
ERYTHROCYTE [DISTWIDTH] IN BLOOD BY AUTOMATED COUNT: 13.8 % (ref 11.5–14.5)
GLUCOSE SERPL-MCNC: 84 MG/DL (ref 74–99)
HCT VFR BLD AUTO: 43.9 % (ref 41–52)
HGB BLD-MCNC: 13.4 G/DL (ref 13.5–17.5)
MAGNESIUM SERPL-MCNC: 2.05 MG/DL (ref 1.6–2.4)
MCH RBC QN AUTO: 28.3 PG (ref 26–34)
MCHC RBC AUTO-ENTMCNC: 30.5 G/DL (ref 32–36)
MCV RBC AUTO: 93 FL (ref 80–100)
NRBC BLD-RTO: 0 /100 WBCS (ref 0–0)
PHOSPHATE SERPL-MCNC: 3.2 MG/DL (ref 2.5–4.9)
PLATELET # BLD AUTO: 264 X10*3/UL (ref 150–450)
POTASSIUM SERPL-SCNC: 3.8 MMOL/L (ref 3.5–5.3)
RBC # BLD AUTO: 4.74 X10*6/UL (ref 4.5–5.9)
SODIUM SERPL-SCNC: 142 MMOL/L (ref 136–145)
TACROLIMUS BLD-MCNC: 6.4 NG/ML
WBC # BLD AUTO: 7.5 X10*3/UL (ref 4.4–11.3)

## 2025-03-31 PROCEDURE — 3079F DIAST BP 80-89 MM HG: CPT | Performed by: INTERNAL MEDICINE

## 2025-03-31 PROCEDURE — 87799 DETECT AGENT NOS DNA QUANT: CPT | Performed by: INTERNAL MEDICINE

## 2025-03-31 PROCEDURE — 85027 COMPLETE CBC AUTOMATED: CPT | Performed by: HOSPITALIST

## 2025-03-31 PROCEDURE — 3074F SYST BP LT 130 MM HG: CPT | Performed by: INTERNAL MEDICINE

## 2025-03-31 PROCEDURE — 80069 RENAL FUNCTION PANEL: CPT | Performed by: INTERNAL MEDICINE

## 2025-03-31 PROCEDURE — 83735 ASSAY OF MAGNESIUM: CPT | Performed by: INTERNAL MEDICINE

## 2025-03-31 PROCEDURE — 99215 OFFICE O/P EST HI 40 MIN: CPT | Mod: AF

## 2025-03-31 PROCEDURE — 99215 OFFICE O/P EST HI 40 MIN: CPT

## 2025-03-31 PROCEDURE — 80197 ASSAY OF TACROLIMUS: CPT | Performed by: HOSPITALIST

## 2025-03-31 ASSESSMENT — PAIN SCALES - GENERAL: PAINLEVEL_OUTOF10: 0-NO PAIN

## 2025-03-31 NOTE — PROGRESS NOTES
TRANSPLANT NEPHROLOGY :   OUTPATIENT CLINIC NOTE      SERVICE DATE : 03/31/2025     REASON FOR VISIT/CHIEF COMPLAINT:  S/P  TRANSPLANT SURGERY  IMMUNOSUPPRESSIVE MEDICATION MANAGEMENT  BLOOD PRESSURE MANAGEMENT    HPI:    Mr. Ellison is a 20 y.o. male with past medical history significant for congenital hydronephrosis and ESRD secondary to chronic Remicade use secondary to Crohn disease s/p living related kidney transplant done on 4/25/2024. PRA 0%, hepatitis C viral status D-/R- CMV D-/R-, hepatitis B virus D-/R-. Patient had a spontaneous graft function with no significant posttransplant complications.       Last seen 6 weeks ago. Here for follow up.      Hand tremors improved after switching to Envarsus. Tolerating current regimen well.     Patient is doing well overall. No new complaints. Denied chest pain, SOB, CH, Palpitation. Normal urination and bowel movement. Normal gait and no weakness of arms/legs. No cough, runny nose, sore throat, cold symptoms, or rash. No hearing loss. Normal vision.No problems with his sleep, mood and function. No recent infection, hospitalization, surgery or ER visits.      ROS:  Review of  14 systems was performed system by system. See HPI. Otherwise, the symptoms were negative.    PAST MEDICAL HISTORY:  Past Medical History:   Diagnosis Date    Acute Crohn's disease (Multi)     colitis    Aluminum bone disease     Anemia     Angina pectoris     Cachexia (Multi)     End stage chronic kidney disease (Multi)     Dialysis    Hemodialysis patient (CMS-[facility])     Hydronephrosis     1 yo    Nephritis     Tachycardia     Vitamin D deficiency         PAST SURGICAL HISTORY:  Past Surgical History:   Procedure Laterality Date    COLON SURGERY  2014    crohns    COLONOSCOPY      CT GUIDED CHEST TUBE PLACEMENT  06/28/2023    CT GUIDED CHEST TUBE PLACEMENT 6/28/2023 [facility]    CT GUIDED PERCUTANEOUS BIOPSY KIDNEY  2023    CT GUIDED PERCUTANEOUS BIOPSY LUNG  06/28/2023    CT GUIDED  PERCUTANEOUS BIOPSY LUNG 6/28/2023 [facility]    IR TUNNELED DIALYSIS CATHETER  2023    LUNG BIOPSY  2023    UPPER GASTROINTESTINAL ENDOSCOPY          SOCIAL HISTORY:  Social History     Socioeconomic History    Marital status: Single     Spouse name: Not on file    Number of children: Not on file    Years of education: Not on file    Highest education level: Not on file   Occupational History    Not on file   Tobacco Use    Smoking status: Never    Smokeless tobacco: Never   Vaping Use    Vaping status: Never Used   Substance and Sexual Activity    Alcohol use: Never    Drug use: Never    Sexual activity: Not Currently   Other Topics Concern    Not on file   Social History Narrative    Not on file     Social Drivers of Health     Financial Resource Strain: Not on file   Food Insecurity: No Food Insecurity (11/8/2024)    Hunger Vital Sign     Worried About Running Out of Food in the Last Year: Never true     Ran Out of Food in the Last Year: Never true   Transportation Needs: Not on file   Physical Activity: Not on file   Stress: Not on file   Social Connections: Not on file   Intimate Partner Violence: Not on file   Housing Stability: Not on file       FAMILY HISTORY:  Family History   Problem Relation Name Age of Onset    Graves' disease Mother      Hypertension Father      Kidney disease Brother      Heart disease Maternal Grandmother      Hypertension Maternal Grandfather      COPD Paternal Grandmother      Heart disease Paternal Grandmother      Accidental death Paternal Grandfather      Other (Other) Father's Sister          AMYOTROPHIC LATERAL SCLEROSIS    Hyperlipidemia Other MAT RELATIVES     Hyperlipidemia Other PAT RELATIVES        MEDICATION LIST:  Current Outpatient Medications   Medication Instructions    amitriptyline (ELAVIL) 10 mg, oral, Nightly    fluticasone (Flonase) 50 mcg/actuation nasal spray 1 spray, Each Nostril, Daily, Shake gently. Before first use, prime pump. After use, clean tip and replace  "cap.    mycophenolate (MYFORTIC) 360 mg, oral, 2 times daily    predniSONE (DELTASONE) 5 mg, oral, Daily    tacrolimus ER (ENVARSUS XR) 4 mg, oral, Daily    vedolizumab (ENTYVIO) 300 mg, Once       ALLERGY  Allergies   Allergen Reactions    Cephalexin Rash    Methotrexate Headache, Other and Unknown     \"Did not tolerate well\", per mom. \" He would get sick, it caused nausea and headaches.\"    Adhesive Tape-Silicones Rash     Redness, raw skin       PHYSICAL EXAM:    Visit Vitals  /82 (BP Location: Right arm, Patient Position: Sitting, BP Cuff Size: Adult)   Pulse 105   Temp 35.8 °C (96.4 °F) (Temporal)   Wt 47.9 kg (105 lb 11.2 oz)   SpO2 98%   BMI 17.06 kg/m²   Smoking Status Never   BSA 1.49 m²          General Appearance - NAD, A&Ox3   HEENT - Supple. Not pale. No jaundice. No JVD or LAD  CVS - RRR. Normal S1/S2. No murmur, rub or gallop  Lungs- clear to auscultation bilaterally  Abdomen - soft , NT, ND, no guarding. No hepatosplenomegaly. No allograft tenderness  Musculoskeletal /Extremities - no edema. Full ROM. No joint tenderness.   Neuro/Psych - appropriate mood and affect. Motor power V/V all extremities. CN I -XII were grossly intact.  Skin - No visible rash      LABS:    Lab Results   Component Value Date    CREATININE 1.32 (H) 03/21/2025    BUN 20 03/21/2025     03/21/2025    K 4.4 03/21/2025     03/21/2025    CO2 27 03/21/2025     Lab Results   Component Value Date    PTH 51.5 03/07/2025    CALCIUM 10.0 03/21/2025    PHOS 4.2 03/21/2025     Lab Results   Component Value Date    WBC 6.4 03/21/2025    HGB 13.4 (L) 03/21/2025    HCT 42.6 03/21/2025    MCV 90 03/21/2025     03/21/2025     Lab Results   Component Value Date    IRON 159 (H) 08/26/2024    TIBC 282 08/26/2024    FERRITIN 472 (H) 08/26/2024     Lab Results   Component Value Date    TACROLIMUS 10.0 03/21/2025    EBVDNAPCR Not Detected 03/21/2025     Lab Results   Component Value Date    CMVDNAPCR Not Detected 03/07/2025 "    BKVDNAPCR Not Detected 03/21/2025    EBVDNAPCR Not Detected 03/21/2025         ASSESSMENT AND PLAN:    Mr. Ellison is a 20 y.o. male  who is here for follow up s/p kidney transplant.    TRANSPLANT DATE: 4/25/2024 (Kidney)      1. ESRD S/P kidney transplant   - Creatinine last check was 1.32, relatively stable  -Random urine protein/creatinine ratio is 80 mg/g  -Allosure last check was 0.08% 2/2025. Due for rpt.  -Ensure adequate hydration  - Avoid nephrotoxic medications, NSAIDs, and IV contrast.    2. Immunosuppression  -Tacrolimus level last check was 10, slightly above goal (5-8). Follow rpt level.   -Continue  mg q12, Pred 5 mg/d   - EBV, CMV, BK PCR neg. H/o low level EBV viremia    3. Electrolytes  -Acceptable from last lab drawn    4. Hypertension  -recent BPs acceptable. No hypervolemia on exam  -cont to monitor home BP, call if trends concerning  -Continue current anti hypertensive medication    5. Bone Mineral Disease/Osteoporosis  - Ca, phos acceptable. PTH wnl, vit D 102- hold supplements.  - Consider DEXA every 2-3 years , defer to PCP    6.Anemia/leukopenia:  - Hb 13.4, acceptable  - WBC stable    7.Health maintenance and vaccination  - Flu shot during flu season annually  - Cancer screening is up to date per the patient    Lab : Routine transplant lab ( CBC, RFP, and anti-rejection trough level ) every 2 weeks  Additional labs:  VIT D, PTH Q3 months  Viral screening PCR, Allosure and UPC per protocol.    Additional Plan :  - Allosure with labs today    RTC 6 weeks

## 2025-04-01 LAB
EBV DNA SPEC NAA+PROBE-LOG#: NORMAL {LOG_COPIES}/ML
LABORATORY COMMENT REPORT: NOT DETECTED

## 2025-04-02 LAB
ALLOSURE SCORE - KIDNEY: 0.15 %
CAREDX_ORDER_ID: NORMAL
CENTER_ORDER_ID: NORMAL
CLIENT SPECIMEN ID - ALLOSURE: NORMAL
DONOR RELATION - ALLOSURE: NORMAL
NOTES - ALLOSURE: NORMAL
RELATIVE CHANGE VALUE - KIDNEY: NORMAL
TEST COMMENTS - ALLOSURE: NORMAL
TIME POST TX - ALLOSURE: NORMAL
TRANSPLANTED ORGAN - ALLOSURE: NORMAL
TX DATE - ALLOSURE/ALLOMAP: NORMAL
WP_ORDER_ID: NORMAL

## 2025-04-04 DIAGNOSIS — Z94.0 KIDNEY REPLACED BY TRANSPLANT (HHS-HCC): ICD-10-CM

## 2025-04-11 DIAGNOSIS — Z94.0 KIDNEY REPLACED BY TRANSPLANT (HHS-HCC): ICD-10-CM

## 2025-04-18 ENCOUNTER — LAB (OUTPATIENT)
Dept: LAB | Facility: HOSPITAL | Age: 20
End: 2025-04-18
Payer: COMMERCIAL

## 2025-04-18 DIAGNOSIS — Z94.0 KIDNEY TRANSPLANT STATUS: Primary | ICD-10-CM

## 2025-04-18 DIAGNOSIS — Z94.0 KIDNEY REPLACED BY TRANSPLANT (HHS-HCC): ICD-10-CM

## 2025-04-18 LAB
ALBUMIN SERPL BCP-MCNC: 4.8 G/DL (ref 3.4–5)
ANION GAP SERPL CALC-SCNC: 13 MMOL/L (ref 10–20)
BUN SERPL-MCNC: 19 MG/DL (ref 6–23)
CALCIUM SERPL-MCNC: 10.1 MG/DL (ref 8.6–10.6)
CHLORIDE SERPL-SCNC: 102 MMOL/L (ref 98–107)
CO2 SERPL-SCNC: 28 MMOL/L (ref 21–32)
CREAT SERPL-MCNC: 1.13 MG/DL (ref 0.5–1.3)
EBV DNA SPEC NAA+PROBE-LOG#: NORMAL {LOG_COPIES}/ML
EGFRCR SERPLBLD CKD-EPI 2021: >90 ML/MIN/1.73M*2
ERYTHROCYTE [DISTWIDTH] IN BLOOD BY AUTOMATED COUNT: 13.2 % (ref 11.5–14.5)
GLUCOSE SERPL-MCNC: 81 MG/DL (ref 74–99)
HCT VFR BLD AUTO: 47.4 % (ref 41–52)
HGB BLD-MCNC: 14.2 G/DL (ref 13.5–17.5)
HOLD SPECIMEN: NORMAL
LABORATORY COMMENT REPORT: NOT DETECTED
MAGNESIUM SERPL-MCNC: 1.96 MG/DL (ref 1.6–2.4)
MCH RBC QN AUTO: 27.6 PG (ref 26–34)
MCHC RBC AUTO-ENTMCNC: 30 G/DL (ref 32–36)
MCV RBC AUTO: 92 FL (ref 80–100)
NRBC BLD-RTO: 0 /100 WBCS (ref 0–0)
PHOSPHATE SERPL-MCNC: 4.3 MG/DL (ref 2.5–4.9)
PLATELET # BLD AUTO: 276 X10*3/UL (ref 150–450)
POTASSIUM SERPL-SCNC: 4.3 MMOL/L (ref 3.5–5.3)
RBC # BLD AUTO: 5.15 X10*6/UL (ref 4.5–5.9)
SODIUM SERPL-SCNC: 139 MMOL/L (ref 136–145)
TACROLIMUS BLD-MCNC: 8.9 NG/ML
WBC # BLD AUTO: 7.7 X10*3/UL (ref 4.4–11.3)

## 2025-04-18 PROCEDURE — 80069 RENAL FUNCTION PANEL: CPT

## 2025-04-18 PROCEDURE — 83735 ASSAY OF MAGNESIUM: CPT

## 2025-04-18 PROCEDURE — 85027 COMPLETE CBC AUTOMATED: CPT

## 2025-04-25 DIAGNOSIS — Z94.0 KIDNEY REPLACED BY TRANSPLANT (HHS-HCC): ICD-10-CM

## 2025-04-29 ENCOUNTER — LAB (OUTPATIENT)
Dept: LAB | Facility: HOSPITAL | Age: 20
End: 2025-04-29
Payer: COMMERCIAL

## 2025-04-29 DIAGNOSIS — Z94.0 KIDNEY TRANSPLANT STATUS: Primary | ICD-10-CM

## 2025-04-29 DIAGNOSIS — Z94.0 KIDNEY REPLACED BY TRANSPLANT (HHS-HCC): ICD-10-CM

## 2025-04-29 LAB
ALBUMIN SERPL BCP-MCNC: 4.9 G/DL (ref 3.4–5)
ANION GAP SERPL CALC-SCNC: 14 MMOL/L (ref 10–20)
BUN SERPL-MCNC: 23 MG/DL (ref 6–23)
CALCIUM SERPL-MCNC: 9.9 MG/DL (ref 8.6–10.6)
CHLORIDE SERPL-SCNC: 104 MMOL/L (ref 98–107)
CO2 SERPL-SCNC: 26 MMOL/L (ref 21–32)
CREAT SERPL-MCNC: 1.25 MG/DL (ref 0.5–1.3)
EGFRCR SERPLBLD CKD-EPI 2021: 84 ML/MIN/1.73M*2
ERYTHROCYTE [DISTWIDTH] IN BLOOD BY AUTOMATED COUNT: 12.9 % (ref 11.5–14.5)
GLUCOSE SERPL-MCNC: 103 MG/DL (ref 74–99)
HCT VFR BLD AUTO: 48 % (ref 41–52)
HGB BLD-MCNC: 14.9 G/DL (ref 13.5–17.5)
MAGNESIUM SERPL-MCNC: 1.85 MG/DL (ref 1.6–2.4)
MCH RBC QN AUTO: 28.4 PG (ref 26–34)
MCHC RBC AUTO-ENTMCNC: 31 G/DL (ref 32–36)
MCV RBC AUTO: 92 FL (ref 80–100)
NRBC BLD-RTO: 0 /100 WBCS (ref 0–0)
PHOSPHATE SERPL-MCNC: 3.3 MG/DL (ref 2.5–4.9)
PLATELET # BLD AUTO: 276 X10*3/UL (ref 150–450)
POTASSIUM SERPL-SCNC: 4 MMOL/L (ref 3.5–5.3)
RBC # BLD AUTO: 5.24 X10*6/UL (ref 4.5–5.9)
SODIUM SERPL-SCNC: 140 MMOL/L (ref 136–145)
WBC # BLD AUTO: 7.4 X10*3/UL (ref 4.4–11.3)

## 2025-04-29 PROCEDURE — 85027 COMPLETE CBC AUTOMATED: CPT

## 2025-04-30 LAB
EBV DNA SPEC NAA+PROBE-LOG#: NORMAL {LOG_COPIES}/ML
LABORATORY COMMENT REPORT: NOT DETECTED
TACROLIMUS BLD-MCNC: 5.1 NG/ML

## 2025-05-02 DIAGNOSIS — Z94.0 KIDNEY REPLACED BY TRANSPLANT (HHS-HCC): ICD-10-CM

## 2025-05-02 LAB
ALLOSURE SCORE - KIDNEY: 0.34 %
CAREDX_ORDER_ID: NORMAL
CENTER_ORDER_ID: NORMAL
CLIENT SPECIMEN ID - ALLOSURE: NORMAL
DONOR RELATION - ALLOSURE: NORMAL
NOTES - ALLOSURE: NORMAL
RELATIVE CHANGE VALUE - KIDNEY: NORMAL
TEST COMMENTS - ALLOSURE: NORMAL
TIME POST TX - ALLOSURE: NORMAL
TRANSPLANTED ORGAN - ALLOSURE: NORMAL
TX DATE - ALLOSURE/ALLOMAP: NORMAL
WP_ORDER_ID: NORMAL

## 2025-05-06 ENCOUNTER — TELEPHONE (OUTPATIENT)
Facility: HOSPITAL | Age: 20
End: 2025-05-06
Payer: COMMERCIAL

## 2025-05-06 DIAGNOSIS — R19.7 DIARRHEA, UNSPECIFIED TYPE: ICD-10-CM

## 2025-05-06 DIAGNOSIS — Z94.0 KIDNEY REPLACED BY TRANSPLANT (HHS-HCC): ICD-10-CM

## 2025-05-06 NOTE — TELEPHONE ENCOUNTER
Reviewed labs with Dr. Dutton:   Allosure 0.34 from 0.15 from 0.08  Cr 1.2, 1.13, 1.24   Virals neg  Tac 5.1    BID   Envarsus 6 mg daily   Pred 5 mg   No biopsy done yet     PLAN:   See if patient can increase to 540 mg BID Myfortic   Lab in 2 weeks with allosure and coag screen   Orders placed  Email sent to [facility] asking for kit delivery     Called patient and mom with update verbalized understanding of plan.

## 2025-05-07 RX ORDER — MYCOPHENOLIC ACID 180 MG/1
540 TABLET, DELAYED RELEASE ORAL 2 TIMES DAILY
Qty: 180 TABLET | Refills: 11 | Status: SHIPPED | OUTPATIENT
Start: 2025-05-07 | End: 2026-05-07

## 2025-05-08 ENCOUNTER — TELEPHONE (OUTPATIENT)
Facility: HOSPITAL | Age: 20
End: 2025-05-08
Payer: COMMERCIAL

## 2025-05-09 DIAGNOSIS — Z94.0 KIDNEY REPLACED BY TRANSPLANT (HHS-HCC): ICD-10-CM

## 2025-05-13 PROCEDURE — 36415 COLL VENOUS BLD VENIPUNCTURE: CPT

## 2025-05-13 PROCEDURE — 87799 DETECT AGENT NOS DNA QUANT: CPT

## 2025-05-13 PROCEDURE — 85027 COMPLETE CBC AUTOMATED: CPT

## 2025-05-13 PROCEDURE — 83735 ASSAY OF MAGNESIUM: CPT

## 2025-05-13 PROCEDURE — 80069 RENAL FUNCTION PANEL: CPT

## 2025-05-13 PROCEDURE — 80197 ASSAY OF TACROLIMUS: CPT

## 2025-05-13 PROCEDURE — 85610 PROTHROMBIN TIME: CPT

## 2025-05-13 PROCEDURE — 85730 THROMBOPLASTIN TIME PARTIAL: CPT

## 2025-05-13 NOTE — TELEPHONE ENCOUNTER
"Shantell Ellison \"Ginger\" called requesting to speak with coordinator about where the PT's lab orders have been sent to..  Sahntell Ellison \"Ginger\" call back number is [phone] .   "

## 2025-05-14 ENCOUNTER — TELEPHONE (OUTPATIENT)
Facility: HOSPITAL | Age: 20
End: 2025-05-14

## 2025-05-14 ENCOUNTER — DOCUMENTATION (OUTPATIENT)
Facility: HOSPITAL | Age: 20
End: 2025-05-14
Payer: COMMERCIAL

## 2025-05-14 ENCOUNTER — LAB (OUTPATIENT)
Dept: LAB | Facility: HOSPITAL | Age: 20
End: 2025-05-14
Payer: COMMERCIAL

## 2025-05-14 DIAGNOSIS — Z94.0 KIDNEY TRANSPLANT STATUS: Primary | ICD-10-CM

## 2025-05-14 DIAGNOSIS — Z94.0 KIDNEY REPLACED BY TRANSPLANT (HHS-HCC): ICD-10-CM

## 2025-05-14 LAB
ALBUMIN SERPL BCP-MCNC: 5.1 G/DL (ref 3.4–5)
ANION GAP SERPL CALC-SCNC: 12 MMOL/L (ref 10–20)
APTT PPP: 32 SECONDS (ref 26–36)
BUN SERPL-MCNC: 20 MG/DL (ref 6–23)
CALCIUM SERPL-MCNC: 10 MG/DL (ref 8.6–10.6)
CHLORIDE SERPL-SCNC: 104 MMOL/L (ref 98–107)
CO2 SERPL-SCNC: 26 MMOL/L (ref 21–32)
CREAT SERPL-MCNC: 1.21 MG/DL (ref 0.5–1.3)
EBV DNA SPEC NAA+PROBE-LOG#: NORMAL {LOG_COPIES}/ML
EGFRCR SERPLBLD CKD-EPI 2021: 87 ML/MIN/1.73M*2
ERYTHROCYTE [DISTWIDTH] IN BLOOD BY AUTOMATED COUNT: 12.9 % (ref 11.5–14.5)
GLUCOSE SERPL-MCNC: 86 MG/DL (ref 74–99)
HCT VFR BLD AUTO: 49.3 % (ref 41–52)
HGB BLD-MCNC: 15.2 G/DL (ref 13.5–17.5)
HOLD SPECIMEN: NORMAL
HOLD SPECIMEN: NORMAL
INR PPP: 1.1 (ref 0.9–1.1)
LABORATORY COMMENT REPORT: NOT DETECTED
MAGNESIUM SERPL-MCNC: 2.08 MG/DL (ref 1.6–2.4)
MCH RBC QN AUTO: 27.5 PG (ref 26–34)
MCHC RBC AUTO-ENTMCNC: 30.8 G/DL (ref 32–36)
MCV RBC AUTO: 89 FL (ref 80–100)
NRBC BLD-RTO: 0 /100 WBCS (ref 0–0)
PHOSPHATE SERPL-MCNC: 3 MG/DL (ref 2.5–4.9)
PLATELET # BLD AUTO: 258 X10*3/UL (ref 150–450)
POTASSIUM SERPL-SCNC: 4.3 MMOL/L (ref 3.5–5.3)
PROTHROMBIN TIME: 11.9 SECONDS (ref 9.8–12.4)
RBC # BLD AUTO: 5.53 X10*6/UL (ref 4.5–5.9)
SODIUM SERPL-SCNC: 138 MMOL/L (ref 136–145)
TACROLIMUS BLD-MCNC: 4.7 NG/ML
WBC # BLD AUTO: 4.9 X10*3/UL (ref 4.4–11.3)

## 2025-05-14 NOTE — PROGRESS NOTES
Labs reviewed with dr mcnamara    tac 4.7, 5.1, 8.9  goal 5-8  envarsus 4     PLAN: increase envarsus 5 mg     Messaged pt with plan

## 2025-05-16 DIAGNOSIS — Z94.0 KIDNEY REPLACED BY TRANSPLANT (HHS-HCC): ICD-10-CM

## 2025-05-19 LAB
ALLOSURE SCORE - KIDNEY: 0.11 %
CAREDX_ORDER_ID: NORMAL
CENTER_ORDER_ID: NORMAL
CLIENT SPECIMEN ID - ALLOSURE: NORMAL
DONOR RELATION - ALLOSURE: NORMAL
NOTES - ALLOSURE: NORMAL
RELATIVE CHANGE VALUE - KIDNEY: NORMAL
TEST COMMENTS - ALLOSURE: NORMAL
TIME POST TX - ALLOSURE: NORMAL
TRANSPLANTED ORGAN - ALLOSURE: NORMAL
TX DATE - ALLOSURE/ALLOMAP: NORMAL
WP_ORDER_ID: NORMAL

## 2025-05-23 DIAGNOSIS — Z94.0 KIDNEY REPLACED BY TRANSPLANT (HHS-HCC): ICD-10-CM

## 2025-05-30 DIAGNOSIS — Z94.0 KIDNEY REPLACED BY TRANSPLANT (HHS-HCC): ICD-10-CM

## 2025-06-03 ENCOUNTER — LAB (OUTPATIENT)
Dept: LAB | Facility: HOSPITAL | Age: 20
End: 2025-06-03
Payer: COMMERCIAL

## 2025-06-03 DIAGNOSIS — Z94.0 KIDNEY REPLACED BY TRANSPLANT (HHS-HCC): ICD-10-CM

## 2025-06-03 LAB
ALBUMIN SERPL BCP-MCNC: 4.7 G/DL (ref 3.4–5)
ANION GAP SERPL CALC-SCNC: 15 MMOL/L (ref 10–20)
BUN SERPL-MCNC: 25 MG/DL (ref 6–23)
CALCIUM SERPL-MCNC: 9.6 MG/DL (ref 8.6–10.3)
CHLORIDE SERPL-SCNC: 104 MMOL/L (ref 98–107)
CO2 SERPL-SCNC: 25 MMOL/L (ref 21–32)
CREAT SERPL-MCNC: 1.4 MG/DL (ref 0.5–1.3)
CREAT UR-MCNC: 129.6 MG/DL (ref 20–370)
EGFRCR SERPLBLD CKD-EPI 2021: 73 ML/MIN/1.73M*2
ERYTHROCYTE [DISTWIDTH] IN BLOOD BY AUTOMATED COUNT: 12.8 % (ref 11.5–14.5)
GLUCOSE SERPL-MCNC: 103 MG/DL (ref 74–99)
HCT VFR BLD AUTO: 46.1 % (ref 41–52)
HGB BLD-MCNC: 15 G/DL (ref 13.5–17.5)
MAGNESIUM SERPL-MCNC: 1.68 MG/DL (ref 1.6–2.4)
MCH RBC QN AUTO: 28.5 PG (ref 26–34)
MCHC RBC AUTO-ENTMCNC: 32.5 G/DL (ref 32–36)
MCV RBC AUTO: 88 FL (ref 80–100)
NRBC BLD-RTO: 0 /100 WBCS (ref 0–0)
PHOSPHATE SERPL-MCNC: 3.8 MG/DL (ref 2.5–4.9)
PLATELET # BLD AUTO: 253 X10*3/UL (ref 150–450)
POTASSIUM SERPL-SCNC: 4.4 MMOL/L (ref 3.5–5.3)
RBC # BLD AUTO: 5.27 X10*6/UL (ref 4.5–5.9)
SODIUM SERPL-SCNC: 140 MMOL/L (ref 136–145)
WBC # BLD AUTO: 4.1 X10*3/UL (ref 4.4–11.3)

## 2025-06-03 PROCEDURE — 85027 COMPLETE CBC AUTOMATED: CPT

## 2025-06-03 PROCEDURE — 87799 DETECT AGENT NOS DNA QUANT: CPT

## 2025-06-03 PROCEDURE — 83735 ASSAY OF MAGNESIUM: CPT

## 2025-06-03 PROCEDURE — 80197 ASSAY OF TACROLIMUS: CPT

## 2025-06-03 PROCEDURE — 82570 ASSAY OF URINE CREATININE: CPT

## 2025-06-03 PROCEDURE — 80069 RENAL FUNCTION PANEL: CPT

## 2025-06-03 RX ORDER — PREDNISONE 5 MG/1
5 TABLET ORAL DAILY
Qty: 30 TABLET | Refills: 11 | Status: SHIPPED | OUTPATIENT
Start: 2025-06-03

## 2025-06-04 LAB — TACROLIMUS BLD-MCNC: 11.2 NG/ML

## 2025-06-05 LAB
EBV DNA SPEC NAA+PROBE-LOG#: NORMAL {LOG_COPIES}/ML
LABORATORY COMMENT REPORT: NOT DETECTED

## 2025-06-06 ENCOUNTER — OFFICE VISIT (OUTPATIENT)
Facility: HOSPITAL | Age: 20
End: 2025-06-06
Payer: COMMERCIAL

## 2025-06-06 VITALS
OXYGEN SATURATION: 97 % | BODY MASS INDEX: 17.79 KG/M2 | TEMPERATURE: 98 F | DIASTOLIC BLOOD PRESSURE: 82 MMHG | WEIGHT: 110.2 LBS | SYSTOLIC BLOOD PRESSURE: 130 MMHG | HEART RATE: 99 BPM

## 2025-06-06 DIAGNOSIS — Z79.899 ENCOUNTER FOR LONG-TERM (CURRENT) USE OF HIGH-RISK MEDICATION: ICD-10-CM

## 2025-06-06 DIAGNOSIS — Z94.0 KIDNEY REPLACED BY TRANSPLANT (HHS-HCC): ICD-10-CM

## 2025-06-06 DIAGNOSIS — D84.9 IMMUNOSUPPRESSION: ICD-10-CM

## 2025-06-06 DIAGNOSIS — Z48.298 AFTERCARE FOLLOWING ORGAN TRANSPLANT: Primary | ICD-10-CM

## 2025-06-06 DIAGNOSIS — I15.1 HYPERTENSION SECONDARY TO OTHER RENAL DISORDERS: ICD-10-CM

## 2025-06-06 PROCEDURE — 3079F DIAST BP 80-89 MM HG: CPT | Performed by: INTERNAL MEDICINE

## 2025-06-06 PROCEDURE — 99215 OFFICE O/P EST HI 40 MIN: CPT | Mod: AF

## 2025-06-06 PROCEDURE — 99215 OFFICE O/P EST HI 40 MIN: CPT

## 2025-06-06 PROCEDURE — 3075F SYST BP GE 130 - 139MM HG: CPT | Performed by: INTERNAL MEDICINE

## 2025-06-06 ASSESSMENT — PAIN SCALES - GENERAL: PAINLEVEL_OUTOF10: 2

## 2025-06-06 NOTE — PROGRESS NOTES
TRANSPLANT NEPHROLOGY :   OUTPATIENT CLINIC NOTE      SERVICE DATE : 06/06/2025     REASON FOR VISIT/CHIEF COMPLAINT:  S/P  TRANSPLANT SURGERY  IMMUNOSUPPRESSIVE MEDICATION MANAGEMENT  BLOOD PRESSURE MANAGEMENT    HPI:    Mr. Ellison is a 20 y.o. male with past medical history significant for congenital hydronephrosis and ESRD secondary to chronic Remicade use secondary to Crohn disease s/p living related kidney transplant done on 4/25/2024. PRA 0%, hepatitis C viral status D-/R- CMV D-/R-, hepatitis B virus D-/R-. Patient had a spontaneous graft function with no significant posttransplant complications.       Last seen 6 weeks ago. Here for follow up.      Hand tremors improved after switching to Envarsus. Tolerating current regimen well.     Doing well overall.     Recent labs with Cr slightly up, 1.4. Fk above goal as well.     Pt denies any specific complaints today.    Denied chest pain, SOB, CH, Palpitation. Normal urination and bowel movement. Normal gait and no weakness of arms/legs. No cough, runny nose, sore throat, cold symptoms, or rash. No hearing loss. Normal vision.No problems with his sleep, mood and function. No recent infection, hospitalization, surgery or ER visits.      ROS:  Review of  14 systems was performed system by system. See HPI. Otherwise, the symptoms were negative.    PAST MEDICAL HISTORY:  Medical History[1]     PAST SURGICAL HISTORY:  Surgical History[2]     SOCIAL HISTORY:  Social History     Socioeconomic History    Marital status: Single     Spouse name: Not on file    Number of children: Not on file    Years of education: Not on file    Highest education level: Not on file   Occupational History    Not on file   Tobacco Use    Smoking status: Never    Smokeless tobacco: Never   Vaping Use    Vaping status: Never Used   Substance and Sexual Activity    Alcohol use: Never    Drug use: Never    Sexual activity: Not Currently   Other Topics Concern    Not on file   Social  History Narrative    Not on file     Social Drivers of Health     Financial Resource Strain: Not on file   Food Insecurity: No Food Insecurity (11/8/2024)    Hunger Vital Sign     Worried About Running Out of Food in the Last Year: Never true     Ran Out of Food in the Last Year: Never true   Transportation Needs: Not on file   Physical Activity: Not on file   Stress: Not on file   Social Connections: Not on file   Intimate Partner Violence: Not on file   Housing Stability: Not on file       FAMILY HISTORY:  Family History[3]    MEDICATION LIST:  Current Outpatient Medications   Medication Instructions    amitriptyline (ELAVIL) 10 mg, oral, Nightly    fluticasone (Flonase) 50 mcg/actuation nasal spray 1 spray, Each Nostril, Daily, Shake gently. Before first use, prime pump. After use, clean tip and replace cap.    mycophenolate (MYFORTIC) 540 mg, oral, 2 times daily    predniSONE (DELTASONE) 5 mg, oral, Daily    tacrolimus ER (ENVARSUS XR) 4 mg, oral, Daily, In addition to 2 mg for a total dose of 6 mg daily    tacrolimus ER (ENVARSUS XR) 2 mg, oral, Daily, In addition to 4 mg tablet for a total dose of 6 mg daily    tacrolimus ER (ENVARSUS XR) 4 mg, oral, Daily       ALLERGY  Allergies[4]    PHYSICAL EXAM:    Visit Vitals  /82 (BP Location: Right arm, Patient Position: Sitting, BP Cuff Size: Small adult)   Pulse 99   Temp 36.7 °C (98 °F) (Temporal)   Wt 50 kg (110 lb 3.2 oz)   SpO2 97%   BMI 17.79 kg/m²   Smoking Status Never   BSA 1.53 m²          General Appearance - NAD, A&Ox3   HEENT - Supple. Not pale. No jaundice. No JVD or LAD  CVS - RRR. Normal S1/S2. No murmur, rub or gallop  Lungs- clear to auscultation bilaterally  Abdomen - soft , NT, ND, no guarding. No hepatosplenomegaly. No allograft tenderness  Musculoskeletal /Extremities - no edema. Full ROM. No joint tenderness.   Neuro/Psych - appropriate mood and affect. Motor power V/V all extremities. CN I -XII were grossly intact.  Skin - No visible  rash      LABS:    Lab Results   Component Value Date    CREATININE 1.40 (H) 06/03/2025    BUN 25 (H) 06/03/2025     06/03/2025    K 4.4 06/03/2025     06/03/2025    CO2 25 06/03/2025     Lab Results   Component Value Date    PTH 51.5 03/07/2025    CALCIUM 9.6 06/03/2025    PHOS 3.8 06/03/2025     Lab Results   Component Value Date    WBC 4.1 (L) 06/03/2025    HGB 15.0 06/03/2025    HCT 46.1 06/03/2025    MCV 88 06/03/2025     06/03/2025     Lab Results   Component Value Date    IRON 159 (H) 08/26/2024    TIBC 282 08/26/2024    FERRITIN 472 (H) 08/26/2024     Lab Results   Component Value Date    TACROLIMUS 11.2 06/03/2025    EBVDNAPCR Not Detected 06/03/2025     Lab Results   Component Value Date    CMVDNAPCR Not Detected 03/07/2025    BKVDNAPCR Not Detected 03/21/2025    EBVDNAPCR Not Detected 06/03/2025         ASSESSMENT AND PLAN:    Mr. Ellison is a 20 y.o. male  who is here for follow up s/p kidney transplant.    TRANSPLANT DATE: 4/25/2024 (Kidney)      1. ESRD S/P kidney transplant   - Creatinine last check was 1.4, slightly up from baseline 1-1.2  - pt to hydrate and rpt labs early next week. Further plan based on trend.   -Random urine protein/creatinine ratio is 80 mg/g  -Allosure last check was 0.11% 5/13/25. Rpt with labs next week.   -Ensure adequate hydration  - Avoid nephrotoxic medications, NSAIDs, and IV contrast.    2. Immunosuppression  -Tacrolimus level last check was 11.2, slightly above goal (5-8). Rpt next week, adjust do se as indicated. Currently on Envarsus 6mg/d  -Continue  mg q12, Pred 5 mg/d   - EBV, CMV, BK PCR neg    3. Electrolytes  -Acceptable from last lab drawn    4. Hypertension  -recent BPs acceptable. No hypervolemia on exam  -cont to monitor home BP, call if trends concerning  -Continue current anti hypertensive medication    5. Bone Mineral Disease/Osteoporosis  - Ca, phos acceptable. PTH 57, vit D 107 (elevated). Off po vit D supplements.  - Consider  "DEXA every 2-3 years , defer to PCP    6.Anemia/Leukopenia:  - Hb ~15, acceptabel  - WBC borderline low, 4.1k.    7.Health maintenance and vaccination  - Flu shot during flu season annually  - Cancer screening is up to date per the patient    Lab : Routine transplant lab ( CBC, RFP, and anti-rejection trough level ) every 2 weeks  Additional labs:  VIT D, PTH Q3 months  Viral screening PCR, Allosure and UPC per protocol.    Additional Plan :  - further plan based on rpt labs next week.    RTC 6 weeks       [1]   Past Medical History:  Diagnosis Date    Acute Crohn's disease (Multi)     colitis    Aluminum bone disease     Anemia     Angina pectoris     Cachexia (Multi)     End stage chronic kidney disease (Multi)     Dialysis    Hemodialysis patient     Hydronephrosis     1 yo    Nephritis     Tachycardia     Vitamin D deficiency    [2]   Past Surgical History:  Procedure Laterality Date    COLON SURGERY  2014    crohns    COLONOSCOPY      CT GUIDED CHEST TUBE PLACEMENT  06/28/2023    CT GUIDED CHEST TUBE PLACEMENT 6/28/2023 [facility]    CT GUIDED PERCUTANEOUS BIOPSY KIDNEY  2023    CT GUIDED PERCUTANEOUS BIOPSY LUNG  06/28/2023    CT GUIDED PERCUTANEOUS BIOPSY LUNG 6/28/2023 [facility] CT    IR TUNNELED DIALYSIS CATHETER  2023    LUNG BIOPSY  2023    UPPER GASTROINTESTINAL ENDOSCOPY     [3]   Family History  Problem Relation Name Age of Onset    Graves' disease Mother      Hypertension Father      Kidney disease Brother      Heart disease Maternal Grandmother      Hypertension Maternal Grandfather      COPD Paternal Grandmother      Heart disease Paternal Grandmother      Accidental death Paternal Grandfather      Other (Other) Father's Sister          AMYOTROPHIC LATERAL SCLEROSIS    Hyperlipidemia Other MAT RELATIVES     Hyperlipidemia Other PAT RELATIVES    [4]   Allergies  Allergen Reactions    Cephalexin Rash    Methotrexate Headache, Other and Unknown     \"Did not tolerate well\", per mom. \" He would get sick, it caused " "nausea and headaches.\"    Adhesive Tape-Silicones Rash     Redness, raw skin     "

## 2025-06-09 ENCOUNTER — APPOINTMENT (OUTPATIENT)
Dept: LAB | Facility: HOSPITAL | Age: 20
End: 2025-06-09
Payer: COMMERCIAL

## 2025-06-09 LAB
ALBUMIN SERPL BCP-MCNC: 4.9 G/DL (ref 3.4–5)
ANION GAP SERPL CALC-SCNC: 15 MMOL/L (ref 10–20)
BUN SERPL-MCNC: 23 MG/DL (ref 6–23)
CALCIUM SERPL-MCNC: 9.9 MG/DL (ref 8.6–10.3)
CHLORIDE SERPL-SCNC: 102 MMOL/L (ref 98–107)
CO2 SERPL-SCNC: 28 MMOL/L (ref 21–32)
CREAT SERPL-MCNC: 1.41 MG/DL (ref 0.5–1.3)
CREAT UR-MCNC: 105.3 MG/DL (ref 20–370)
EGFRCR SERPLBLD CKD-EPI 2021: 73 ML/MIN/1.73M*2
ERYTHROCYTE [DISTWIDTH] IN BLOOD BY AUTOMATED COUNT: 12.8 % (ref 11.5–14.5)
GLUCOSE SERPL-MCNC: 84 MG/DL (ref 74–99)
HCT VFR BLD AUTO: 47.4 % (ref 41–52)
HGB BLD-MCNC: 15.4 G/DL (ref 13.5–17.5)
MAGNESIUM SERPL-MCNC: 1.95 MG/DL (ref 1.6–2.4)
MCH RBC QN AUTO: 28.7 PG (ref 26–34)
MCHC RBC AUTO-ENTMCNC: 32.5 G/DL (ref 32–36)
MCV RBC AUTO: 88 FL (ref 80–100)
NRBC BLD-RTO: 0 /100 WBCS (ref 0–0)
PHOSPHATE SERPL-MCNC: 4.2 MG/DL (ref 2.5–4.9)
PLATELET # BLD AUTO: 282 X10*3/UL (ref 150–450)
POTASSIUM SERPL-SCNC: 5.1 MMOL/L (ref 3.5–5.3)
PROT UR-ACNC: 12 MG/DL (ref 5–25)
PROT/CREAT UR: 0.11 MG/MG CREAT (ref 0–0.17)
RBC # BLD AUTO: 5.37 X10*6/UL (ref 4.5–5.9)
SODIUM SERPL-SCNC: 140 MMOL/L (ref 136–145)
WBC # BLD AUTO: 5.5 X10*3/UL (ref 4.4–11.3)

## 2025-06-09 PROCEDURE — 85027 COMPLETE CBC AUTOMATED: CPT

## 2025-06-09 PROCEDURE — 84156 ASSAY OF PROTEIN URINE: CPT

## 2025-06-09 PROCEDURE — 82570 ASSAY OF URINE CREATININE: CPT

## 2025-06-09 PROCEDURE — 80069 RENAL FUNCTION PANEL: CPT

## 2025-06-09 PROCEDURE — 80197 ASSAY OF TACROLIMUS: CPT

## 2025-06-09 PROCEDURE — 87799 DETECT AGENT NOS DNA QUANT: CPT

## 2025-06-09 PROCEDURE — 83735 ASSAY OF MAGNESIUM: CPT

## 2025-06-10 ENCOUNTER — PATIENT MESSAGE (OUTPATIENT)
Facility: HOSPITAL | Age: 20
End: 2025-06-10
Payer: COMMERCIAL

## 2025-06-10 DIAGNOSIS — Z94.0 KIDNEY REPLACED BY TRANSPLANT (HHS-HCC): ICD-10-CM

## 2025-06-10 LAB
EBV DNA SPEC NAA+PROBE-LOG#: NORMAL {LOG_COPIES}/ML
LABORATORY COMMENT REPORT: NOT DETECTED
TACROLIMUS BLD-MCNC: 10.2 NG/ML

## 2025-06-11 NOTE — PATIENT COMMUNICATION
Reviewed labs with Dr. Prado  Cr 1.4, 1.4   Tac 10.2, 11.2, 4.7   (having headaches)  PLAN:   decrease to 5 mg envarsus, repeat labs     Direct message sent to patient mom

## 2025-06-13 DIAGNOSIS — Z94.0 KIDNEY REPLACED BY TRANSPLANT (HHS-HCC): ICD-10-CM

## 2025-06-20 DIAGNOSIS — Z94.0 KIDNEY REPLACED BY TRANSPLANT (HHS-HCC): ICD-10-CM

## 2025-06-27 ENCOUNTER — LAB (OUTPATIENT)
Dept: LAB | Facility: HOSPITAL | Age: 20
End: 2025-06-27
Payer: COMMERCIAL

## 2025-06-27 DIAGNOSIS — Z94.0 KIDNEY REPLACED BY TRANSPLANT (HHS-HCC): ICD-10-CM

## 2025-06-27 DIAGNOSIS — Z94.0 KIDNEY TRANSPLANT STATUS: Primary | ICD-10-CM

## 2025-06-27 LAB
ALBUMIN SERPL BCP-MCNC: 4.8 G/DL (ref 3.4–5)
ANION GAP SERPL CALC-SCNC: 13 MMOL/L (ref 10–20)
BUN SERPL-MCNC: 21 MG/DL (ref 6–23)
CALCIUM SERPL-MCNC: 10 MG/DL (ref 8.6–10.6)
CHLORIDE SERPL-SCNC: 107 MMOL/L (ref 98–107)
CO2 SERPL-SCNC: 25 MMOL/L (ref 21–32)
CREAT SERPL-MCNC: 1.31 MG/DL (ref 0.5–1.3)
EGFRCR SERPLBLD CKD-EPI 2021: 79 ML/MIN/1.73M*2
ERYTHROCYTE [DISTWIDTH] IN BLOOD BY AUTOMATED COUNT: 13.1 % (ref 11.5–14.5)
GLUCOSE SERPL-MCNC: 86 MG/DL (ref 74–99)
HCT VFR BLD AUTO: 45.4 % (ref 41–52)
HGB BLD-MCNC: 14.2 G/DL (ref 13.5–17.5)
HOLD SPECIMEN: NORMAL
HOLD SPECIMEN: NORMAL
MAGNESIUM SERPL-MCNC: 2.04 MG/DL (ref 1.6–2.4)
MCH RBC QN AUTO: 27.2 PG (ref 26–34)
MCHC RBC AUTO-ENTMCNC: 31.3 G/DL (ref 32–36)
MCV RBC AUTO: 87 FL (ref 80–100)
NRBC BLD-RTO: 0 /100 WBCS (ref 0–0)
PHOSPHATE SERPL-MCNC: 3.8 MG/DL (ref 2.5–4.9)
PLATELET # BLD AUTO: 262 X10*3/UL (ref 150–450)
POTASSIUM SERPL-SCNC: 4.3 MMOL/L (ref 3.5–5.3)
RBC # BLD AUTO: 5.22 X10*6/UL (ref 4.5–5.9)
SODIUM SERPL-SCNC: 141 MMOL/L (ref 136–145)
TACROLIMUS BLD-MCNC: 8.6 NG/ML
WBC # BLD AUTO: 4.8 X10*3/UL (ref 4.4–11.3)

## 2025-06-27 PROCEDURE — 80197 ASSAY OF TACROLIMUS: CPT

## 2025-06-27 PROCEDURE — 36415 COLL VENOUS BLD VENIPUNCTURE: CPT

## 2025-06-27 PROCEDURE — 85027 COMPLETE CBC AUTOMATED: CPT

## 2025-06-27 PROCEDURE — 80069 RENAL FUNCTION PANEL: CPT

## 2025-06-27 PROCEDURE — 83735 ASSAY OF MAGNESIUM: CPT

## 2025-06-30 LAB
HOLD SPECIMEN: NORMAL
HOLD SPECIMEN: NORMAL

## 2025-07-04 DIAGNOSIS — Z94.0 KIDNEY REPLACED BY TRANSPLANT (HHS-HCC): ICD-10-CM

## 2025-07-10 ENCOUNTER — LAB (OUTPATIENT)
Dept: LAB | Facility: HOSPITAL | Age: 20
End: 2025-07-10
Payer: COMMERCIAL

## 2025-07-10 DIAGNOSIS — Z94.0 KIDNEY TRANSPLANT STATUS: Primary | ICD-10-CM

## 2025-07-10 LAB
ALBUMIN SERPL BCP-MCNC: 4.7 G/DL (ref 3.4–5)
ANION GAP SERPL CALC-SCNC: 11 MMOL/L (ref 10–20)
BUN SERPL-MCNC: 18 MG/DL (ref 6–23)
CALCIUM SERPL-MCNC: 9.9 MG/DL (ref 8.6–10.6)
CHLORIDE SERPL-SCNC: 103 MMOL/L (ref 98–107)
CO2 SERPL-SCNC: 29 MMOL/L (ref 21–32)
CREAT SERPL-MCNC: 1.29 MG/DL (ref 0.5–1.3)
EGFRCR SERPLBLD CKD-EPI 2021: 81 ML/MIN/1.73M*2
ERYTHROCYTE [DISTWIDTH] IN BLOOD BY AUTOMATED COUNT: 13.1 % (ref 11.5–14.5)
ERYTHROCYTE [DISTWIDTH] IN BLOOD BY AUTOMATED COUNT: 13.2 % (ref 11.5–14.5)
GLUCOSE SERPL-MCNC: 82 MG/DL (ref 74–99)
HCT VFR BLD AUTO: 44.4 % (ref 41–52)
HCT VFR BLD AUTO: 45.5 % (ref 41–52)
HGB BLD-MCNC: 13.7 G/DL (ref 13.5–17.5)
HGB BLD-MCNC: 13.9 G/DL (ref 13.5–17.5)
MAGNESIUM SERPL-MCNC: 2.07 MG/DL (ref 1.6–2.4)
MCH RBC QN AUTO: 27.2 PG (ref 26–34)
MCH RBC QN AUTO: 27.6 PG (ref 26–34)
MCHC RBC AUTO-ENTMCNC: 30.5 G/DL (ref 32–36)
MCHC RBC AUTO-ENTMCNC: 30.9 G/DL (ref 32–36)
MCV RBC AUTO: 88 FL (ref 80–100)
MCV RBC AUTO: 91 FL (ref 80–100)
NRBC BLD-RTO: 0 /100 WBCS (ref 0–0)
NRBC BLD-RTO: 0 /100 WBCS (ref 0–0)
PHOSPHATE SERPL-MCNC: 3.9 MG/DL (ref 2.5–4.9)
PLATELET # BLD AUTO: 281 X10*3/UL (ref 150–450)
PLATELET # BLD AUTO: 282 X10*3/UL (ref 150–450)
POTASSIUM SERPL-SCNC: 4.3 MMOL/L (ref 3.5–5.3)
RBC # BLD AUTO: 5.03 X10*6/UL (ref 4.5–5.9)
RBC # BLD AUTO: 5.03 X10*6/UL (ref 4.5–5.9)
SODIUM SERPL-SCNC: 139 MMOL/L (ref 136–145)
TACROLIMUS BLD-MCNC: 8.8 NG/ML
WBC # BLD AUTO: 6.6 X10*3/UL (ref 4.4–11.3)
WBC # BLD AUTO: 6.9 X10*3/UL (ref 4.4–11.3)

## 2025-07-10 PROCEDURE — 85027 COMPLETE CBC AUTOMATED: CPT

## 2025-07-10 PROCEDURE — 83735 ASSAY OF MAGNESIUM: CPT

## 2025-07-10 PROCEDURE — 80069 RENAL FUNCTION PANEL: CPT

## 2025-07-10 PROCEDURE — 36415 COLL VENOUS BLD VENIPUNCTURE: CPT

## 2025-07-10 PROCEDURE — 80197 ASSAY OF TACROLIMUS: CPT

## 2025-07-10 PROCEDURE — 87799 DETECT AGENT NOS DNA QUANT: CPT

## 2025-07-11 DIAGNOSIS — Z94.0 KIDNEY REPLACED BY TRANSPLANT (HHS-HCC): ICD-10-CM

## 2025-07-11 LAB
EBV DNA SPEC NAA+PROBE-LOG#: NORMAL {LOG_COPIES}/ML
HOLD SPECIMEN: NORMAL
LABORATORY COMMENT REPORT: NOT DETECTED

## 2025-07-18 ENCOUNTER — OFFICE VISIT (OUTPATIENT)
Facility: HOSPITAL | Age: 20
End: 2025-07-18
Payer: COMMERCIAL

## 2025-07-18 VITALS
SYSTOLIC BLOOD PRESSURE: 120 MMHG | HEART RATE: 92 BPM | BODY MASS INDEX: 16.95 KG/M2 | OXYGEN SATURATION: 98 % | TEMPERATURE: 96.3 F | DIASTOLIC BLOOD PRESSURE: 84 MMHG | WEIGHT: 105 LBS

## 2025-07-18 DIAGNOSIS — Z94.0 KIDNEY REPLACED BY TRANSPLANT (HHS-HCC): ICD-10-CM

## 2025-07-18 DIAGNOSIS — Z79.899 IMMUNOSUPPRESSIVE MANAGEMENT ENCOUNTER FOLLOWING KIDNEY TRANSPLANT: ICD-10-CM

## 2025-07-18 DIAGNOSIS — I10 ESSENTIAL HYPERTENSION: ICD-10-CM

## 2025-07-18 DIAGNOSIS — Z94.0 IMMUNOSUPPRESSIVE MANAGEMENT ENCOUNTER FOLLOWING KIDNEY TRANSPLANT: ICD-10-CM

## 2025-07-18 DIAGNOSIS — R19.7 DIARRHEA, UNSPECIFIED TYPE: ICD-10-CM

## 2025-07-18 DIAGNOSIS — Z94.0 KIDNEY REPLACED BY TRANSPLANT (HHS-HCC): Primary | ICD-10-CM

## 2025-07-18 PROCEDURE — 99214 OFFICE O/P EST MOD 30 MIN: CPT

## 2025-07-18 PROCEDURE — 3074F SYST BP LT 130 MM HG: CPT

## 2025-07-18 PROCEDURE — 3079F DIAST BP 80-89 MM HG: CPT

## 2025-07-18 RX ORDER — MYCOPHENOLIC ACID 180 MG/1
720 TABLET, DELAYED RELEASE ORAL 2 TIMES DAILY
Qty: 240 TABLET | Refills: 11 | Status: SHIPPED | OUTPATIENT
Start: 2025-07-18 | End: 2026-07-18

## 2025-07-18 ASSESSMENT — PATIENT HEALTH QUESTIONNAIRE - PHQ9
2. FEELING DOWN, DEPRESSED OR HOPELESS: NOT AT ALL
SUM OF ALL RESPONSES TO PHQ9 QUESTIONS 1 & 2: 0
1. LITTLE INTEREST OR PLEASURE IN DOING THINGS: NOT AT ALL

## 2025-07-18 NOTE — PATIENT INSTRUCTIONS
PLAN:   Increase myfortic to 4 tablets morning and night   Decrease Envarsus to 3 mg once daily     Repeat labs in 1 week after dose change with RFP, CBC, Tac, Mag, CMV/BK/EBV    Then routine labs every 2 weeks    Return to see us in 6-8 weeks

## 2025-07-21 DIAGNOSIS — Z94.0 KIDNEY REPLACED BY TRANSPLANT (HHS-HCC): ICD-10-CM

## 2025-07-25 ENCOUNTER — LAB (OUTPATIENT)
Dept: LAB | Facility: HOSPITAL | Age: 20
End: 2025-07-25
Payer: COMMERCIAL

## 2025-07-25 DIAGNOSIS — Z94.0 KIDNEY TRANSPLANT STATUS: Primary | ICD-10-CM

## 2025-07-25 DIAGNOSIS — Z94.0 KIDNEY REPLACED BY TRANSPLANT (HHS-HCC): ICD-10-CM

## 2025-07-25 LAB
ALBUMIN SERPL BCP-MCNC: 4.9 G/DL (ref 3.4–5)
ANION GAP SERPL CALC-SCNC: 14 MMOL/L (ref 10–20)
BUN SERPL-MCNC: 23 MG/DL (ref 6–23)
CALCIUM SERPL-MCNC: 9.9 MG/DL (ref 8.6–10.6)
CHLORIDE SERPL-SCNC: 103 MMOL/L (ref 98–107)
CO2 SERPL-SCNC: 25 MMOL/L (ref 21–32)
CREAT SERPL-MCNC: 1.4 MG/DL (ref 0.5–1.3)
EGFRCR SERPLBLD CKD-EPI 2021: 73 ML/MIN/1.73M*2
ERYTHROCYTE [DISTWIDTH] IN BLOOD BY AUTOMATED COUNT: 13.3 % (ref 11.5–14.5)
GLUCOSE SERPL-MCNC: 121 MG/DL (ref 74–99)
HCT VFR BLD AUTO: 43 % (ref 41–52)
HGB BLD-MCNC: 13.3 G/DL (ref 13.5–17.5)
MAGNESIUM SERPL-MCNC: 1.89 MG/DL (ref 1.6–2.4)
MCH RBC QN AUTO: 27.3 PG (ref 26–34)
MCHC RBC AUTO-ENTMCNC: 30.9 G/DL (ref 32–36)
MCV RBC AUTO: 88 FL (ref 80–100)
NRBC BLD-RTO: 0 /100 WBCS (ref 0–0)
PHOSPHATE SERPL-MCNC: 3.8 MG/DL (ref 2.5–4.9)
PLATELET # BLD AUTO: 239 X10*3/UL (ref 150–450)
POTASSIUM SERPL-SCNC: 4.1 MMOL/L (ref 3.5–5.3)
RBC # BLD AUTO: 4.88 X10*6/UL (ref 4.5–5.9)
SODIUM SERPL-SCNC: 138 MMOL/L (ref 136–145)
TACROLIMUS BLD-MCNC: 13.7 NG/ML
WBC # BLD AUTO: 6.1 X10*3/UL (ref 4.4–11.3)

## 2025-07-25 PROCEDURE — 85027 COMPLETE CBC AUTOMATED: CPT

## 2025-07-25 PROCEDURE — 83735 ASSAY OF MAGNESIUM: CPT

## 2025-07-25 PROCEDURE — 87799 DETECT AGENT NOS DNA QUANT: CPT

## 2025-07-25 PROCEDURE — 80197 ASSAY OF TACROLIMUS: CPT

## 2025-07-25 PROCEDURE — 36415 COLL VENOUS BLD VENIPUNCTURE: CPT

## 2025-07-25 PROCEDURE — 80069 RENAL FUNCTION PANEL: CPT

## 2025-07-26 ENCOUNTER — APPOINTMENT (OUTPATIENT)
Dept: LAB | Facility: HOSPITAL | Age: 20
End: 2025-07-26
Payer: COMMERCIAL

## 2025-07-27 LAB
BKV DNA SERPL NAA+PROBE-LOG#: ABNORMAL {LOG_COPIES}/ML
CMV DNA SERPL NAA+PROBE-LOG IU: NORMAL {LOG_IU}/ML
EBV DNA SPEC NAA+PROBE-LOG#: NORMAL {LOG_COPIES}/ML
LABORATORY COMMENT REPORT: ABNORMAL
LABORATORY COMMENT REPORT: NOT DETECTED
LABORATORY COMMENT REPORT: NOT DETECTED

## 2025-07-28 ENCOUNTER — TELEPHONE (OUTPATIENT)
Facility: HOSPITAL | Age: 20
End: 2025-07-28
Payer: COMMERCIAL

## 2025-07-28 DIAGNOSIS — Z94.0 KIDNEY REPLACED BY TRANSPLANT (HHS-HCC): ICD-10-CM

## 2025-07-28 NOTE — PROGRESS NOTES
TRANSPLANT NEPHROLOGY :   OUTPATIENT CLINIC NOTE      SERVICE DATE : 07/18/2025    REASON FOR VISIT/CHIEF COMPLAINT:  S/P  TRANSPLANT SURGERY  IMMUNOSUPPRESSIVE MEDICATION MANAGEMENT  BLOOD PRESSURE MANAGEMENT    HPI:    Mr. Ellison is a 20 y.o. male with past medical history significant for congenital hydronephrosis and ESRD secondary to chronic Remicade use secondary to Crohn disease s/p living related kidney transplant done on 4/25/2024. PRA 0%, hepatitis C viral status D-/R- CMV D-/R-, hepatitis B virus D-/R-. Patient had a spontaneous graft function with no significant posttransplant complications     Patient is here for follow up s/p kidney transplant.    Patient is doing well overall. No new complaints. Denied chest pain, SOB, CH, Palpitation. Normal urination and bowel movement. Normal gait and no weakness of arms/legs. No cough, runny nose, sore throat, cold symptoms, or rash. No hearing loss. Normal vision.No problems with his sleep, mood and function. No recent infection, hospitalization, surgery or ER visits.      ROS:  Review of  14 systems was performed system by system. See HPI. Otherwise, the symptoms were negative.    PAST MEDICAL HISTORY:  Medical History[1]     PAST SURGICAL HISTORY:  Surgical History[2]     SOCIAL HISTORY:  Social History     Socioeconomic History    Marital status: Single     Spouse name: Not on file    Number of children: Not on file    Years of education: Not on file    Highest education level: Not on file   Occupational History    Not on file   Tobacco Use    Smoking status: Never    Smokeless tobacco: Never   Vaping Use    Vaping status: Never Used   Substance and Sexual Activity    Alcohol use: Yes    Drug use: Never    Sexual activity: Not Currently   Other Topics Concern    Not on file   Social History Narrative    Not on file     Social Drivers of Health     Financial Resource Strain: Not on file   Food Insecurity: No Food Insecurity (11/8/2024)    Hunger Vital  Sign     Worried About Running Out of Food in the Last Year: Never true     Ran Out of Food in the Last Year: Never true   Transportation Needs: Not on file   Physical Activity: Not on file   Stress: No Stress Concern Present (7/18/2025)    [employer] - Occupational Stress Questionnaire     Feeling of Stress: Not at all   Social Connections: Not on file   Intimate Partner Violence: Not on file   Housing Stability: Not on file       FAMILY HISTORY:  Family History[3]    MEDICATION LIST:  Current Outpatient Medications   Medication Instructions    amitriptyline (ELAVIL) 10 mg, oral, Nightly    fluticasone (Flonase) 50 mcg/actuation nasal spray 1 spray, Each Nostril, Daily, Shake gently. Before first use, prime pump. After use, clean tip and replace cap.    mycophenolate (MYFORTIC) 720 mg, oral, 2 times daily    predniSONE (DELTASONE) 5 mg, oral, Daily    tacrolimus ER (ENVARSUS XR) 3 mg, oral, Daily       ALLERGY  Allergies[4]    PHYSICAL EXAM:    Visit Vitals  /84 (BP Location: Left arm, Patient Position: Sitting, BP Cuff Size: Adult)   Pulse 92   Temp 35.7 °C (96.3 °F) (Temporal)   Wt 47.6 kg (105 lb)   SpO2 98%   BMI 16.95 kg/m²   Smoking Status Never   BSA 1.49 m²          Vital signs - reviewed. Acceptable BP at this office visit.   General Appearance - NAD, Good speech, oriented and alert  HEENT - Supple. Not pale. No jaundice. No cervical lymphadenopathy. Pharynx and tonsils are not injected.  CVS - RRR. Normal S1/S2. No murmur, click , rub or gallop  Lungs- clear to auscultation bilaterally  Abdomen - soft , not tender, no guarding, no rigidity. No hepatosplenomegaly. Normal bowel sounds. No masses and ascites. S/P Kidney transplant .  Transplanted kidney is not tender.   Musculoskeletal /Extremities - no edema. Full ROM. No joint tenderness.   Neuro/Psych - appropriate mood and affect. Motor power V/V all extremities. CN I -XII were grossly intact.  Skin - No visible  rash      LABS:    Lab Results   Component Value Date    WBC 6.1 07/25/2025    HGB 13.3 (L) 07/25/2025    HCT 43.0 07/25/2025     07/25/2025    ALT 17 04/16/2024    AST 17 04/16/2024     07/25/2025    K 4.1 07/25/2025     07/25/2025    CREATININE 1.40 (H) 07/25/2025    BUN 23 07/25/2025    CO2 25 07/25/2025    INR 1.1 05/13/2025    HGBA1C 4.2 04/16/2024     par    ASSESSMENT AND PLAN:    Mr. Ellison is a 20 y.o. male  who is here for follow up s/p kidney transplant.    TRANSPLANT DATE: 4/25/2024 (Kidney)      1. ESRD S/P kidney transplant   - Creatinine last check was :  Lab Results   Component Value Date    CREATININE 1.40 (H) 07/25/2025     -Ensure adequate hydration  - Avoid nephrotoxic medications, NSAIDs, and IV contrast.    2. Immunosuppression  -Continue current immunosuppression regimen.    3. Electrolytes  Lab Results   Component Value Date    GLUCOSE 121 (H) 07/25/2025    CALCIUM 9.9 07/25/2025     07/25/2025    K 4.1 07/25/2025    CO2 25 07/25/2025     07/25/2025    BUN 23 07/25/2025    CREATININE 1.40 (H) 07/25/2025     -Acceptable from last lab drawn    4. Hypertension  Blood Pressures         7/18/2025  1210             BP: 120/84          -Home  BP had been acceptable  -Encourage to monitor home BP  -Continue current anti hypertensive medication    5. Bone Mineral Disease/Osteoporosis  Lab Results   Component Value Date    PTH 51.5 03/07/2025    CALCIUM 9.9 07/25/2025    PHOS 3.8 07/25/2025    VITD25 107 (H) 03/07/2025     -check VIT D, PTH q 3 months  - Consider DEXA every 2-3 years , defer to PCP  - May consider initiation of Sensipar when PTH >300 AND Ca >8.4    6.Anemia  Lab Results   Component Value Date    WBC 6.1 07/25/2025    HGB 13.3 (L) 07/25/2025    HCT 43.0 07/25/2025    MCV 88 07/25/2025     07/25/2025     Lab Results   Component Value Date    IRON 159 (H) 08/26/2024    TIBC 282 08/26/2024    FERRITIN 472 (H) 08/26/2024     -asymptomatic  - Continue  "to monitor  -check iron studies and ferritin. Will consider TUTU as needed.  - No indications for blood transfusion     7.Health maintenance and vaccination  - Flu shot during flu season annually  - Cancer screening is up to date per the patient    SUMMARY    Increase myfortic to 4 tablets morning and night   Decrease Envarsus to 3 mg once daily     Repeat labs in 1 week after dose change with RFP, CBC, Tac, Mag, CMV/BK/EBV    Then routine labs every 2 weeks    Vish Duttno MD    Transplant Nephrology          [1]   Past Medical History:  Diagnosis Date    Acute Crohn's disease (Multi)     colitis    Aluminum bone disease     Anemia     Angina pectoris     Cachexia (Multi)     End stage chronic kidney disease (Multi)     Dialysis    Hemodialysis patient     Hydronephrosis     1 yo    Nephritis     Tachycardia     Vitamin D deficiency    [2]   Past Surgical History:  Procedure Laterality Date    COLON SURGERY  2014    crohns    COLONOSCOPY      CT GUIDED CHEST TUBE PLACEMENT  06/28/2023    CT GUIDED CHEST TUBE PLACEMENT 6/28/2023 [facility]    CT GUIDED PERCUTANEOUS BIOPSY KIDNEY  2023    CT GUIDED PERCUTANEOUS BIOPSY LUNG  06/28/2023    CT GUIDED PERCUTANEOUS BIOPSY LUNG 6/28/2023 [facility] CT    IR TUNNELED DIALYSIS CATHETER  2023    LUNG BIOPSY  2023    UPPER GASTROINTESTINAL ENDOSCOPY     [3]   Family History  Problem Relation Name Age of Onset    Graves' disease Mother      Hypertension Father      Kidney disease Brother      Heart disease Maternal Grandmother      Hypertension Maternal Grandfather      COPD Paternal Grandmother      Heart disease Paternal Grandmother      Accidental death Paternal Grandfather      Other (Other) Father's Sister          AMYOTROPHIC LATERAL SCLEROSIS    Hyperlipidemia Other MAT RELATIVES     Hyperlipidemia Other PAT RELATIVES    [4]   Allergies  Allergen Reactions    Cephalexin Rash    Methotrexate Headache, Other and Unknown     \"Did not tolerate well\", per mom. \" He would get " "sick, it caused nausea and headaches.\"    Adhesive Tape-Silicones Rash     Redness, raw skin     "

## 2025-07-28 NOTE — TELEPHONE ENCOUNTER
Lab review with Dr. Salas   Tac 13.7, prev 8.8, 8.6, 10.2  BK NQ, Pred ND  ON myfortic 720 mg BID, Envarsus 3 mg daily  -  Plan  Decrease to 2 mg of Envarsus daily  Messaged patient via [employer]

## 2025-08-01 DIAGNOSIS — Z94.0 KIDNEY REPLACED BY TRANSPLANT (HHS-HCC): ICD-10-CM

## 2025-08-08 ENCOUNTER — TELEPHONE (OUTPATIENT)
Facility: HOSPITAL | Age: 20
End: 2025-08-08
Payer: COMMERCIAL

## 2025-08-08 ENCOUNTER — LAB (OUTPATIENT)
Dept: LAB | Facility: HOSPITAL | Age: 20
End: 2025-08-08
Payer: COMMERCIAL

## 2025-08-08 DIAGNOSIS — Z94.0 KIDNEY REPLACED BY TRANSPLANT (HHS-HCC): ICD-10-CM

## 2025-08-08 DIAGNOSIS — Z94.0 KIDNEY TRANSPLANT STATUS: Primary | ICD-10-CM

## 2025-08-08 LAB
ALBUMIN SERPL BCP-MCNC: 4.8 G/DL (ref 3.4–5)
ANION GAP SERPL CALC-SCNC: 14 MMOL/L (ref 10–20)
BUN SERPL-MCNC: 20 MG/DL (ref 6–23)
CALCIUM SERPL-MCNC: 10.1 MG/DL (ref 8.6–10.6)
CHLORIDE SERPL-SCNC: 104 MMOL/L (ref 98–107)
CO2 SERPL-SCNC: 26 MMOL/L (ref 21–32)
CREAT SERPL-MCNC: 1.3 MG/DL (ref 0.5–1.3)
EGFRCR SERPLBLD CKD-EPI 2021: 80 ML/MIN/1.73M*2
ERYTHROCYTE [DISTWIDTH] IN BLOOD BY AUTOMATED COUNT: 13.5 % (ref 11.5–14.5)
GLUCOSE SERPL-MCNC: 87 MG/DL (ref 74–99)
HCT VFR BLD AUTO: 44 % (ref 41–52)
HGB BLD-MCNC: 13.3 G/DL (ref 13.5–17.5)
MAGNESIUM SERPL-MCNC: 1.73 MG/DL (ref 1.6–2.4)
MCH RBC QN AUTO: 27.8 PG (ref 26–34)
MCHC RBC AUTO-ENTMCNC: 30.2 G/DL (ref 32–36)
MCV RBC AUTO: 92 FL (ref 80–100)
NRBC BLD-RTO: 0 /100 WBCS (ref 0–0)
PHOSPHATE SERPL-MCNC: 3.5 MG/DL (ref 2.5–4.9)
PLATELET # BLD AUTO: 272 X10*3/UL (ref 150–450)
POTASSIUM SERPL-SCNC: 4.3 MMOL/L (ref 3.5–5.3)
RBC # BLD AUTO: 4.78 X10*6/UL (ref 4.5–5.9)
SODIUM SERPL-SCNC: 140 MMOL/L (ref 136–145)
TACROLIMUS BLD-MCNC: 7.7 NG/ML
WBC # BLD AUTO: 7.3 X10*3/UL (ref 4.4–11.3)

## 2025-08-08 PROCEDURE — 80069 RENAL FUNCTION PANEL: CPT

## 2025-08-08 PROCEDURE — 83735 ASSAY OF MAGNESIUM: CPT

## 2025-08-08 PROCEDURE — 87799 DETECT AGENT NOS DNA QUANT: CPT

## 2025-08-08 PROCEDURE — 85027 COMPLETE CBC AUTOMATED: CPT

## 2025-08-08 PROCEDURE — 80197 ASSAY OF TACROLIMUS: CPT

## 2025-08-08 NOTE — TELEPHONE ENCOUNTER
Call placed to patient, states HA for 2 days, some diarrhea. Did not receive message to decrease Envarsus dose to 2 mg daily when tac level was elevated at 13.7.   Reviewed patient symptoms with Dr. Dutton.  PLAN:   Hold tomorrow envarsus dose and resume Sunday at 2 mg daily.   Hydrate well, take extra strength Tylenol.  If HA worsens go to ED  If diarrhea worsens reach out to TI with update  Repeat tac next week .    Patient verbalized understanding of plan.

## 2025-08-09 LAB — HOLD SPECIMEN: NORMAL

## 2025-08-10 LAB
EBV DNA SPEC NAA+PROBE-LOG#: NORMAL {LOG_COPIES}/ML
LABORATORY COMMENT REPORT: NOT DETECTED

## 2025-08-11 LAB — HOLD SPECIMEN: NORMAL

## 2025-08-15 DIAGNOSIS — Z94.0 KIDNEY REPLACED BY TRANSPLANT (HHS-HCC): ICD-10-CM

## 2025-08-20 ENCOUNTER — PATIENT MESSAGE (OUTPATIENT)
Facility: HOSPITAL | Age: 20
End: 2025-08-20
Payer: COMMERCIAL

## 2025-08-20 ENCOUNTER — LAB (OUTPATIENT)
Dept: LAB | Facility: HOSPITAL | Age: 20
End: 2025-08-20
Payer: COMMERCIAL

## 2025-08-20 DIAGNOSIS — B34.8 BK VIREMIA: ICD-10-CM

## 2025-08-20 DIAGNOSIS — Z94.0 KIDNEY TRANSPLANT STATUS: Primary | ICD-10-CM

## 2025-08-20 DIAGNOSIS — Z94.0 KIDNEY REPLACED BY TRANSPLANT (HHS-HCC): ICD-10-CM

## 2025-08-20 LAB
ALBUMIN SERPL BCP-MCNC: 5.1 G/DL (ref 3.4–5)
ANION GAP SERPL CALC-SCNC: 12 MMOL/L (ref 10–20)
BUN SERPL-MCNC: 23 MG/DL (ref 6–23)
CALCIUM SERPL-MCNC: 9.9 MG/DL (ref 8.6–10.6)
CHLORIDE SERPL-SCNC: 102 MMOL/L (ref 98–107)
CO2 SERPL-SCNC: 28 MMOL/L (ref 21–32)
CREAT SERPL-MCNC: 1.21 MG/DL (ref 0.5–1.3)
CREAT UR-MCNC: 109 MG/DL (ref 20–370)
EGFRCR SERPLBLD CKD-EPI 2021: 87 ML/MIN/1.73M*2
ERYTHROCYTE [DISTWIDTH] IN BLOOD BY AUTOMATED COUNT: 13.8 % (ref 11.5–14.5)
GLUCOSE SERPL-MCNC: 85 MG/DL (ref 74–99)
HCT VFR BLD AUTO: 43.5 % (ref 41–52)
HGB BLD-MCNC: 13.3 G/DL (ref 13.5–17.5)
MAGNESIUM SERPL-MCNC: 2.02 MG/DL (ref 1.6–2.4)
MCH RBC QN AUTO: 27.5 PG (ref 26–34)
MCHC RBC AUTO-ENTMCNC: 30.6 G/DL (ref 32–36)
MCV RBC AUTO: 90 FL (ref 80–100)
NRBC BLD-RTO: 0 /100 WBCS (ref 0–0)
PHOSPHATE SERPL-MCNC: 3.8 MG/DL (ref 2.5–4.9)
PLATELET # BLD AUTO: 275 X10*3/UL (ref 150–450)
POTASSIUM SERPL-SCNC: 4.4 MMOL/L (ref 3.5–5.3)
PROT UR-ACNC: 12 MG/DL (ref 5–25)
PROT/CREAT UR: 0.11 MG/MG CREAT (ref 0–0.17)
RBC # BLD AUTO: 4.84 X10*6/UL (ref 4.5–5.9)
SODIUM SERPL-SCNC: 138 MMOL/L (ref 136–145)
WBC # BLD AUTO: 7.3 X10*3/UL (ref 4.4–11.3)

## 2025-08-20 PROCEDURE — 80197 ASSAY OF TACROLIMUS: CPT

## 2025-08-20 PROCEDURE — 82570 ASSAY OF URINE CREATININE: CPT

## 2025-08-20 PROCEDURE — 83735 ASSAY OF MAGNESIUM: CPT

## 2025-08-20 PROCEDURE — 87799 DETECT AGENT NOS DNA QUANT: CPT

## 2025-08-20 PROCEDURE — 80069 RENAL FUNCTION PANEL: CPT

## 2025-08-20 PROCEDURE — 85027 COMPLETE CBC AUTOMATED: CPT

## 2025-08-20 PROCEDURE — 84156 ASSAY OF PROTEIN URINE: CPT

## 2025-08-20 PROCEDURE — 36415 COLL VENOUS BLD VENIPUNCTURE: CPT

## 2025-08-21 LAB
BKV DNA SERPL NAA+PROBE-LOG#: ABNORMAL {LOG_COPIES}/ML
EBV DNA SPEC NAA+PROBE-LOG#: NORMAL {LOG_COPIES}/ML
LABORATORY COMMENT REPORT: ABNORMAL
LABORATORY COMMENT REPORT: NOT DETECTED
TACROLIMUS BLD-MCNC: 6.7 NG/ML

## 2025-08-22 DIAGNOSIS — Z94.0 KIDNEY REPLACED BY TRANSPLANT (HHS-HCC): ICD-10-CM

## 2025-08-29 ENCOUNTER — OFFICE VISIT (OUTPATIENT)
Facility: HOSPITAL | Age: 20
End: 2025-08-29
Payer: COMMERCIAL

## 2025-08-29 VITALS
BODY MASS INDEX: 17.03 KG/M2 | TEMPERATURE: 97.1 F | OXYGEN SATURATION: 98 % | SYSTOLIC BLOOD PRESSURE: 131 MMHG | DIASTOLIC BLOOD PRESSURE: 84 MMHG | WEIGHT: 105.5 LBS | HEART RATE: 97 BPM

## 2025-08-29 DIAGNOSIS — Z79.899 IMMUNOSUPPRESSIVE MANAGEMENT ENCOUNTER FOLLOWING KIDNEY TRANSPLANT: ICD-10-CM

## 2025-08-29 DIAGNOSIS — Z94.0 KIDNEY REPLACED BY TRANSPLANT (HHS-HCC): ICD-10-CM

## 2025-08-29 DIAGNOSIS — L70.0 ACNE VULGARIS: ICD-10-CM

## 2025-08-29 DIAGNOSIS — Z48.298 AFTERCARE FOLLOWING ORGAN TRANSPLANT: ICD-10-CM

## 2025-08-29 DIAGNOSIS — Z94.0 KIDNEY REPLACED BY TRANSPLANT (HHS-HCC): Primary | ICD-10-CM

## 2025-08-29 DIAGNOSIS — Z79.899 ENCOUNTER FOR LONG-TERM (CURRENT) USE OF HIGH-RISK MEDICATION: ICD-10-CM

## 2025-08-29 DIAGNOSIS — Z94.0 IMMUNOSUPPRESSIVE MANAGEMENT ENCOUNTER FOLLOWING KIDNEY TRANSPLANT: ICD-10-CM

## 2025-08-29 DIAGNOSIS — K50.10 CROHN'S DISEASE OF COLON WITHOUT COMPLICATION (MULTI): ICD-10-CM

## 2025-08-29 DIAGNOSIS — Z51.81 THERAPEUTIC DRUG MONITORING: ICD-10-CM

## 2025-08-29 DIAGNOSIS — Z92.25 PERSONAL HISTORY OF IMMUNOSUPRESSION THERAPY: ICD-10-CM

## 2025-08-29 DIAGNOSIS — B34.8 BK VIREMIA: ICD-10-CM

## 2025-08-29 PROCEDURE — 99215 OFFICE O/P EST HI 40 MIN: CPT

## 2025-08-29 RX ORDER — ADAPALENE AND BENZOYL PEROXIDE GEL, 0.1%/2.5% 1; 25 MG/G; MG/G
1 GEL TOPICAL NIGHTLY
Qty: 45 G | Refills: 3 | Status: SHIPPED | OUTPATIENT
Start: 2025-08-29 | End: 2026-08-29

## 2025-08-29 ASSESSMENT — PAIN SCALES - GENERAL: PAINLEVEL_OUTOF10: 0-NO PAIN

## 2025-09-05 PROCEDURE — 80069 RENAL FUNCTION PANEL: CPT

## 2025-09-05 PROCEDURE — 85027 COMPLETE CBC AUTOMATED: CPT

## 2025-09-05 PROCEDURE — 80197 ASSAY OF TACROLIMUS: CPT

## 2025-09-05 PROCEDURE — 83735 ASSAY OF MAGNESIUM: CPT

## 2025-09-06 ENCOUNTER — LAB (OUTPATIENT)
Dept: LAB | Facility: HOSPITAL | Age: 20
End: 2025-09-06
Payer: COMMERCIAL

## 2025-09-06 DIAGNOSIS — Z94.0 KIDNEY TRANSPLANT STATUS: Primary | ICD-10-CM

## 2025-09-06 LAB
ALBUMIN SERPL BCP-MCNC: 4.8 G/DL (ref 3.4–5)
ANION GAP SERPL CALC-SCNC: 13 MMOL/L (ref 10–20)
BUN SERPL-MCNC: 21 MG/DL (ref 6–23)
CALCIUM SERPL-MCNC: 9.8 MG/DL (ref 8.6–10.6)
CHLORIDE SERPL-SCNC: 103 MMOL/L (ref 98–107)
CO2 SERPL-SCNC: 28 MMOL/L (ref 21–32)
CREAT SERPL-MCNC: 1.26 MG/DL (ref 0.5–1.3)
EGFRCR SERPLBLD CKD-EPI 2021: 83 ML/MIN/1.73M*2
ERYTHROCYTE [DISTWIDTH] IN BLOOD BY AUTOMATED COUNT: 13.5 % (ref 11.5–14.5)
GLUCOSE SERPL-MCNC: 120 MG/DL (ref 74–99)
HCT VFR BLD AUTO: 43.7 % (ref 41–52)
HGB BLD-MCNC: 13 G/DL (ref 13.5–17.5)
HOLD SPECIMEN: NORMAL
MAGNESIUM SERPL-MCNC: 2.16 MG/DL (ref 1.6–2.4)
MCH RBC QN AUTO: 27.6 PG (ref 26–34)
MCHC RBC AUTO-ENTMCNC: 29.7 G/DL (ref 32–36)
MCV RBC AUTO: 93 FL (ref 80–100)
NRBC BLD-RTO: 0 /100 WBCS (ref 0–0)
PHOSPHATE SERPL-MCNC: 3.8 MG/DL (ref 2.5–4.9)
PLATELET # BLD AUTO: 267 X10*3/UL (ref 150–450)
POTASSIUM SERPL-SCNC: 3.9 MMOL/L (ref 3.5–5.3)
RBC # BLD AUTO: 4.71 X10*6/UL (ref 4.5–5.9)
SODIUM SERPL-SCNC: 140 MMOL/L (ref 136–145)
TACROLIMUS BLD-MCNC: 6.8 NG/ML
WBC # BLD AUTO: 7.4 X10*3/UL (ref 4.4–11.3)

## 2025-09-07 LAB
BKV DNA SERPL NAA+PROBE-ACNC: 37 IU/ML (ref ?–22)
BKV DNA SERPL NAA+PROBE-LOG#: 1.57 LOG IU/ML
CMV DNA SERPL NAA+PROBE-LOG IU: NORMAL {LOG_IU}/ML
EBV DNA SPEC NAA+PROBE-LOG#: NORMAL {LOG_COPIES}/ML
LABORATORY COMMENT REPORT: DETECTED
LABORATORY COMMENT REPORT: NOT DETECTED
LABORATORY COMMENT REPORT: NOT DETECTED

## (undated) DEVICE — BAG, DECANTER

## (undated) DEVICE — SUTURE, VICRYL, 3-0,18 IN, SH, UNDYED

## (undated) DEVICE — Device

## (undated) DEVICE — SUTURE, PROLENE, 1 X 60IN TP-1, BLUE

## (undated) DEVICE — COVER, CART, 45 X 27 X 48 IN, CLEAR

## (undated) DEVICE — COUNTER, NEEDLE, FOAM BLOCK, W/MAGNET, W/BLADE GUARD, 10 COUNT, RED, LF

## (undated) DEVICE — SUTURE, PROLENE, 5-0, 36 IN, C1, DA, BLUE

## (undated) DEVICE — NEEDLE, HYPODERMIC, REGULAR WALL, REGULAR BEVEL, 30 G X 0.5 IN

## (undated) DEVICE — BAG, DRAINAGE, URINARY, URINE METER, INFECTION CONTROL, LF, 350ML

## (undated) DEVICE — CATHETER, FOLEY, 3-WAY, 5ML, 16FR, SILICONE, LF

## (undated) DEVICE — COVER, TABLE, 44 X 75 IN, DISPOSABLE, LF, STERILE

## (undated) DEVICE — SUTURE, PDS II, 6-0 C1 30IN VIL MONO, VIOLET

## (undated) DEVICE — STAPLER, SKIN, PLUS, REGULAR, 35

## (undated) DEVICE — PLUG, CATHETER

## (undated) DEVICE — SPONGE, DISSECTOR, PEANUT, 3/8, STERILE 5 FOAM HOLDER"

## (undated) DEVICE — SUTURE, PDS II, 4-0, 27 IN, RB-1 VIL MONO, LF

## (undated) DEVICE — SUTURE, PROLENE, 6-0, 30 IN, C1, DA, BLUE

## (undated) DEVICE — MANIFOLD, 4 PORT NEPTUNE STANDARD

## (undated) DEVICE — DRAPE, INSTRUMENT, W/POUCH, STERI DRAPE, 7 X 11 IN, DISPOSABLE, STERILE

## (undated) DEVICE — INSERT, CLAMP, SURGICAL, SOFT/TRACTION, STEALTH, 1 MM

## (undated) DEVICE — SUTURE, SILK, 3-0, 18 IN SH/CR, BLACK

## (undated) DEVICE — NEEDLE, HYPODERMIC, REGULAR WALL, REGULAR BEVEL, 22 G X 1 IN

## (undated) DEVICE — TUBING, SUCTION, CONNECTING, STERILE 0.25 X 120 IN., LF

## (undated) DEVICE — CATHETER TRAY, URETHRAL, W/O CATHETER, W/O BAG, LF

## (undated) DEVICE — APPLIER, LIGACLIP, MULTIPLE, SMALL 9-3/8IN

## (undated) DEVICE — SYRINGE, 20 CC, LUER LOCK, MONOJECT, W/O CAP, LF

## (undated) DEVICE — COVER, EQUIPMENT, SOLUTION, SLUSH, 112 X 168 CM, LF, STERILE

## (undated) DEVICE — DRAPE, INCISE, ANTIMICROBIAL, IOBAN 2, LARGE, 17 X 23 IN, DISPOSABLE, STERILE

## (undated) DEVICE — PUNCH, AORTIC 4MM

## (undated) DEVICE — DRAIN, PENROSE, 0.5 X 12 IN, LATEX, STERILE

## (undated) DEVICE — HANDPIECE, ABC, SINGLE FUNCTION, MALLEABLE, W/CORD, BEND-A-BEAM, 3 IN, LF

## (undated) DEVICE — GUIDEWIRE, .035 X 150 PTFE, FIXED CORE, 15CM BENTSON

## (undated) DEVICE — SUTURE, SILK, 0, 24 IN, SUTUPAK, BLACK

## (undated) DEVICE — STATLOCK, FOLEY SECURE, 3-WAY

## (undated) DEVICE — PAD, GROUNDING, ELECTROSURGICAL, DUAL

## (undated) DEVICE — TUBE, FEEDING, INFANT, 8 FR, 20IN

## (undated) DEVICE — TOWEL, OR, XRAY DETECT 5 PK, WHITE, 17X26, W/DMT TAG, ST

## (undated) DEVICE — DRAPE, SHEET, THREE QUARTER, FAN FOLD, 57 X 77 IN

## (undated) DEVICE — DRESSING, ADHESIVE, ISLAND, TELFA, 4 X 10 IN